# Patient Record
Sex: FEMALE | Race: WHITE | Employment: OTHER | URBAN - METROPOLITAN AREA
[De-identification: names, ages, dates, MRNs, and addresses within clinical notes are randomized per-mention and may not be internally consistent; named-entity substitution may affect disease eponyms.]

---

## 2017-01-16 RX ORDER — FUROSEMIDE 20 MG/1
TABLET ORAL
Qty: 90 TABLET | Refills: 0 | Status: SHIPPED | OUTPATIENT
Start: 2017-01-16 | End: 2017-04-24 | Stop reason: SDUPTHER

## 2017-02-16 ENCOUNTER — OFFICE VISIT (OUTPATIENT)
Dept: DERMATOLOGY | Age: 67
End: 2017-02-16

## 2017-02-16 DIAGNOSIS — R21 RASH: Primary | ICD-10-CM

## 2017-02-16 PROCEDURE — 11100 PR BIOPSY OF SKIN LESION: CPT | Performed by: DERMATOLOGY

## 2017-02-16 PROCEDURE — G8399 PT W/DXA RESULTS DOCUMENT: HCPCS | Performed by: DERMATOLOGY

## 2017-02-16 PROCEDURE — 11101 PR BIOPSY, EACH ADDED LESION: CPT | Performed by: DERMATOLOGY

## 2017-02-16 PROCEDURE — 4040F PNEUMOC VAC/ADMIN/RCVD: CPT | Performed by: DERMATOLOGY

## 2017-02-16 PROCEDURE — 1123F ACP DISCUSS/DSCN MKR DOCD: CPT | Performed by: DERMATOLOGY

## 2017-02-16 PROCEDURE — 3014F SCREEN MAMMO DOC REV: CPT | Performed by: DERMATOLOGY

## 2017-02-16 PROCEDURE — 1090F PRES/ABSN URINE INCON ASSESS: CPT | Performed by: DERMATOLOGY

## 2017-02-16 PROCEDURE — G8427 DOCREV CUR MEDS BY ELIG CLIN: HCPCS | Performed by: DERMATOLOGY

## 2017-02-16 PROCEDURE — 99213 OFFICE O/P EST LOW 20 MIN: CPT | Performed by: DERMATOLOGY

## 2017-02-16 PROCEDURE — G8484 FLU IMMUNIZE NO ADMIN: HCPCS | Performed by: DERMATOLOGY

## 2017-02-16 PROCEDURE — 1036F TOBACCO NON-USER: CPT | Performed by: DERMATOLOGY

## 2017-02-16 PROCEDURE — G8417 CALC BMI ABV UP PARAM F/U: HCPCS | Performed by: DERMATOLOGY

## 2017-02-16 PROCEDURE — 3017F COLORECTAL CA SCREEN DOC REV: CPT | Performed by: DERMATOLOGY

## 2017-02-21 ENCOUNTER — TELEPHONE (OUTPATIENT)
Dept: DERMATOLOGY | Age: 67
End: 2017-02-21

## 2017-03-09 ENCOUNTER — OFFICE VISIT (OUTPATIENT)
Dept: DERMATOLOGY | Age: 67
End: 2017-03-09

## 2017-03-09 DIAGNOSIS — L43.9 LICHEN PLANUS: Primary | ICD-10-CM

## 2017-03-09 PROCEDURE — G8484 FLU IMMUNIZE NO ADMIN: HCPCS | Performed by: DERMATOLOGY

## 2017-03-09 PROCEDURE — 96372 THER/PROPH/DIAG INJ SC/IM: CPT | Performed by: DERMATOLOGY

## 2017-03-09 PROCEDURE — G8399 PT W/DXA RESULTS DOCUMENT: HCPCS | Performed by: DERMATOLOGY

## 2017-03-09 PROCEDURE — G8417 CALC BMI ABV UP PARAM F/U: HCPCS | Performed by: DERMATOLOGY

## 2017-03-09 PROCEDURE — 1036F TOBACCO NON-USER: CPT | Performed by: DERMATOLOGY

## 2017-03-09 PROCEDURE — 3014F SCREEN MAMMO DOC REV: CPT | Performed by: DERMATOLOGY

## 2017-03-09 PROCEDURE — 99213 OFFICE O/P EST LOW 20 MIN: CPT | Performed by: DERMATOLOGY

## 2017-03-09 PROCEDURE — G8427 DOCREV CUR MEDS BY ELIG CLIN: HCPCS | Performed by: DERMATOLOGY

## 2017-03-09 PROCEDURE — 4040F PNEUMOC VAC/ADMIN/RCVD: CPT | Performed by: DERMATOLOGY

## 2017-03-09 PROCEDURE — 3017F COLORECTAL CA SCREEN DOC REV: CPT | Performed by: DERMATOLOGY

## 2017-03-09 PROCEDURE — 1090F PRES/ABSN URINE INCON ASSESS: CPT | Performed by: DERMATOLOGY

## 2017-03-09 PROCEDURE — 1123F ACP DISCUSS/DSCN MKR DOCD: CPT | Performed by: DERMATOLOGY

## 2017-03-09 RX ORDER — TRIAMCINOLONE ACETONIDE 40 MG/ML
70 INJECTION, SUSPENSION INTRA-ARTICULAR; INTRAMUSCULAR ONCE
Status: COMPLETED | OUTPATIENT
Start: 2017-03-09 | End: 2017-03-09

## 2017-03-09 RX ADMIN — TRIAMCINOLONE ACETONIDE 70 MG: 40 INJECTION, SUSPENSION INTRA-ARTICULAR; INTRAMUSCULAR at 09:17

## 2017-04-06 ENCOUNTER — OFFICE VISIT (OUTPATIENT)
Dept: DERMATOLOGY | Age: 67
End: 2017-04-06

## 2017-04-06 DIAGNOSIS — L43.9 LICHEN PLANUS: Primary | ICD-10-CM

## 2017-04-06 PROCEDURE — 99213 OFFICE O/P EST LOW 20 MIN: CPT | Performed by: DERMATOLOGY

## 2017-04-06 PROCEDURE — 3014F SCREEN MAMMO DOC REV: CPT | Performed by: DERMATOLOGY

## 2017-04-06 PROCEDURE — G8417 CALC BMI ABV UP PARAM F/U: HCPCS | Performed by: DERMATOLOGY

## 2017-04-06 PROCEDURE — G8427 DOCREV CUR MEDS BY ELIG CLIN: HCPCS | Performed by: DERMATOLOGY

## 2017-04-06 PROCEDURE — G8399 PT W/DXA RESULTS DOCUMENT: HCPCS | Performed by: DERMATOLOGY

## 2017-04-06 PROCEDURE — 4040F PNEUMOC VAC/ADMIN/RCVD: CPT | Performed by: DERMATOLOGY

## 2017-04-06 PROCEDURE — 1123F ACP DISCUSS/DSCN MKR DOCD: CPT | Performed by: DERMATOLOGY

## 2017-04-06 PROCEDURE — 3017F COLORECTAL CA SCREEN DOC REV: CPT | Performed by: DERMATOLOGY

## 2017-04-06 PROCEDURE — 1036F TOBACCO NON-USER: CPT | Performed by: DERMATOLOGY

## 2017-04-06 PROCEDURE — 96372 THER/PROPH/DIAG INJ SC/IM: CPT | Performed by: DERMATOLOGY

## 2017-04-06 PROCEDURE — 1090F PRES/ABSN URINE INCON ASSESS: CPT | Performed by: DERMATOLOGY

## 2017-04-06 RX ORDER — TRIAMCINOLONE ACETONIDE 40 MG/ML
70 INJECTION, SUSPENSION INTRA-ARTICULAR; INTRAMUSCULAR ONCE
Status: COMPLETED | OUTPATIENT
Start: 2017-04-06 | End: 2017-04-06

## 2017-04-06 RX ADMIN — TRIAMCINOLONE ACETONIDE 70 MG: 40 INJECTION, SUSPENSION INTRA-ARTICULAR; INTRAMUSCULAR at 08:34

## 2017-04-24 RX ORDER — FUROSEMIDE 20 MG/1
TABLET ORAL
Qty: 90 TABLET | Refills: 0 | Status: SHIPPED | OUTPATIENT
Start: 2017-04-24 | End: 2017-07-20 | Stop reason: SDUPTHER

## 2017-05-04 ENCOUNTER — OFFICE VISIT (OUTPATIENT)
Dept: DERMATOLOGY | Age: 67
End: 2017-05-04

## 2017-05-04 DIAGNOSIS — L81.0 POST-INFLAMMATORY HYPERPIGMENTATION: ICD-10-CM

## 2017-05-04 DIAGNOSIS — L43.9 LICHEN PLANUS: Primary | ICD-10-CM

## 2017-05-04 PROCEDURE — 1090F PRES/ABSN URINE INCON ASSESS: CPT | Performed by: DERMATOLOGY

## 2017-05-04 PROCEDURE — 3017F COLORECTAL CA SCREEN DOC REV: CPT | Performed by: DERMATOLOGY

## 2017-05-04 PROCEDURE — 99213 OFFICE O/P EST LOW 20 MIN: CPT | Performed by: DERMATOLOGY

## 2017-05-04 PROCEDURE — 4040F PNEUMOC VAC/ADMIN/RCVD: CPT | Performed by: DERMATOLOGY

## 2017-05-04 PROCEDURE — 3014F SCREEN MAMMO DOC REV: CPT | Performed by: DERMATOLOGY

## 2017-05-04 PROCEDURE — 1036F TOBACCO NON-USER: CPT | Performed by: DERMATOLOGY

## 2017-05-04 PROCEDURE — G8399 PT W/DXA RESULTS DOCUMENT: HCPCS | Performed by: DERMATOLOGY

## 2017-05-04 PROCEDURE — 1123F ACP DISCUSS/DSCN MKR DOCD: CPT | Performed by: DERMATOLOGY

## 2017-05-04 PROCEDURE — G8427 DOCREV CUR MEDS BY ELIG CLIN: HCPCS | Performed by: DERMATOLOGY

## 2017-05-04 PROCEDURE — G8417 CALC BMI ABV UP PARAM F/U: HCPCS | Performed by: DERMATOLOGY

## 2017-07-12 ENCOUNTER — OFFICE VISIT (OUTPATIENT)
Dept: DERMATOLOGY | Age: 67
End: 2017-07-12

## 2017-07-12 DIAGNOSIS — L43.9 LICHEN PLANUS: Primary | ICD-10-CM

## 2017-07-12 PROCEDURE — 4040F PNEUMOC VAC/ADMIN/RCVD: CPT | Performed by: DERMATOLOGY

## 2017-07-12 PROCEDURE — 1123F ACP DISCUSS/DSCN MKR DOCD: CPT | Performed by: DERMATOLOGY

## 2017-07-12 PROCEDURE — G8421 BMI NOT CALCULATED: HCPCS | Performed by: DERMATOLOGY

## 2017-07-12 PROCEDURE — 3017F COLORECTAL CA SCREEN DOC REV: CPT | Performed by: DERMATOLOGY

## 2017-07-12 PROCEDURE — G8399 PT W/DXA RESULTS DOCUMENT: HCPCS | Performed by: DERMATOLOGY

## 2017-07-12 PROCEDURE — 1090F PRES/ABSN URINE INCON ASSESS: CPT | Performed by: DERMATOLOGY

## 2017-07-12 PROCEDURE — 1036F TOBACCO NON-USER: CPT | Performed by: DERMATOLOGY

## 2017-07-12 PROCEDURE — G8427 DOCREV CUR MEDS BY ELIG CLIN: HCPCS | Performed by: DERMATOLOGY

## 2017-07-12 PROCEDURE — 3014F SCREEN MAMMO DOC REV: CPT | Performed by: DERMATOLOGY

## 2017-07-12 PROCEDURE — 99213 OFFICE O/P EST LOW 20 MIN: CPT | Performed by: DERMATOLOGY

## 2017-07-12 RX ORDER — BETAMETHASONE DIPROPIONATE 0.05 %
OINTMENT (GRAM) TOPICAL
Qty: 45 G | Refills: 2 | Status: SHIPPED | OUTPATIENT
Start: 2017-07-12 | End: 2018-06-26

## 2017-07-20 RX ORDER — FUROSEMIDE 20 MG/1
TABLET ORAL
Qty: 90 TABLET | Refills: 0 | Status: SHIPPED | OUTPATIENT
Start: 2017-07-20 | End: 2017-10-31 | Stop reason: SDUPTHER

## 2017-07-25 ENCOUNTER — TELEPHONE (OUTPATIENT)
Dept: CASE MANAGEMENT | Age: 67
End: 2017-07-25

## 2017-07-26 ENCOUNTER — OFFICE VISIT (OUTPATIENT)
Dept: FAMILY MEDICINE CLINIC | Age: 67
End: 2017-07-26

## 2017-07-26 VITALS
DIASTOLIC BLOOD PRESSURE: 80 MMHG | SYSTOLIC BLOOD PRESSURE: 140 MMHG | BODY MASS INDEX: 28.63 KG/M2 | RESPIRATION RATE: 20 BRPM | WEIGHT: 142 LBS | HEIGHT: 59 IN | TEMPERATURE: 98 F | HEART RATE: 84 BPM

## 2017-07-26 DIAGNOSIS — Z13.220 LIPID SCREENING: ICD-10-CM

## 2017-07-26 DIAGNOSIS — I10 ESSENTIAL HYPERTENSION: Primary | ICD-10-CM

## 2017-07-26 DIAGNOSIS — I10 ESSENTIAL HYPERTENSION: ICD-10-CM

## 2017-07-26 DIAGNOSIS — R73.03 PREDIABETES: ICD-10-CM

## 2017-07-26 DIAGNOSIS — L43.9 LICHEN PLANUS: ICD-10-CM

## 2017-07-26 LAB
A/G RATIO: 1.3 (ref 1.1–2.2)
ALBUMIN SERPL-MCNC: 4 G/DL (ref 3.4–5)
ALP BLD-CCNC: 98 U/L (ref 40–129)
ALT SERPL-CCNC: 22 U/L (ref 10–40)
ANION GAP SERPL CALCULATED.3IONS-SCNC: 14 MMOL/L (ref 3–16)
AST SERPL-CCNC: 18 U/L (ref 15–37)
BILIRUB SERPL-MCNC: <0.2 MG/DL (ref 0–1)
BUN BLDV-MCNC: 15 MG/DL (ref 7–20)
CALCIUM SERPL-MCNC: 9.1 MG/DL (ref 8.3–10.6)
CHLORIDE BLD-SCNC: 101 MMOL/L (ref 99–110)
CHOLESTEROL, TOTAL: 166 MG/DL (ref 0–199)
CO2: 27 MMOL/L (ref 21–32)
CREAT SERPL-MCNC: 0.8 MG/DL (ref 0.6–1.2)
ESTIMATED AVERAGE GLUCOSE: 105.4 MG/DL
GFR AFRICAN AMERICAN: >60
GFR NON-AFRICAN AMERICAN: >60
GLOBULIN: 3 G/DL
GLUCOSE BLD-MCNC: 91 MG/DL (ref 70–99)
HBA1C MFR BLD: 5.3 %
HDLC SERPL-MCNC: 85 MG/DL (ref 40–60)
LDL CHOLESTEROL CALCULATED: 66 MG/DL
POTASSIUM SERPL-SCNC: 4 MMOL/L (ref 3.5–5.1)
SODIUM BLD-SCNC: 142 MMOL/L (ref 136–145)
TOTAL PROTEIN: 7 G/DL (ref 6.4–8.2)
TRIGL SERPL-MCNC: 73 MG/DL (ref 0–150)
VLDLC SERPL CALC-MCNC: 15 MG/DL

## 2017-07-26 PROCEDURE — 4040F PNEUMOC VAC/ADMIN/RCVD: CPT | Performed by: INTERNAL MEDICINE

## 2017-07-26 PROCEDURE — 1036F TOBACCO NON-USER: CPT | Performed by: INTERNAL MEDICINE

## 2017-07-26 PROCEDURE — 3014F SCREEN MAMMO DOC REV: CPT | Performed by: INTERNAL MEDICINE

## 2017-07-26 PROCEDURE — G8399 PT W/DXA RESULTS DOCUMENT: HCPCS | Performed by: INTERNAL MEDICINE

## 2017-07-26 PROCEDURE — 1123F ACP DISCUSS/DSCN MKR DOCD: CPT | Performed by: INTERNAL MEDICINE

## 2017-07-26 PROCEDURE — G8427 DOCREV CUR MEDS BY ELIG CLIN: HCPCS | Performed by: INTERNAL MEDICINE

## 2017-07-26 PROCEDURE — 3017F COLORECTAL CA SCREEN DOC REV: CPT | Performed by: INTERNAL MEDICINE

## 2017-07-26 PROCEDURE — G8417 CALC BMI ABV UP PARAM F/U: HCPCS | Performed by: INTERNAL MEDICINE

## 2017-07-26 PROCEDURE — 1090F PRES/ABSN URINE INCON ASSESS: CPT | Performed by: INTERNAL MEDICINE

## 2017-07-26 PROCEDURE — 99214 OFFICE O/P EST MOD 30 MIN: CPT | Performed by: INTERNAL MEDICINE

## 2017-07-26 RX ORDER — AMLODIPINE BESYLATE 5 MG/1
5 TABLET ORAL DAILY
Qty: 30 TABLET | Refills: 3 | Status: SHIPPED | OUTPATIENT
Start: 2017-07-26 | End: 2018-05-29 | Stop reason: SDUPTHER

## 2017-07-26 ASSESSMENT — ENCOUNTER SYMPTOMS
CONSTIPATION: 0
WHEEZING: 0
SHORTNESS OF BREATH: 0
DIARRHEA: 0

## 2017-08-31 ENCOUNTER — TELEPHONE (OUTPATIENT)
Dept: FAMILY MEDICINE CLINIC | Age: 67
End: 2017-08-31

## 2017-09-12 ENCOUNTER — OFFICE VISIT (OUTPATIENT)
Dept: DERMATOLOGY | Age: 67
End: 2017-09-12

## 2017-09-12 DIAGNOSIS — R21 RASH: Primary | ICD-10-CM

## 2017-09-12 PROCEDURE — 1090F PRES/ABSN URINE INCON ASSESS: CPT | Performed by: DERMATOLOGY

## 2017-09-12 PROCEDURE — 3017F COLORECTAL CA SCREEN DOC REV: CPT | Performed by: DERMATOLOGY

## 2017-09-12 PROCEDURE — 3014F SCREEN MAMMO DOC REV: CPT | Performed by: DERMATOLOGY

## 2017-09-12 PROCEDURE — G8399 PT W/DXA RESULTS DOCUMENT: HCPCS | Performed by: DERMATOLOGY

## 2017-09-12 PROCEDURE — 4040F PNEUMOC VAC/ADMIN/RCVD: CPT | Performed by: DERMATOLOGY

## 2017-09-12 PROCEDURE — 11100 PR BIOPSY OF SKIN LESION: CPT | Performed by: DERMATOLOGY

## 2017-09-12 PROCEDURE — G8417 CALC BMI ABV UP PARAM F/U: HCPCS | Performed by: DERMATOLOGY

## 2017-09-12 PROCEDURE — 99212 OFFICE O/P EST SF 10 MIN: CPT | Performed by: DERMATOLOGY

## 2017-09-12 PROCEDURE — 1123F ACP DISCUSS/DSCN MKR DOCD: CPT | Performed by: DERMATOLOGY

## 2017-09-12 PROCEDURE — 1036F TOBACCO NON-USER: CPT | Performed by: DERMATOLOGY

## 2017-09-12 PROCEDURE — G8427 DOCREV CUR MEDS BY ELIG CLIN: HCPCS | Performed by: DERMATOLOGY

## 2017-09-15 ENCOUNTER — TELEPHONE (OUTPATIENT)
Dept: DERMATOLOGY | Age: 67
End: 2017-09-15

## 2017-09-26 ENCOUNTER — OFFICE VISIT (OUTPATIENT)
Dept: FAMILY MEDICINE CLINIC | Age: 67
End: 2017-09-26

## 2017-09-26 VITALS
HEART RATE: 66 BPM | DIASTOLIC BLOOD PRESSURE: 98 MMHG | BODY MASS INDEX: 29.43 KG/M2 | WEIGHT: 146 LBS | TEMPERATURE: 98 F | SYSTOLIC BLOOD PRESSURE: 190 MMHG | OXYGEN SATURATION: 98 % | HEIGHT: 59 IN | RESPIRATION RATE: 18 BRPM

## 2017-09-26 DIAGNOSIS — I10 ESSENTIAL HYPERTENSION: ICD-10-CM

## 2017-09-26 DIAGNOSIS — Z12.31 ENCOUNTER FOR SCREENING MAMMOGRAM FOR BREAST CANCER: ICD-10-CM

## 2017-09-26 DIAGNOSIS — J43.2 CENTRILOBULAR EMPHYSEMA (HCC): ICD-10-CM

## 2017-09-26 DIAGNOSIS — Z11.59 NEED FOR HEPATITIS C SCREENING TEST: ICD-10-CM

## 2017-09-26 DIAGNOSIS — R10.12 LEFT UPPER QUADRANT PAIN: ICD-10-CM

## 2017-09-26 DIAGNOSIS — R10.12 LEFT UPPER QUADRANT PAIN: Primary | ICD-10-CM

## 2017-09-26 DIAGNOSIS — R82.998 LEUKOCYTES IN URINE: ICD-10-CM

## 2017-09-26 LAB
A/G RATIO: 1.4 (ref 1.1–2.2)
ALBUMIN SERPL-MCNC: 4 G/DL (ref 3.4–5)
ALP BLD-CCNC: 100 U/L (ref 40–129)
ALT SERPL-CCNC: 12 U/L (ref 10–40)
ANION GAP SERPL CALCULATED.3IONS-SCNC: 14 MMOL/L (ref 3–16)
AST SERPL-CCNC: 12 U/L (ref 15–37)
BASOPHILS ABSOLUTE: 0 K/UL (ref 0–0.2)
BASOPHILS RELATIVE PERCENT: 0.5 %
BILIRUB SERPL-MCNC: 0.4 MG/DL (ref 0–1)
BILIRUBIN, POC: ABNORMAL
BLOOD URINE, POC: ABNORMAL
BUN BLDV-MCNC: 17 MG/DL (ref 7–20)
CALCIUM SERPL-MCNC: 9.1 MG/DL (ref 8.3–10.6)
CHLORIDE BLD-SCNC: 103 MMOL/L (ref 99–110)
CLARITY, POC: CLEAR
CO2: 25 MMOL/L (ref 21–32)
COLOR, POC: YELLOW
CREAT SERPL-MCNC: 0.8 MG/DL (ref 0.6–1.2)
EOSINOPHILS ABSOLUTE: 0.1 K/UL (ref 0–0.6)
EOSINOPHILS RELATIVE PERCENT: 1.1 %
GFR AFRICAN AMERICAN: >60
GFR NON-AFRICAN AMERICAN: >60
GLOBULIN: 2.8 G/DL
GLUCOSE BLD-MCNC: 81 MG/DL (ref 70–99)
GLUCOSE URINE, POC: ABNORMAL
HCT VFR BLD CALC: 40.4 % (ref 36–48)
HEMOGLOBIN: 13.2 G/DL (ref 12–16)
HEPATITIS C ANTIBODY INTERPRETATION: NORMAL
KETONES, POC: ABNORMAL
LEUKOCYTE EST, POC: ABNORMAL
LYMPHOCYTES ABSOLUTE: 1.6 K/UL (ref 1–5.1)
LYMPHOCYTES RELATIVE PERCENT: 19.8 %
MCH RBC QN AUTO: 28.5 PG (ref 26–34)
MCHC RBC AUTO-ENTMCNC: 32.6 G/DL (ref 31–36)
MCV RBC AUTO: 87.4 FL (ref 80–100)
MONOCYTES ABSOLUTE: 0.5 K/UL (ref 0–1.3)
MONOCYTES RELATIVE PERCENT: 6 %
NEUTROPHILS ABSOLUTE: 6 K/UL (ref 1.7–7.7)
NEUTROPHILS RELATIVE PERCENT: 72.6 %
NITRITE, POC: ABNORMAL
PDW BLD-RTO: 13.9 % (ref 12.4–15.4)
PH, POC: 7
PLATELET # BLD: 186 K/UL (ref 135–450)
PMV BLD AUTO: 10.3 FL (ref 5–10.5)
POTASSIUM SERPL-SCNC: 4.4 MMOL/L (ref 3.5–5.1)
PROTEIN, POC: ABNORMAL
RBC # BLD: 4.63 M/UL (ref 4–5.2)
SEDIMENTATION RATE, ERYTHROCYTE: 30 MM/HR (ref 0–30)
SODIUM BLD-SCNC: 142 MMOL/L (ref 136–145)
SPECIFIC GRAVITY, POC: 1.02
TOTAL PROTEIN: 6.8 G/DL (ref 6.4–8.2)
UROBILINOGEN, POC: 0.2
WBC # BLD: 8.3 K/UL (ref 4–11)

## 2017-09-26 PROCEDURE — 3017F COLORECTAL CA SCREEN DOC REV: CPT | Performed by: INTERNAL MEDICINE

## 2017-09-26 PROCEDURE — 90662 IIV NO PRSV INCREASED AG IM: CPT | Performed by: INTERNAL MEDICINE

## 2017-09-26 PROCEDURE — 1036F TOBACCO NON-USER: CPT | Performed by: INTERNAL MEDICINE

## 2017-09-26 PROCEDURE — 99214 OFFICE O/P EST MOD 30 MIN: CPT | Performed by: INTERNAL MEDICINE

## 2017-09-26 PROCEDURE — G8427 DOCREV CUR MEDS BY ELIG CLIN: HCPCS | Performed by: INTERNAL MEDICINE

## 2017-09-26 PROCEDURE — 1123F ACP DISCUSS/DSCN MKR DOCD: CPT | Performed by: INTERNAL MEDICINE

## 2017-09-26 PROCEDURE — G0008 ADMIN INFLUENZA VIRUS VAC: HCPCS | Performed by: INTERNAL MEDICINE

## 2017-09-26 PROCEDURE — 4040F PNEUMOC VAC/ADMIN/RCVD: CPT | Performed by: INTERNAL MEDICINE

## 2017-09-26 PROCEDURE — 1090F PRES/ABSN URINE INCON ASSESS: CPT | Performed by: INTERNAL MEDICINE

## 2017-09-26 PROCEDURE — G8399 PT W/DXA RESULTS DOCUMENT: HCPCS | Performed by: INTERNAL MEDICINE

## 2017-09-26 PROCEDURE — 3023F SPIROM DOC REV: CPT | Performed by: INTERNAL MEDICINE

## 2017-09-26 PROCEDURE — G8926 SPIRO NO PERF OR DOC: HCPCS | Performed by: INTERNAL MEDICINE

## 2017-09-26 PROCEDURE — 81002 URINALYSIS NONAUTO W/O SCOPE: CPT | Performed by: INTERNAL MEDICINE

## 2017-09-26 PROCEDURE — G8417 CALC BMI ABV UP PARAM F/U: HCPCS | Performed by: INTERNAL MEDICINE

## 2017-09-26 PROCEDURE — 3014F SCREEN MAMMO DOC REV: CPT | Performed by: INTERNAL MEDICINE

## 2017-09-26 ASSESSMENT — ENCOUNTER SYMPTOMS
BLOOD IN STOOL: 0
SHORTNESS OF BREATH: 0
DIARRHEA: 0
WHEEZING: 0
CONSTIPATION: 0
NAUSEA: 0

## 2017-09-27 LAB
ORGANISM: ABNORMAL
URINE CULTURE, ROUTINE: ABNORMAL
URINE CULTURE, ROUTINE: ABNORMAL

## 2017-09-27 RX ORDER — AMOXICILLIN 500 MG/1
500 CAPSULE ORAL 3 TIMES DAILY
Qty: 15 CAPSULE | Refills: 0 | Status: SHIPPED | OUTPATIENT
Start: 2017-09-27 | End: 2017-10-02

## 2017-10-10 ENCOUNTER — OFFICE VISIT (OUTPATIENT)
Dept: FAMILY MEDICINE CLINIC | Age: 67
End: 2017-10-10

## 2017-10-10 VITALS
RESPIRATION RATE: 18 BRPM | HEART RATE: 74 BPM | DIASTOLIC BLOOD PRESSURE: 90 MMHG | WEIGHT: 146 LBS | TEMPERATURE: 97.9 F | HEIGHT: 59 IN | OXYGEN SATURATION: 98 % | BODY MASS INDEX: 29.43 KG/M2 | SYSTOLIC BLOOD PRESSURE: 158 MMHG

## 2017-10-10 DIAGNOSIS — R91.1 PULMONARY NODULE, LEFT: ICD-10-CM

## 2017-10-10 DIAGNOSIS — I10 ESSENTIAL HYPERTENSION: ICD-10-CM

## 2017-10-10 DIAGNOSIS — J43.2 CENTRILOBULAR EMPHYSEMA (HCC): Primary | ICD-10-CM

## 2017-10-10 PROCEDURE — 1090F PRES/ABSN URINE INCON ASSESS: CPT | Performed by: INTERNAL MEDICINE

## 2017-10-10 PROCEDURE — 1123F ACP DISCUSS/DSCN MKR DOCD: CPT | Performed by: INTERNAL MEDICINE

## 2017-10-10 PROCEDURE — 3017F COLORECTAL CA SCREEN DOC REV: CPT | Performed by: INTERNAL MEDICINE

## 2017-10-10 PROCEDURE — G8484 FLU IMMUNIZE NO ADMIN: HCPCS | Performed by: INTERNAL MEDICINE

## 2017-10-10 PROCEDURE — G8926 SPIRO NO PERF OR DOC: HCPCS | Performed by: INTERNAL MEDICINE

## 2017-10-10 PROCEDURE — G8399 PT W/DXA RESULTS DOCUMENT: HCPCS | Performed by: INTERNAL MEDICINE

## 2017-10-10 PROCEDURE — G8417 CALC BMI ABV UP PARAM F/U: HCPCS | Performed by: INTERNAL MEDICINE

## 2017-10-10 PROCEDURE — G8427 DOCREV CUR MEDS BY ELIG CLIN: HCPCS | Performed by: INTERNAL MEDICINE

## 2017-10-10 PROCEDURE — 4040F PNEUMOC VAC/ADMIN/RCVD: CPT | Performed by: INTERNAL MEDICINE

## 2017-10-10 PROCEDURE — 3014F SCREEN MAMMO DOC REV: CPT | Performed by: INTERNAL MEDICINE

## 2017-10-10 PROCEDURE — 1036F TOBACCO NON-USER: CPT | Performed by: INTERNAL MEDICINE

## 2017-10-10 PROCEDURE — 3023F SPIROM DOC REV: CPT | Performed by: INTERNAL MEDICINE

## 2017-10-10 PROCEDURE — 99214 OFFICE O/P EST MOD 30 MIN: CPT | Performed by: INTERNAL MEDICINE

## 2017-10-10 ASSESSMENT — ENCOUNTER SYMPTOMS
NAUSEA: 0
ABDOMINAL PAIN: 1
SHORTNESS OF BREATH: 0
VOMITING: 0
WHEEZING: 0

## 2017-10-10 NOTE — PROGRESS NOTES
Size: Medium Adult)   Pulse 74   Temp 97.9 °F (36.6 °C) (Oral)   Resp 18   Ht 4' 11\" (1.499 m)   Wt 146 lb (66.2 kg)   SpO2 98%   BMI 29.49 kg/m²     Physical Exam   Constitutional: She appears well-developed and well-nourished. HENT:   Head: Normocephalic and atraumatic. Eyes: Conjunctivae are normal.   Cardiovascular: Normal rate and regular rhythm. No murmur heard. Pulmonary/Chest: Effort normal and breath sounds normal. She has no wheezes. Abdominal: Soft. There is no tenderness. Skin: Skin is warm and dry. Psychiatric: She has a normal mood and affect. Her behavior is normal. Judgment and thought content normal.       Wt Readings from Last 3 Encounters:   10/10/17 146 lb (66.2 kg)   09/26/17 146 lb (66.2 kg)   07/26/17 142 lb (64.4 kg)       BP Readings from Last 3 Encounters:   10/10/17 (!) 158/90   09/26/17 (!) 190/98   07/26/17 (!) 140/80         Marlo Braden was seen today for wheezing. Diagnoses and all orders for this visit:    Centrilobular emphysema ([address]    Essential hypertension    Pulmonary nodule, left- on lung cancer screening program-  pet scan 6/16=unchanged billy nodule-little activity lung--pos activoity ge jct (still need to cont puln nod surv x 2 yrs)      No wheezing now  bp is running little high  Says it is better at home  Will ck cxr and then let pt know results. May need to just follow up with pulm with her complicated case  Await ct of the abd for her abd pain. Needs to keep cking bp at home and bring in maching.

## 2017-10-11 ENCOUNTER — HOSPITAL ENCOUNTER (OUTPATIENT)
Dept: CT IMAGING | Age: 67
Discharge: HOME | End: 2017-10-11
Attending: INTERNAL MEDICINE | Admitting: INTERNAL MEDICINE

## 2017-10-11 DIAGNOSIS — Z12.31 ENCOUNTER FOR SCREENING MAMMOGRAM FOR BREAST CANCER: ICD-10-CM

## 2017-10-11 DIAGNOSIS — R91.1 PULMONARY NODULE, LEFT: ICD-10-CM

## 2017-10-11 DIAGNOSIS — R10.12 LEFT UPPER QUADRANT PAIN: ICD-10-CM

## 2017-10-11 DIAGNOSIS — J43.2 CENTRILOBULAR EMPHYSEMA (HCC): ICD-10-CM

## 2017-10-31 RX ORDER — FUROSEMIDE 20 MG/1
TABLET ORAL
Qty: 90 TABLET | Refills: 0 | Status: SHIPPED | OUTPATIENT
Start: 2017-10-31 | End: 2018-01-29 | Stop reason: SDUPTHER

## 2017-11-08 ENCOUNTER — OFFICE VISIT (OUTPATIENT)
Dept: DERMATOLOGY | Age: 67
End: 2017-11-08

## 2017-11-08 DIAGNOSIS — R21 RASH: Primary | ICD-10-CM

## 2017-11-08 PROCEDURE — 11100 PR BIOPSY OF SKIN LESION: CPT | Performed by: DERMATOLOGY

## 2017-11-08 NOTE — PATIENT INSTRUCTIONS

## 2017-11-13 ENCOUNTER — TELEPHONE (OUTPATIENT)
Dept: DERMATOLOGY | Age: 67
End: 2017-11-13

## 2017-12-11 ENCOUNTER — OFFICE VISIT (OUTPATIENT)
Dept: FAMILY MEDICINE CLINIC | Age: 67
End: 2017-12-11

## 2017-12-11 VITALS
SYSTOLIC BLOOD PRESSURE: 160 MMHG | HEIGHT: 59 IN | RESPIRATION RATE: 16 BRPM | WEIGHT: 148 LBS | HEART RATE: 84 BPM | OXYGEN SATURATION: 98 % | DIASTOLIC BLOOD PRESSURE: 90 MMHG | BODY MASS INDEX: 29.84 KG/M2

## 2017-12-11 DIAGNOSIS — I10 ESSENTIAL HYPERTENSION: ICD-10-CM

## 2017-12-11 DIAGNOSIS — K22.9 ESOPHAGEAL ABNORMALITY: ICD-10-CM

## 2017-12-11 DIAGNOSIS — R91.1 PULMONARY NODULE, LEFT: Primary | ICD-10-CM

## 2017-12-11 DIAGNOSIS — L40.1 PUSTULAR PSORIASIS: ICD-10-CM

## 2017-12-11 DIAGNOSIS — J43.2 CENTRILOBULAR EMPHYSEMA (HCC): ICD-10-CM

## 2017-12-11 PROCEDURE — 4040F PNEUMOC VAC/ADMIN/RCVD: CPT | Performed by: INTERNAL MEDICINE

## 2017-12-11 PROCEDURE — 1090F PRES/ABSN URINE INCON ASSESS: CPT | Performed by: INTERNAL MEDICINE

## 2017-12-11 PROCEDURE — G8484 FLU IMMUNIZE NO ADMIN: HCPCS | Performed by: INTERNAL MEDICINE

## 2017-12-11 PROCEDURE — 3017F COLORECTAL CA SCREEN DOC REV: CPT | Performed by: INTERNAL MEDICINE

## 2017-12-11 PROCEDURE — G8399 PT W/DXA RESULTS DOCUMENT: HCPCS | Performed by: INTERNAL MEDICINE

## 2017-12-11 PROCEDURE — G8926 SPIRO NO PERF OR DOC: HCPCS | Performed by: INTERNAL MEDICINE

## 2017-12-11 PROCEDURE — 3014F SCREEN MAMMO DOC REV: CPT | Performed by: INTERNAL MEDICINE

## 2017-12-11 PROCEDURE — 1123F ACP DISCUSS/DSCN MKR DOCD: CPT | Performed by: INTERNAL MEDICINE

## 2017-12-11 PROCEDURE — 1036F TOBACCO NON-USER: CPT | Performed by: INTERNAL MEDICINE

## 2017-12-11 PROCEDURE — G8417 CALC BMI ABV UP PARAM F/U: HCPCS | Performed by: INTERNAL MEDICINE

## 2017-12-11 PROCEDURE — G8427 DOCREV CUR MEDS BY ELIG CLIN: HCPCS | Performed by: INTERNAL MEDICINE

## 2017-12-11 PROCEDURE — 99214 OFFICE O/P EST MOD 30 MIN: CPT | Performed by: INTERNAL MEDICINE

## 2017-12-11 PROCEDURE — 3023F SPIROM DOC REV: CPT | Performed by: INTERNAL MEDICINE

## 2017-12-11 ASSESSMENT — PATIENT HEALTH QUESTIONNAIRE - PHQ9
SUM OF ALL RESPONSES TO PHQ QUESTIONS 1-9: 0
2. FEELING DOWN, DEPRESSED OR HOPELESS: 0
SUM OF ALL RESPONSES TO PHQ9 QUESTIONS 1 & 2: 0
1. LITTLE INTEREST OR PLEASURE IN DOING THINGS: 0

## 2017-12-11 ASSESSMENT — ENCOUNTER SYMPTOMS
CONSTIPATION: 0
SHORTNESS OF BREATH: 0
WHEEZING: 0
DIARRHEA: 0

## 2017-12-11 NOTE — PROGRESS NOTES
12/11/2017    Chief Complaint   Patient presents with    COPD     check up     COPD  ,  Quit smoking   2015. Had pulm nodule worked up with PET scan 6/16  Has some wheezing at night. Wakes her  Up,  Not every night  About 3 times a month. HTN  At home bp   Can't remember, but thinks it is a lot better than hre. Has a bp cuff      Not having the abd pain. Watching what she eats. Ct scan reviewed. Was completely  Negative. Saw GI   7/16  Was supposed to have colonoscopy and maybe an egd  Saw dr Mykel Lanidn. HPI    Review of Systems   Constitutional: Positive for appetite change (not that hungry). Negative for unexpected weight change. Respiratory: Negative for shortness of breath and wheezing. Gastrointestinal: Negative for constipation and diarrhea. Psychiatric/Behavioral: Negative for dysphoric mood. The patient is not nervous/anxious.         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     Social History Main Topics    Smoking status: Former Smoker     Packs/day: 0.50     Years: 11.00     Types: Cigarettes     Quit date: 8/3/2015    Smokeless tobacco: Never Used      Comment: counseled on tobacco exposure avoidance    Alcohol use No      Comment: Socially    Drug use: No    Sexual activity: Not Asked     Other Topics Concern    None     Social History Narrative    None     Family History   Problem Relation Age of Onset    Diabetes Mother    Kenroy Cruz Date Value Ref Range Status   07/26/2017 66 <100 mg/dL Final     HDL   Date Value Ref Range Status   07/26/2017 85 (H) 40 - 60 mg/dL Final     Triglycerides   Date Value Ref Range Status   07/26/2017 73 0 - 150 mg/dL Final     Lab Results   Component Value Date    ALT 12 09/26/2017    AST 12 (L) 09/26/2017    ALKPHOS 100 09/26/2017    BILITOT 0.4 09/26/2017      Lab Results   Component Value Date    WBC 8.3 09/26/2017    HGB 13.2 09/26/2017    HCT 40.4 09/26/2017    MCV 87.4 09/26/2017     09/26/2017     No results found for: TSH  Lab Results   Component Value Date    LABA1C 5.3 07/26/2017     No results found for: PSA, PSADIA     PHYSICAL EXAM:  BP (!) 160/90 (Site: Left Arm, Position: Sitting, Cuff Size: Medium Adult)   Pulse 84   Resp 16   Ht 4' 11\" (1.499 m)   Wt 148 lb (67.1 kg)   SpO2 98%   BMI 29.89 kg/m²    Physical Exam   Constitutional: She appears well-developed and well-nourished. HENT:   Head: Normocephalic and atraumatic. Cardiovascular: Normal rate and regular rhythm. No murmur heard. Pulmonary/Chest: Effort normal and breath sounds normal. She has no wheezes. Abdominal: Soft. She exhibits no mass. There is no tenderness. Neurological: She is alert. Skin: Skin is warm and dry. bilat tibial areas with chronic appearing discoloration. Skin is shinny    Psychiatric: She has a normal mood and affect. Her behavior is normal. Judgment and thought content normal.     Wt Readings from Last 3 Encounters:   12/11/17 148 lb (67.1 kg)   10/10/17 146 lb (66.2 kg)   09/26/17 146 lb (66.2 kg)     BP Readings from Last 3 Encounters:   12/11/17 (!) 160/90   10/10/17 (!) 158/90   09/26/17 (!) 190/98       ASSESSMENT/PLAN:  Ursula Rivera was seen today for copd.     Diagnoses and all orders for this visit:    Pulmonary nodule, left- on lung cancer screening program-  pet scan 6/16=unchanged billy nodule-little activity lung--pos activoity ge jct (still need to cont puln nod surv x 2 yrs)  -

## 2017-12-18 ENCOUNTER — TELEPHONE (OUTPATIENT)
Dept: PULMONOLOGY | Age: 67
End: 2017-12-18

## 2017-12-19 NOTE — TELEPHONE ENCOUNTER
Patient called back, prefers morning appt around 10 or so but may be able to come in the afternoon.   Please call pt back with appt info

## 2017-12-19 NOTE — TELEPHONE ENCOUNTER
Unfortunately, I am not able to offer any morning appointments in the next few weeks. I can see her on 1/4 at 2 PM. If she prefers a morning appointment please give her the next available appointment.

## 2018-01-04 ENCOUNTER — OFFICE VISIT (OUTPATIENT)
Dept: PULMONOLOGY | Age: 68
End: 2018-01-04

## 2018-01-04 VITALS
HEIGHT: 59 IN | OXYGEN SATURATION: 93 % | SYSTOLIC BLOOD PRESSURE: 120 MMHG | WEIGHT: 145.6 LBS | BODY MASS INDEX: 29.35 KG/M2 | TEMPERATURE: 99.1 F | HEART RATE: 110 BPM | DIASTOLIC BLOOD PRESSURE: 72 MMHG

## 2018-01-04 DIAGNOSIS — R91.1 PULMONARY NODULE SEEN ON IMAGING STUDY: Primary | ICD-10-CM

## 2018-01-04 DIAGNOSIS — J44.1 COPD EXACERBATION (HCC): ICD-10-CM

## 2018-01-04 DIAGNOSIS — J44.9 COPD, SEVERE (HCC): ICD-10-CM

## 2018-01-04 PROCEDURE — G8399 PT W/DXA RESULTS DOCUMENT: HCPCS | Performed by: INTERNAL MEDICINE

## 2018-01-04 PROCEDURE — 94640 AIRWAY INHALATION TREATMENT: CPT | Performed by: INTERNAL MEDICINE

## 2018-01-04 PROCEDURE — G8484 FLU IMMUNIZE NO ADMIN: HCPCS | Performed by: INTERNAL MEDICINE

## 2018-01-04 PROCEDURE — G8417 CALC BMI ABV UP PARAM F/U: HCPCS | Performed by: INTERNAL MEDICINE

## 2018-01-04 PROCEDURE — 4040F PNEUMOC VAC/ADMIN/RCVD: CPT | Performed by: INTERNAL MEDICINE

## 2018-01-04 PROCEDURE — G8427 DOCREV CUR MEDS BY ELIG CLIN: HCPCS | Performed by: INTERNAL MEDICINE

## 2018-01-04 PROCEDURE — 3014F SCREEN MAMMO DOC REV: CPT | Performed by: INTERNAL MEDICINE

## 2018-01-04 PROCEDURE — 1090F PRES/ABSN URINE INCON ASSESS: CPT | Performed by: INTERNAL MEDICINE

## 2018-01-04 PROCEDURE — 3017F COLORECTAL CA SCREEN DOC REV: CPT | Performed by: INTERNAL MEDICINE

## 2018-01-04 PROCEDURE — 1036F TOBACCO NON-USER: CPT | Performed by: INTERNAL MEDICINE

## 2018-01-04 PROCEDURE — 1123F ACP DISCUSS/DSCN MKR DOCD: CPT | Performed by: INTERNAL MEDICINE

## 2018-01-04 PROCEDURE — 99215 OFFICE O/P EST HI 40 MIN: CPT | Performed by: INTERNAL MEDICINE

## 2018-01-04 PROCEDURE — 3023F SPIROM DOC REV: CPT | Performed by: INTERNAL MEDICINE

## 2018-01-04 PROCEDURE — G8926 SPIRO NO PERF OR DOC: HCPCS | Performed by: INTERNAL MEDICINE

## 2018-01-04 RX ORDER — PREDNISONE 20 MG/1
40 TABLET ORAL DAILY
Qty: 10 TABLET | Refills: 0 | Status: ON HOLD | OUTPATIENT
Start: 2018-01-04 | End: 2018-01-09

## 2018-01-04 RX ORDER — ALBUTEROL SULFATE 2.5 MG/3ML
2.5 SOLUTION RESPIRATORY (INHALATION) ONCE
Status: COMPLETED | OUTPATIENT
Start: 2018-01-04 | End: 2018-01-04

## 2018-01-04 RX ORDER — NEBULIZER ACCESSORIES
1 KIT MISCELLANEOUS DAILY
Qty: 1 KIT | Refills: 0 | Status: SHIPPED | OUTPATIENT
Start: 2018-01-04 | End: 2020-09-21

## 2018-01-04 RX ORDER — IPRATROPIUM BROMIDE AND ALBUTEROL SULFATE 2.5; .5 MG/3ML; MG/3ML
1 SOLUTION RESPIRATORY (INHALATION) EVERY 4 HOURS
Qty: 360 ML | Refills: 5 | Status: SHIPPED | OUTPATIENT
Start: 2018-01-04 | End: 2019-10-10 | Stop reason: SDUPTHER

## 2018-01-04 RX ORDER — ALBUTEROL SULFATE 2.5 MG/3ML
2.5 SOLUTION RESPIRATORY (INHALATION) ONCE
Status: DISCONTINUED | OUTPATIENT
Start: 2018-01-04 | End: 2018-12-31 | Stop reason: ALTCHOICE

## 2018-01-04 RX ADMIN — ALBUTEROL SULFATE 2.5 MG: 2.5 SOLUTION RESPIRATORY (INHALATION) at 16:03

## 2018-01-04 NOTE — PROGRESS NOTES
REASON FOR CONSULTATION/CC:   Chief Complaint   Patient presents with    Pulmonary Nodule        Consult at request of  Ok Gonzales MD     PCP: Ok Gonzales MD    HISTORY OF PRESENT ILLNESS: Daniel Mayo is a 69y.o. year old female with a history of tobacco abuse who presents :       shortness of breath   She is having cough and sinus congestion. Increased shortness of breath or dyspnea on exertion. Has not been on o2. Currently at 86%. shortness of breath started new years. Lot of fevers. She believes she had the flu. Did not receive treatment. No wheezing. Having symptoms of chest congestions    Copd. Using Advair. Bid. Has albuterol prn for wheezing rare usage. Pulmonary nodule. Quit tobacco in 2015. No coughing up blood or weight loss. PAST MEDICAL HISTORY:  Past Medical History:   Diagnosis Date    COPD (chronic obstructive pulmonary disease) [address]     Glaucoma     Hypertension        PAST SURGICAL HISTORY:  No past surgical history on file. FAMILY HISTORY:  family history includes Coronary Art Dis in her mother and sister; Diabetes in her mother and sister; High Blood Pressure in her sister; Other in her father. SOCIAL HISTORY:   reports that she quit smoking about 2 years ago. Her smoking use included Cigarettes. She started smoking about 52 years ago. She has a 5.50 pack-year smoking history. She has never used smokeless tobacco.      ALLERGIES:  Patient is allergic to sulfa antibiotics.     REVIEW OF SYSTEMS:  Constitutional: Negative for fever    HENT: Negative for sore throat  Eyes: Negative for redness   Respiratory: ++ for dyspnea, ++ cough  Cardiovascular: Negative for chest pain  Musculoskeletal: Negative for arthralgias   Skin: Negative for rash  Neurological: Negative for syncope  Hematological: Negative for adenopathy  Psychiatric/Behavorial: Negative for anxiety    Objective:   PHYSICAL EXAM:  Blood pressure 120/72, pulse 110, temperature 99.1 °F visit. Assessment:     ·  Pulmonary Nodule  · COPD    Plan:      1. Pulmonary nodule seen on imaging study  Pulmonary nodule seen in 2015 with PET scan in 2016 with low level activity. I will get CT chest now to follow up. These were not seen on recent chest X-ray so this may have resolved. - CT Chest WO Contrast    2. COPD, severe ([facility]  Continue Advair and repeat PFT now to further assess. I will get her albuterol nebulizer for home. We will discuss pulmonary rehab at the next visit. - FULL PFT STUDY WITH PRE AND POST  - albuterol (PROVENTIL) nebulizer solution 2.5 mg; Take 3 mLs by nebulization once    3. COPD exacerbation ([facility] [facility]  She was found to have hypoxemia at check in. She was given albuterol nebulization with improvement to saturations to 93%. Send in prednisone   - albuterol (PROVENTIL) nebulizer solution 2.5 mg; Take 3 mLs by nebulization once  - predniSONE (DELTASONE) 20 MG tablet; Take 2 tablets by mouth daily for 5 days  Dispense: 10 tablet;  Refill: 0

## 2018-01-04 NOTE — LETTER
[facility] [address] [facility]  Phone: [phone]  Fax: [phone]    January 5, 2018       Patient: Espinoza Patel   MR Number: [id number]   YOB: 1950   Date of Visit: 1/4/2018       Dear Dr. Rachelle Bailey MD :    Thank you for the request for consultation for Espinoza Patel to me for the evaluation of  Nodule . Below are the relevant portions of my assessment and plan of care. Assessment:     ·  Pulmonary Nodule  · COPD    Plan:      1. Pulmonary nodule seen on imaging study  Pulmonary nodule seen in 2015 with PET scan in 2016 with low level activity. I will get CT chest now to follow up. These were not seen on recent chest X-ray so this may have resolved. - CT Chest WO Contrast    2. COPD, severe ([address]  Continue Advair and repeat PFT now to further assess. I will get her albuterol nebulizer for home. We will discuss pulmonary rehab at the next visit. - FULL PFT STUDY WITH PRE AND POST  - albuterol (PROVENTIL) nebulizer solution 2.5 mg; Take 3 mLs by nebulization once    3. COPD exacerbation ([address]  She was found to have hypoxemia at check in. She was given albuterol nebulization with improvement to saturations to 93%. Send in prednisone   - albuterol (PROVENTIL) nebulizer solution 2.5 mg; Take 3 mLs by nebulization once  - predniSONE (DELTASONE) 20 MG tablet; Take 2 tablets by mouth daily for 5 days  Dispense: 10 tablet; Refill: 0        If you have questions, please do not hesitate to call me. I look forward to following Naty Crabtree along with you.     Sincerely,        Екатерина Gay MD    CC providers:  No Recipients

## 2018-01-06 PROBLEM — J96.90 RESPIRATORY FAILURE (HCC): Status: ACTIVE | Noted: 2018-01-06

## 2018-01-14 ENCOUNTER — TELEPHONE (OUTPATIENT)
Dept: FAMILY MEDICINE CLINIC | Age: 68
End: 2018-01-14

## 2018-01-15 ENCOUNTER — TELEPHONE (OUTPATIENT)
Dept: PULMONOLOGY | Age: 68
End: 2018-01-15

## 2018-01-17 ENCOUNTER — TELEPHONE (OUTPATIENT)
Dept: DERMATOLOGY | Age: 68
End: 2018-01-17

## 2018-01-25 ENCOUNTER — OFFICE VISIT (OUTPATIENT)
Dept: PULMONOLOGY | Age: 68
End: 2018-01-25

## 2018-01-25 VITALS
OXYGEN SATURATION: 96 % | TEMPERATURE: 97.8 F | WEIGHT: 144 LBS | HEART RATE: 96 BPM | SYSTOLIC BLOOD PRESSURE: 144 MMHG | DIASTOLIC BLOOD PRESSURE: 70 MMHG | BODY MASS INDEX: 29.03 KG/M2 | HEIGHT: 59 IN | RESPIRATION RATE: 16 BRPM

## 2018-01-25 DIAGNOSIS — R59.0 MEDIASTINAL ADENOPATHY: ICD-10-CM

## 2018-01-25 DIAGNOSIS — R49.0 HOARSENESS: ICD-10-CM

## 2018-01-25 DIAGNOSIS — J44.9 COPD, SEVERE (HCC): Primary | ICD-10-CM

## 2018-01-25 PROCEDURE — 1090F PRES/ABSN URINE INCON ASSESS: CPT | Performed by: INTERNAL MEDICINE

## 2018-01-25 PROCEDURE — 3014F SCREEN MAMMO DOC REV: CPT | Performed by: INTERNAL MEDICINE

## 2018-01-25 PROCEDURE — 99214 OFFICE O/P EST MOD 30 MIN: CPT | Performed by: INTERNAL MEDICINE

## 2018-01-25 PROCEDURE — 3023F SPIROM DOC REV: CPT | Performed by: INTERNAL MEDICINE

## 2018-01-25 PROCEDURE — G8926 SPIRO NO PERF OR DOC: HCPCS | Performed by: INTERNAL MEDICINE

## 2018-01-25 PROCEDURE — 1111F DSCHRG MED/CURRENT MED MERGE: CPT | Performed by: INTERNAL MEDICINE

## 2018-01-25 PROCEDURE — G8484 FLU IMMUNIZE NO ADMIN: HCPCS | Performed by: INTERNAL MEDICINE

## 2018-01-25 PROCEDURE — G8427 DOCREV CUR MEDS BY ELIG CLIN: HCPCS | Performed by: INTERNAL MEDICINE

## 2018-01-25 PROCEDURE — G8399 PT W/DXA RESULTS DOCUMENT: HCPCS | Performed by: INTERNAL MEDICINE

## 2018-01-25 PROCEDURE — 4040F PNEUMOC VAC/ADMIN/RCVD: CPT | Performed by: INTERNAL MEDICINE

## 2018-01-25 PROCEDURE — G8417 CALC BMI ABV UP PARAM F/U: HCPCS | Performed by: INTERNAL MEDICINE

## 2018-01-25 PROCEDURE — 1123F ACP DISCUSS/DSCN MKR DOCD: CPT | Performed by: INTERNAL MEDICINE

## 2018-01-25 PROCEDURE — 3017F COLORECTAL CA SCREEN DOC REV: CPT | Performed by: INTERNAL MEDICINE

## 2018-01-25 PROCEDURE — 1036F TOBACCO NON-USER: CPT | Performed by: INTERNAL MEDICINE

## 2018-01-25 NOTE — PROGRESS NOTES
Last Renal  Lab Results   Component Value Date     01/12/2018    K 3.6 01/12/2018    CL 98 01/12/2018    CO2 29 01/12/2018    CO2 30 01/11/2018    CO2 29 01/10/2018    BUN 21 01/12/2018    CREATININE 0.8 01/12/2018    GLUCOSE 157 01/12/2018    CALCIUM 8.8 01/12/2018       Last ABG  POC Blood Gas: No results found for: POCPH, POCPCO2, POCPO2, POCHCO3, NBEA, JQJB9DEN  No results for input(s): PH, PCO2, PO2, HCO3, BE, O2SAT in the last 72 hours. Radiology Review:  Pertinent images / reports were reviewed as a part of this visit. CT Chest w/ contrast: No results found for this or any previous visit. CT Chest w/o contrast: No results found for this or any previous visit. CTPA: No results found for this or any previous visit. CXR PA/LAT:   Results for orders placed during the hospital encounter of 10/11/17   XR CHEST STANDARD (2 VW)    Narrative EXAMINATION:  TWO VIEWS OF THE CHEST    10/11/2017 11:12 am    COMPARISON:  10/08/2015    HISTORY:  ORDERING PHYSICIAN PROVIDED HISTORY: Centrilobular emphysema ([address]  TECHNOLOGIST PROVIDED HISTORY:  Technologist Provided Reason for Exam: rt crackles  Acuity: Acute  Type of Encounter: Initial  Relevant Medical/Surgical History: pulmonary issues    FINDINGS:  The lungs are without acute focal process. There is no effusion or  pneumothorax. The cardiomediastinal silhouette is without acute process. The  osseous structures are without acute process. Impression No acute process. CXR portable: No results found for this or any previous visit. Assessment:     ·  Pulmonary Nodule  · COPD    Plan:        1. COPD, severe ([address]  Returned to baseline. Advair bid. 2. Mediastinal adenopathy  PPD was negative in the hospital.  Will get CT chest in march to ensure this as resolved. - CT Chest WO Contrast; Future    3. Hoarseness    If does not resolve, then will referred to ENT.   This is likely secondary to irritation from cough and pneumonia

## 2018-01-29 RX ORDER — FUROSEMIDE 20 MG/1
TABLET ORAL
Qty: 90 TABLET | Refills: 0 | Status: SHIPPED | OUTPATIENT
Start: 2018-01-29 | End: 2018-05-15

## 2018-03-12 ENCOUNTER — OFFICE VISIT (OUTPATIENT)
Dept: FAMILY MEDICINE CLINIC | Age: 68
End: 2018-03-12

## 2018-03-12 VITALS
SYSTOLIC BLOOD PRESSURE: 126 MMHG | TEMPERATURE: 97.9 F | RESPIRATION RATE: 18 BRPM | OXYGEN SATURATION: 98 % | DIASTOLIC BLOOD PRESSURE: 80 MMHG | BODY MASS INDEX: 29.27 KG/M2 | HEIGHT: 59 IN | HEART RATE: 66 BPM | WEIGHT: 145.2 LBS

## 2018-03-12 DIAGNOSIS — R73.9 HYPERGLYCEMIA: ICD-10-CM

## 2018-03-12 DIAGNOSIS — J10.1 INFLUENZA A: Primary | ICD-10-CM

## 2018-03-12 DIAGNOSIS — I10 ESSENTIAL HYPERTENSION: ICD-10-CM

## 2018-03-12 DIAGNOSIS — J18.9 MULTIFOCAL PNEUMONIA: ICD-10-CM

## 2018-03-12 DIAGNOSIS — J43.2 CENTRILOBULAR EMPHYSEMA (HCC): ICD-10-CM

## 2018-03-12 LAB
A/G RATIO: 2 (ref 1.1–2.2)
ALBUMIN SERPL-MCNC: 4.3 G/DL (ref 3.4–5)
ALP BLD-CCNC: 90 U/L (ref 40–129)
ALT SERPL-CCNC: 17 U/L (ref 10–40)
ANION GAP SERPL CALCULATED.3IONS-SCNC: 14 MMOL/L (ref 3–16)
AST SERPL-CCNC: 19 U/L (ref 15–37)
BASOPHILS ABSOLUTE: 0 K/UL (ref 0–0.2)
BASOPHILS RELATIVE PERCENT: 0.4 %
BILIRUB SERPL-MCNC: 0.4 MG/DL (ref 0–1)
BUN BLDV-MCNC: 16 MG/DL (ref 7–20)
CALCIUM SERPL-MCNC: 8.7 MG/DL (ref 8.3–10.6)
CHLORIDE BLD-SCNC: 106 MMOL/L (ref 99–110)
CO2: 26 MMOL/L (ref 21–32)
CREAT SERPL-MCNC: 0.8 MG/DL (ref 0.6–1.2)
EOSINOPHILS ABSOLUTE: 0.1 K/UL (ref 0–0.6)
EOSINOPHILS RELATIVE PERCENT: 1.9 %
GFR AFRICAN AMERICAN: >60
GFR NON-AFRICAN AMERICAN: >60
GLOBULIN: 2.1 G/DL
GLUCOSE BLD-MCNC: 90 MG/DL (ref 70–99)
HCT VFR BLD CALC: 38.5 % (ref 36–48)
HEMOGLOBIN: 12.8 G/DL (ref 12–16)
LYMPHOCYTES ABSOLUTE: 1.5 K/UL (ref 1–5.1)
LYMPHOCYTES RELATIVE PERCENT: 20.7 %
MCH RBC QN AUTO: 29.1 PG (ref 26–34)
MCHC RBC AUTO-ENTMCNC: 33.3 G/DL (ref 31–36)
MCV RBC AUTO: 87.5 FL (ref 80–100)
MONOCYTES ABSOLUTE: 0.5 K/UL (ref 0–1.3)
MONOCYTES RELATIVE PERCENT: 6.8 %
NEUTROPHILS ABSOLUTE: 5.2 K/UL (ref 1.7–7.7)
NEUTROPHILS RELATIVE PERCENT: 70.2 %
PDW BLD-RTO: 14.6 % (ref 12.4–15.4)
PLATELET # BLD: 186 K/UL (ref 135–450)
PMV BLD AUTO: 10.2 FL (ref 5–10.5)
POTASSIUM SERPL-SCNC: 4.7 MMOL/L (ref 3.5–5.1)
RBC # BLD: 4.39 M/UL (ref 4–5.2)
SODIUM BLD-SCNC: 146 MMOL/L (ref 136–145)
TOTAL PROTEIN: 6.4 G/DL (ref 6.4–8.2)
WBC # BLD: 7.3 K/UL (ref 4–11)

## 2018-03-12 PROCEDURE — 3014F SCREEN MAMMO DOC REV: CPT | Performed by: INTERNAL MEDICINE

## 2018-03-12 PROCEDURE — G8482 FLU IMMUNIZE ORDER/ADMIN: HCPCS | Performed by: INTERNAL MEDICINE

## 2018-03-12 PROCEDURE — G8427 DOCREV CUR MEDS BY ELIG CLIN: HCPCS | Performed by: INTERNAL MEDICINE

## 2018-03-12 PROCEDURE — 1036F TOBACCO NON-USER: CPT | Performed by: INTERNAL MEDICINE

## 2018-03-12 PROCEDURE — G8926 SPIRO NO PERF OR DOC: HCPCS | Performed by: INTERNAL MEDICINE

## 2018-03-12 PROCEDURE — 1123F ACP DISCUSS/DSCN MKR DOCD: CPT | Performed by: INTERNAL MEDICINE

## 2018-03-12 PROCEDURE — 3017F COLORECTAL CA SCREEN DOC REV: CPT | Performed by: INTERNAL MEDICINE

## 2018-03-12 PROCEDURE — 1090F PRES/ABSN URINE INCON ASSESS: CPT | Performed by: INTERNAL MEDICINE

## 2018-03-12 PROCEDURE — G8417 CALC BMI ABV UP PARAM F/U: HCPCS | Performed by: INTERNAL MEDICINE

## 2018-03-12 PROCEDURE — 99214 OFFICE O/P EST MOD 30 MIN: CPT | Performed by: INTERNAL MEDICINE

## 2018-03-12 PROCEDURE — 3023F SPIROM DOC REV: CPT | Performed by: INTERNAL MEDICINE

## 2018-03-12 PROCEDURE — G8399 PT W/DXA RESULTS DOCUMENT: HCPCS | Performed by: INTERNAL MEDICINE

## 2018-03-12 PROCEDURE — 4040F PNEUMOC VAC/ADMIN/RCVD: CPT | Performed by: INTERNAL MEDICINE

## 2018-03-12 ASSESSMENT — ENCOUNTER SYMPTOMS
COUGH: 1
CONSTIPATION: 0
DIARRHEA: 0
SHORTNESS OF BREATH: 0

## 2018-03-12 NOTE — PROGRESS NOTES
3/12/2018    This is a 69 y.o. female   Chief Complaint   Patient presents with    3 Month Follow-Up     Pt is here for a 3 month f/u for her HTN . Pt also states she was in the hospital on 01/06/2018 with the flu. .    Was in the hospital in [address]  With acute resp failure and hypoxia and influenza A. Had mild copd exac brief bipap. , sepsis          Saw dr Adelaide Llanes  She does not want to see him again in follow up. Supposed to have  Ct  Chest this month per pulmonary. No smoking  3 yr. Sometimes coughs up brown sputum    Per hospital dc   CHF with mild pulm edema         HTN  cking at home and it is fine. Sister and mom with dm  Pt had high glucose  Pt is not a known dm. HPI    Review of Systems   Constitutional: Negative for chills and fever. Respiratory: Positive for cough (still coughing , in the morning dark,  after a while not dark. ). Negative for shortness of breath. Gastrointestinal: Negative for constipation and diarrhea. Neurological: Negative for dizziness and headaches. Past Medical History:   Diagnosis Date    COPD (chronic obstructive pulmonary disease) (HCC)     Glaucoma     Hypertension        Prior to Visit Medications    Medication Sig Taking? Authorizing Provider   furosemide (LASIX) 20 MG tablet TAKE 1 TABLET EVERY DAY Yes Naveen Gonzalez MD   amLODIPine (NORVASC) 5 MG tablet Take 1 tablet by mouth daily Yes Naveen Gonzalez MD   betamethasone dipropionate (DIPROLENE) 0.05 % ointment Apply to affected areas of the legs twice daily.  Yes Juliocesar Doran MD   fluticasone-salmeterol (ADVAIR DISKUS) 250-50 MCG/DOSE AEPB INHALE ONE PUFF INTO THE LUNGS EVERY 12 HOURS Yes Randi Cristina MD   Respiratory Therapy Supplies (NEBULIZER/TUBING/MOUTHPIECE) KIT 1 kit by Does not apply route daily  Leonor Palacios MD   ipratropium-albuterol (DUONEB) 0.5-2.5 (3) MG/3ML SOLN nebulizer solution Inhale 3 mLs into the lungs every 4 hours  Leonor Palacios MD   albuterol

## 2018-03-13 LAB
ESTIMATED AVERAGE GLUCOSE: 96.8 MG/DL
HBA1C MFR BLD: 5 %

## 2018-04-02 ENCOUNTER — TELEPHONE (OUTPATIENT)
Dept: PULMONOLOGY | Age: 68
End: 2018-04-02

## 2018-04-10 ENCOUNTER — HOSPITAL ENCOUNTER (OUTPATIENT)
Dept: CT IMAGING | Age: 68
Discharge: HOME | End: 2018-04-10
Attending: INTERNAL MEDICINE | Admitting: INTERNAL MEDICINE

## 2018-04-10 DIAGNOSIS — R59.0 LOCALIZED ENLARGED LYMPH NODES: ICD-10-CM

## 2018-04-10 DIAGNOSIS — R59.0 MEDIASTINAL ADENOPATHY: ICD-10-CM

## 2018-04-11 ENCOUNTER — OFFICE VISIT (OUTPATIENT)
Dept: PULMONOLOGY | Age: 68
End: 2018-04-11

## 2018-04-11 VITALS
WEIGHT: 141 LBS | SYSTOLIC BLOOD PRESSURE: 138 MMHG | DIASTOLIC BLOOD PRESSURE: 82 MMHG | BODY MASS INDEX: 28.43 KG/M2 | OXYGEN SATURATION: 99 % | RESPIRATION RATE: 16 BRPM | HEART RATE: 74 BPM | TEMPERATURE: 98.5 F | HEIGHT: 59 IN

## 2018-04-11 DIAGNOSIS — R91.1 PULMONARY NODULE SEEN ON IMAGING STUDY: ICD-10-CM

## 2018-04-11 DIAGNOSIS — J44.9 COPD, SEVERE (HCC): Primary | ICD-10-CM

## 2018-04-11 PROCEDURE — 1036F TOBACCO NON-USER: CPT | Performed by: INTERNAL MEDICINE

## 2018-04-11 PROCEDURE — G8427 DOCREV CUR MEDS BY ELIG CLIN: HCPCS | Performed by: INTERNAL MEDICINE

## 2018-04-11 PROCEDURE — G8417 CALC BMI ABV UP PARAM F/U: HCPCS | Performed by: INTERNAL MEDICINE

## 2018-04-11 PROCEDURE — 3023F SPIROM DOC REV: CPT | Performed by: INTERNAL MEDICINE

## 2018-04-11 PROCEDURE — 99213 OFFICE O/P EST LOW 20 MIN: CPT | Performed by: INTERNAL MEDICINE

## 2018-04-11 PROCEDURE — 1090F PRES/ABSN URINE INCON ASSESS: CPT | Performed by: INTERNAL MEDICINE

## 2018-04-11 PROCEDURE — 3014F SCREEN MAMMO DOC REV: CPT | Performed by: INTERNAL MEDICINE

## 2018-04-11 PROCEDURE — 1123F ACP DISCUSS/DSCN MKR DOCD: CPT | Performed by: INTERNAL MEDICINE

## 2018-04-11 PROCEDURE — 3017F COLORECTAL CA SCREEN DOC REV: CPT | Performed by: INTERNAL MEDICINE

## 2018-04-11 PROCEDURE — G8399 PT W/DXA RESULTS DOCUMENT: HCPCS | Performed by: INTERNAL MEDICINE

## 2018-04-11 PROCEDURE — 4040F PNEUMOC VAC/ADMIN/RCVD: CPT | Performed by: INTERNAL MEDICINE

## 2018-04-11 PROCEDURE — G8926 SPIRO NO PERF OR DOC: HCPCS | Performed by: INTERNAL MEDICINE

## 2018-05-15 ENCOUNTER — OFFICE VISIT (OUTPATIENT)
Dept: FAMILY MEDICINE CLINIC | Age: 68
End: 2018-05-15

## 2018-05-15 VITALS
DIASTOLIC BLOOD PRESSURE: 86 MMHG | SYSTOLIC BLOOD PRESSURE: 138 MMHG | RESPIRATION RATE: 18 BRPM | OXYGEN SATURATION: 96 % | BODY MASS INDEX: 29.43 KG/M2 | HEIGHT: 59 IN | HEART RATE: 64 BPM | WEIGHT: 146 LBS

## 2018-05-15 DIAGNOSIS — R60.0 LEG EDEMA, RIGHT: Primary | ICD-10-CM

## 2018-05-15 DIAGNOSIS — I10 ESSENTIAL HYPERTENSION: ICD-10-CM

## 2018-05-15 DIAGNOSIS — J43.2 CENTRILOBULAR EMPHYSEMA (HCC): ICD-10-CM

## 2018-05-15 PROCEDURE — 1036F TOBACCO NON-USER: CPT | Performed by: INTERNAL MEDICINE

## 2018-05-15 PROCEDURE — G8399 PT W/DXA RESULTS DOCUMENT: HCPCS | Performed by: INTERNAL MEDICINE

## 2018-05-15 PROCEDURE — G8926 SPIRO NO PERF OR DOC: HCPCS | Performed by: INTERNAL MEDICINE

## 2018-05-15 PROCEDURE — 99214 OFFICE O/P EST MOD 30 MIN: CPT | Performed by: INTERNAL MEDICINE

## 2018-05-15 PROCEDURE — 1123F ACP DISCUSS/DSCN MKR DOCD: CPT | Performed by: INTERNAL MEDICINE

## 2018-05-15 PROCEDURE — 3017F COLORECTAL CA SCREEN DOC REV: CPT | Performed by: INTERNAL MEDICINE

## 2018-05-15 PROCEDURE — 1090F PRES/ABSN URINE INCON ASSESS: CPT | Performed by: INTERNAL MEDICINE

## 2018-05-15 PROCEDURE — 3023F SPIROM DOC REV: CPT | Performed by: INTERNAL MEDICINE

## 2018-05-15 PROCEDURE — G8427 DOCREV CUR MEDS BY ELIG CLIN: HCPCS | Performed by: INTERNAL MEDICINE

## 2018-05-15 PROCEDURE — 4040F PNEUMOC VAC/ADMIN/RCVD: CPT | Performed by: INTERNAL MEDICINE

## 2018-05-15 PROCEDURE — G8417 CALC BMI ABV UP PARAM F/U: HCPCS | Performed by: INTERNAL MEDICINE

## 2018-05-15 RX ORDER — HYDROCHLOROTHIAZIDE 12.5 MG/1
12.5 TABLET ORAL DAILY
Qty: 30 TABLET | Refills: 0 | Status: SHIPPED | OUTPATIENT
Start: 2018-05-15 | End: 2018-05-15

## 2018-05-15 RX ORDER — LISINOPRIL 10 MG/1
10 TABLET ORAL DAILY
Qty: 30 TABLET | Refills: 3 | Status: SHIPPED | OUTPATIENT
Start: 2018-05-15 | End: 2018-05-29

## 2018-05-15 RX ORDER — AMLODIPINE BESYLATE 5 MG/1
5 TABLET ORAL DAILY
Qty: 30 TABLET | Refills: 3 | Status: SHIPPED | OUTPATIENT
Start: 2018-05-15 | End: 2018-05-29 | Stop reason: SDUPTHER

## 2018-05-15 RX ORDER — FUROSEMIDE 20 MG/1
20 TABLET ORAL DAILY
Qty: 90 TABLET | COMMUNITY
Start: 2018-05-15 | End: 2018-06-20 | Stop reason: SDUPTHER

## 2018-05-15 ASSESSMENT — ENCOUNTER SYMPTOMS
CONSTIPATION: 0
SHORTNESS OF BREATH: 0
DIARRHEA: 0
WHEEZING: 0

## 2018-05-23 ENCOUNTER — HOSPITAL ENCOUNTER (OUTPATIENT)
Dept: VASCULAR LAB | Age: 68
Discharge: HOME | End: 2018-05-23
Attending: INTERNAL MEDICINE | Admitting: INTERNAL MEDICINE

## 2018-05-23 DIAGNOSIS — I10 ESSENTIAL HYPERTENSION: ICD-10-CM

## 2018-05-23 DIAGNOSIS — R60.0 LOCALIZED EDEMA: ICD-10-CM

## 2018-05-23 LAB
ALBUMIN SERPL-MCNC: 4.3 G/DL (ref 3.4–5)
ANION GAP SERPL CALCULATED.3IONS-SCNC: 14 MMOL/L (ref 3–16)
BUN BLDV-MCNC: 17 MG/DL (ref 7–20)
CALCIUM SERPL-MCNC: 9.5 MG/DL (ref 8.3–10.6)
CHLORIDE BLD-SCNC: 102 MMOL/L (ref 99–110)
CO2: 29 MMOL/L (ref 21–32)
CREAT SERPL-MCNC: 0.7 MG/DL (ref 0.6–1.2)
GFR AFRICAN AMERICAN: >60
GFR NON-AFRICAN AMERICAN: >60
GLUCOSE BLD-MCNC: 96 MG/DL (ref 70–99)
PHOSPHORUS: 3.9 MG/DL (ref 2.5–4.9)
POTASSIUM SERPL-SCNC: 5.1 MMOL/L (ref 3.5–5.1)
SODIUM BLD-SCNC: 145 MMOL/L (ref 136–145)

## 2018-05-25 RX ORDER — FUROSEMIDE 20 MG/1
TABLET ORAL
Qty: 90 TABLET | Refills: 0 | Status: SHIPPED | OUTPATIENT
Start: 2018-05-25 | End: 2018-05-29 | Stop reason: SDUPTHER

## 2018-05-29 ENCOUNTER — OFFICE VISIT (OUTPATIENT)
Dept: FAMILY MEDICINE CLINIC | Age: 68
End: 2018-05-29

## 2018-05-29 VITALS
BODY MASS INDEX: 29.84 KG/M2 | RESPIRATION RATE: 16 BRPM | SYSTOLIC BLOOD PRESSURE: 144 MMHG | OXYGEN SATURATION: 97 % | HEART RATE: 72 BPM | DIASTOLIC BLOOD PRESSURE: 88 MMHG | WEIGHT: 148 LBS | HEIGHT: 59 IN

## 2018-05-29 DIAGNOSIS — R59.9 ENLARGED LYMPH NODE: ICD-10-CM

## 2018-05-29 DIAGNOSIS — D72.829 LEUKOCYTOSIS, UNSPECIFIED TYPE: ICD-10-CM

## 2018-05-29 DIAGNOSIS — R59.9 ENLARGED LYMPH NODE: Primary | ICD-10-CM

## 2018-05-29 LAB
BASOPHILS ABSOLUTE: 0 K/UL (ref 0–0.2)
BASOPHILS RELATIVE PERCENT: 0.5 %
EOSINOPHILS ABSOLUTE: 0.2 K/UL (ref 0–0.6)
EOSINOPHILS RELATIVE PERCENT: 1.6 %
HCT VFR BLD CALC: 39.6 % (ref 36–48)
HEMOGLOBIN: 13.2 G/DL (ref 12–16)
LYMPHOCYTES ABSOLUTE: 2.2 K/UL (ref 1–5.1)
LYMPHOCYTES RELATIVE PERCENT: 23.7 %
MCH RBC QN AUTO: 27.5 PG (ref 26–34)
MCHC RBC AUTO-ENTMCNC: 33.3 G/DL (ref 31–36)
MCV RBC AUTO: 82.6 FL (ref 80–100)
MONOCYTES ABSOLUTE: 0.6 K/UL (ref 0–1.3)
MONOCYTES RELATIVE PERCENT: 6 %
NEUTROPHILS ABSOLUTE: 6.4 K/UL (ref 1.7–7.7)
NEUTROPHILS RELATIVE PERCENT: 68.2 %
PDW BLD-RTO: 13.5 % (ref 12.4–15.4)
PLATELET # BLD: 192 K/UL (ref 135–450)
PMV BLD AUTO: 10.2 FL (ref 5–10.5)
RBC # BLD: 4.8 M/UL (ref 4–5.2)
WBC # BLD: 9.4 K/UL (ref 4–11)

## 2018-05-29 PROCEDURE — 1036F TOBACCO NON-USER: CPT | Performed by: INTERNAL MEDICINE

## 2018-05-29 PROCEDURE — G8427 DOCREV CUR MEDS BY ELIG CLIN: HCPCS | Performed by: INTERNAL MEDICINE

## 2018-05-29 PROCEDURE — 99214 OFFICE O/P EST MOD 30 MIN: CPT | Performed by: INTERNAL MEDICINE

## 2018-05-29 PROCEDURE — G8417 CALC BMI ABV UP PARAM F/U: HCPCS | Performed by: INTERNAL MEDICINE

## 2018-05-29 PROCEDURE — G8399 PT W/DXA RESULTS DOCUMENT: HCPCS | Performed by: INTERNAL MEDICINE

## 2018-05-29 PROCEDURE — 1123F ACP DISCUSS/DSCN MKR DOCD: CPT | Performed by: INTERNAL MEDICINE

## 2018-05-29 PROCEDURE — 1090F PRES/ABSN URINE INCON ASSESS: CPT | Performed by: INTERNAL MEDICINE

## 2018-05-29 PROCEDURE — 3017F COLORECTAL CA SCREEN DOC REV: CPT | Performed by: INTERNAL MEDICINE

## 2018-05-29 PROCEDURE — 4040F PNEUMOC VAC/ADMIN/RCVD: CPT | Performed by: INTERNAL MEDICINE

## 2018-05-29 RX ORDER — AMLODIPINE BESYLATE 5 MG/1
5 TABLET ORAL DAILY
Qty: 30 TABLET | Refills: 1 | Status: SHIPPED | OUTPATIENT
Start: 2018-05-29 | End: 2018-06-20

## 2018-05-29 ASSESSMENT — ENCOUNTER SYMPTOMS
VOMITING: 0
SHORTNESS OF BREATH: 0
WHEEZING: 0
NAUSEA: 0

## 2018-06-11 ENCOUNTER — TELEPHONE (OUTPATIENT)
Dept: FAMILY MEDICINE CLINIC | Age: 68
End: 2018-06-11

## 2018-06-11 DIAGNOSIS — R59.9 ENLARGED LYMPH NODE: ICD-10-CM

## 2018-06-20 ENCOUNTER — OFFICE VISIT (OUTPATIENT)
Dept: FAMILY MEDICINE CLINIC | Age: 68
End: 2018-06-20

## 2018-06-20 VITALS
OXYGEN SATURATION: 96 % | WEIGHT: 149 LBS | SYSTOLIC BLOOD PRESSURE: 146 MMHG | HEART RATE: 72 BPM | HEIGHT: 59 IN | DIASTOLIC BLOOD PRESSURE: 82 MMHG | RESPIRATION RATE: 16 BRPM | BODY MASS INDEX: 30.04 KG/M2

## 2018-06-20 DIAGNOSIS — D72.829 LEUKOCYTOSIS, UNSPECIFIED TYPE: ICD-10-CM

## 2018-06-20 DIAGNOSIS — Z12.11 SCREENING FOR COLON CANCER: Primary | ICD-10-CM

## 2018-06-20 DIAGNOSIS — R59.9 ENLARGED LYMPH NODE: ICD-10-CM

## 2018-06-20 DIAGNOSIS — I10 ESSENTIAL HYPERTENSION: ICD-10-CM

## 2018-06-20 PROCEDURE — 1036F TOBACCO NON-USER: CPT | Performed by: INTERNAL MEDICINE

## 2018-06-20 PROCEDURE — G8417 CALC BMI ABV UP PARAM F/U: HCPCS | Performed by: INTERNAL MEDICINE

## 2018-06-20 PROCEDURE — 4040F PNEUMOC VAC/ADMIN/RCVD: CPT | Performed by: INTERNAL MEDICINE

## 2018-06-20 PROCEDURE — 3017F COLORECTAL CA SCREEN DOC REV: CPT | Performed by: INTERNAL MEDICINE

## 2018-06-20 PROCEDURE — G8399 PT W/DXA RESULTS DOCUMENT: HCPCS | Performed by: INTERNAL MEDICINE

## 2018-06-20 PROCEDURE — 1090F PRES/ABSN URINE INCON ASSESS: CPT | Performed by: INTERNAL MEDICINE

## 2018-06-20 PROCEDURE — 1123F ACP DISCUSS/DSCN MKR DOCD: CPT | Performed by: INTERNAL MEDICINE

## 2018-06-20 PROCEDURE — 99214 OFFICE O/P EST MOD 30 MIN: CPT | Performed by: INTERNAL MEDICINE

## 2018-06-20 PROCEDURE — G8427 DOCREV CUR MEDS BY ELIG CLIN: HCPCS | Performed by: INTERNAL MEDICINE

## 2018-06-20 RX ORDER — AMLODIPINE BESYLATE 10 MG/1
10 TABLET ORAL DAILY
Qty: 30 TABLET | Refills: 3 | Status: SHIPPED | OUTPATIENT
Start: 2018-06-20 | End: 2018-12-01 | Stop reason: SDUPTHER

## 2018-06-20 RX ORDER — CLINDAMYCIN HYDROCHLORIDE 150 MG/1
CAPSULE ORAL
Qty: 80 CAPSULE | Refills: 0 | Status: SHIPPED | OUTPATIENT
Start: 2018-06-20 | End: 2018-06-26

## 2018-06-20 RX ORDER — FUROSEMIDE 20 MG/1
20 TABLET ORAL DAILY
Qty: 90 TABLET | Refills: 1 | Status: SHIPPED | OUTPATIENT
Start: 2018-06-20 | End: 2018-12-18 | Stop reason: DRUGHIGH

## 2018-06-20 RX ORDER — NYSTATIN 100000 [USP'U]/G
POWDER TOPICAL
Qty: 1 BOTTLE | Refills: 0 | Status: SHIPPED | OUTPATIENT
Start: 2018-06-20 | End: 2018-09-27 | Stop reason: ALTCHOICE

## 2018-06-20 ASSESSMENT — ENCOUNTER SYMPTOMS
WHEEZING: 0
VOMITING: 0
SHORTNESS OF BREATH: 0
NAUSEA: 0

## 2018-06-20 ASSESSMENT — PATIENT HEALTH QUESTIONNAIRE - PHQ9
1. LITTLE INTEREST OR PLEASURE IN DOING THINGS: 0
SUM OF ALL RESPONSES TO PHQ9 QUESTIONS 1 & 2: 0
SUM OF ALL RESPONSES TO PHQ QUESTIONS 1-9: 0
2. FEELING DOWN, DEPRESSED OR HOPELESS: 0

## 2018-06-22 ENCOUNTER — TELEPHONE (OUTPATIENT)
Dept: DERMATOLOGY | Age: 68
End: 2018-06-22

## 2018-06-26 ENCOUNTER — OFFICE VISIT (OUTPATIENT)
Dept: DERMATOLOGY | Age: 68
End: 2018-06-26

## 2018-06-26 ENCOUNTER — TELEPHONE (OUTPATIENT)
Dept: DERMATOLOGY | Age: 68
End: 2018-06-26

## 2018-06-26 DIAGNOSIS — L90.9 SKIN ATROPHY FROM TOPICAL CORTISONE: ICD-10-CM

## 2018-06-26 DIAGNOSIS — L40.1 PUSTULAR PSORIASIS: Primary | ICD-10-CM

## 2018-06-26 DIAGNOSIS — T49.0X5A SKIN ATROPHY FROM TOPICAL CORTISONE: ICD-10-CM

## 2018-06-26 PROCEDURE — G8399 PT W/DXA RESULTS DOCUMENT: HCPCS | Performed by: DERMATOLOGY

## 2018-06-26 PROCEDURE — 1090F PRES/ABSN URINE INCON ASSESS: CPT | Performed by: DERMATOLOGY

## 2018-06-26 PROCEDURE — 1123F ACP DISCUSS/DSCN MKR DOCD: CPT | Performed by: DERMATOLOGY

## 2018-06-26 PROCEDURE — G8427 DOCREV CUR MEDS BY ELIG CLIN: HCPCS | Performed by: DERMATOLOGY

## 2018-06-26 PROCEDURE — 99213 OFFICE O/P EST LOW 20 MIN: CPT | Performed by: DERMATOLOGY

## 2018-06-26 PROCEDURE — G8417 CALC BMI ABV UP PARAM F/U: HCPCS | Performed by: DERMATOLOGY

## 2018-06-26 PROCEDURE — 4040F PNEUMOC VAC/ADMIN/RCVD: CPT | Performed by: DERMATOLOGY

## 2018-06-26 PROCEDURE — 1036F TOBACCO NON-USER: CPT | Performed by: DERMATOLOGY

## 2018-06-26 PROCEDURE — 3017F COLORECTAL CA SCREEN DOC REV: CPT | Performed by: DERMATOLOGY

## 2018-06-26 RX ORDER — TACROLIMUS 1 MG/G
OINTMENT TOPICAL
Qty: 60 G | Refills: 3 | Status: SHIPPED | OUTPATIENT
Start: 2018-06-26 | End: 2018-07-18

## 2018-07-18 ENCOUNTER — OFFICE VISIT (OUTPATIENT)
Dept: FAMILY MEDICINE CLINIC | Age: 68
End: 2018-07-18

## 2018-07-18 ENCOUNTER — TELEPHONE (OUTPATIENT)
Dept: DERMATOLOGY | Age: 68
End: 2018-07-18

## 2018-07-18 VITALS
RESPIRATION RATE: 16 BRPM | HEART RATE: 84 BPM | OXYGEN SATURATION: 97 % | HEIGHT: 59 IN | WEIGHT: 151 LBS | SYSTOLIC BLOOD PRESSURE: 160 MMHG | DIASTOLIC BLOOD PRESSURE: 90 MMHG | BODY MASS INDEX: 30.44 KG/M2

## 2018-07-18 DIAGNOSIS — I10 ESSENTIAL HYPERTENSION: ICD-10-CM

## 2018-07-18 DIAGNOSIS — J44.1 COPD EXACERBATION (HCC): ICD-10-CM

## 2018-07-18 DIAGNOSIS — R91.1 PULMONARY NODULE, LEFT: ICD-10-CM

## 2018-07-18 DIAGNOSIS — R59.0 MEDIASTINAL ADENOPATHY: ICD-10-CM

## 2018-07-18 DIAGNOSIS — R59.9 ENLARGED LYMPH NODE: Primary | ICD-10-CM

## 2018-07-18 DIAGNOSIS — R94.8 ABNORMAL PET SCAN OF COLON: ICD-10-CM

## 2018-07-18 DIAGNOSIS — R59.0 INGUINAL ADENOPATHY: ICD-10-CM

## 2018-07-18 PROCEDURE — G8427 DOCREV CUR MEDS BY ELIG CLIN: HCPCS | Performed by: INTERNAL MEDICINE

## 2018-07-18 PROCEDURE — 3017F COLORECTAL CA SCREEN DOC REV: CPT | Performed by: INTERNAL MEDICINE

## 2018-07-18 PROCEDURE — G8399 PT W/DXA RESULTS DOCUMENT: HCPCS | Performed by: INTERNAL MEDICINE

## 2018-07-18 PROCEDURE — G8417 CALC BMI ABV UP PARAM F/U: HCPCS | Performed by: INTERNAL MEDICINE

## 2018-07-18 PROCEDURE — 1123F ACP DISCUSS/DSCN MKR DOCD: CPT | Performed by: INTERNAL MEDICINE

## 2018-07-18 PROCEDURE — 99214 OFFICE O/P EST MOD 30 MIN: CPT | Performed by: INTERNAL MEDICINE

## 2018-07-18 PROCEDURE — 3023F SPIROM DOC REV: CPT | Performed by: INTERNAL MEDICINE

## 2018-07-18 PROCEDURE — G8926 SPIRO NO PERF OR DOC: HCPCS | Performed by: INTERNAL MEDICINE

## 2018-07-18 PROCEDURE — 1090F PRES/ABSN URINE INCON ASSESS: CPT | Performed by: INTERNAL MEDICINE

## 2018-07-18 PROCEDURE — 1101F PT FALLS ASSESS-DOCD LE1/YR: CPT | Performed by: INTERNAL MEDICINE

## 2018-07-18 PROCEDURE — 4040F PNEUMOC VAC/ADMIN/RCVD: CPT | Performed by: INTERNAL MEDICINE

## 2018-07-18 PROCEDURE — 1036F TOBACCO NON-USER: CPT | Performed by: INTERNAL MEDICINE

## 2018-07-18 RX ORDER — ALBUTEROL SULFATE 90 UG/1
2 AEROSOL, METERED RESPIRATORY (INHALATION) EVERY 6 HOURS PRN
Qty: 1 INHALER | Refills: 3 | Status: SHIPPED | OUTPATIENT
Start: 2018-07-18 | End: 2019-05-08 | Stop reason: SDUPTHER

## 2018-07-18 RX ORDER — CALCIPOTRIENE 50 UG/G
CREAM TOPICAL
Qty: 1 TUBE | Refills: 4 | OUTPATIENT
Start: 2018-07-18 | End: 2018-07-18

## 2018-07-18 ASSESSMENT — ENCOUNTER SYMPTOMS
VOMITING: 0
COLOR CHANGE: 1
SHORTNESS OF BREATH: 0
NAUSEA: 0
WHEEZING: 1

## 2018-07-18 ASSESSMENT — PATIENT HEALTH QUESTIONNAIRE - PHQ9
SUM OF ALL RESPONSES TO PHQ QUESTIONS 1-9: 0
SUM OF ALL RESPONSES TO PHQ9 QUESTIONS 1 & 2: 0
1. LITTLE INTEREST OR PLEASURE IN DOING THINGS: 0
2. FEELING DOWN, DEPRESSED OR HOPELESS: 0

## 2018-07-18 NOTE — TELEPHONE ENCOUNTER
Pharmacy called and the pt's rx for Protopic is $268 with ins and the pt does not want to pay that. Per  we can check and see if Elidel or Dovonex cream is covered and cheaper. I called [facility] and the Dovonex cream is the cheapest at $66.25, they will fill the rx and notify pt when it is ready for .

## 2018-07-18 NOTE — PROGRESS NOTES
activity: Not Asked     Other Topics Concern    None     Social History Narrative    None     Family History   Problem Relation Age of Onset    Other Father         Tuberculosis in his 34\"s in [address] Diabetes Mother     Coronary Art Dis Mother     Diabetes Sister     High Blood Pressure Sister     Coronary Art Dis Sister      Prior to Visit Medications    Medication Sig Taking? Authorizing Provider   furosemide (LASIX) 20 MG tablet Take 1 tablet by mouth daily Yes Waqar Segundo MD   amLODIPine (NORVASC) 10 MG tablet Take 1 tablet by mouth daily Yes Waqar Segundo MD   Respiratory Therapy Supplies (NEBULIZER/TUBING/MOUTHPIECE) KIT 1 kit by Does not apply route daily Yes Ryan Bower MD   ipratropium-albuterol (DUONEB) 0.5-2.5 (3) MG/3ML SOLN nebulizer solution Inhale 3 mLs into the lungs every 4 hours Yes Ryan Bower MD   fluticasone-salmeterol (ADVAIR DISKUS) 250-50 MCG/DOSE AEPB INHALE ONE PUFF INTO THE LUNGS EVERY 12 HOURS Yes Scar Painting MD   albuterol (PROAIR HFA) 108 (90 BASE) MCG/ACT inhaler Inhale 2 puffs into the lungs every 6 hours as needed for Wheezing Yes Dora Murry MD   nystatin (MYCOSTATIN) 893586 UNIT/GM powder Apply 3 times daily.   Waqar Segundo MD     Patient Active Problem List   Diagnosis    COPD (chronic obstructive pulmonary disease)--pfts show severe obst defect w severe decrease dlco  +bronchodilator response 6/14--repeat unchanged    Tobacco abuse-pt quit 7/15--cold turkey   [address] maintenance--last mammo 9/15--wnl    Pulmonary nodule, left- on lung cancer screening program-  pet scan 6/16=unchanged billy nodule-little activity lung--pos activoity ge jct (still need to cont puln nod surv x 2 yrs)    Dependent edema--echo 10/15--nl ef--mild diast dysf--advised low salt diet/ stockings/ wt loss    Essential hypertension    Diastolic dysfunction--mild on echo 10/15--nl ef    Pustular psoriasis, Dr. Carmelina Mariano Respiratory failure [facility])    Acute respiratory failure with hypoxemia (HCC)    COPD exacerbation (HCC)    Centrilobular emphysema (HCC)    Mediastinal adenopathy    Lung nodule    SIRS (systemic inflammatory response syndrome) (HCC)    Leukocytosis    Ex-smoker    Tobacco abuse counseling    Elevated brain natriuretic peptide (BNP) level    Enlarged lymph node enlarged ing ly node 2018        LABS:   Lab Results   Component Value Date    GLUCOSE 96 05/23/2018     Lab Results   Component Value Date     05/23/2018    K 5.1 05/23/2018    CREATININE 0.7 05/23/2018     Cholesterol, Total   Date Value Ref Range Status   07/26/2017 166 0 - 199 mg/dL Final     LDL Calculated   Date Value Ref Range Status   07/26/2017 66 <100 mg/dL Final     HDL   Date Value Ref Range Status   07/26/2017 85 (H) 40 - 60 mg/dL Final     Triglycerides   Date Value Ref Range Status   07/26/2017 73 0 - 150 mg/dL Final     Lab Results   Component Value Date    ALT 17 03/12/2018    AST 19 03/12/2018    ALKPHOS 90 03/12/2018    BILITOT 0.4 03/12/2018      Lab Results   Component Value Date    WBC 9.4 05/29/2018    HGB 13.2 05/29/2018    HCT 39.6 05/29/2018    MCV 82.6 05/29/2018     05/29/2018     No results found for: TSH  Lab Results   Component Value Date    LABA1C 5.0 03/12/2018     No results found for: PSA, PSADIA     PHYSICAL EXAM:  BP (!) 160/90 (Site: Left Arm, Position: Sitting, Cuff Size: Medium Adult)   Pulse 84   Resp 16   Ht 4' 11\" (1.499 m)   Wt 151 lb (68.5 kg)   SpO2 97%   BMI 30.50 kg/m²    Physical Exam   Constitutional: She appears well-developed and well-nourished. HENT:   Head: Normocephalic and atraumatic. Cardiovascular: Normal rate and regular rhythm. No murmur heard. Pulmonary/Chest: Effort normal and breath sounds normal. She has no wheezes. Abdominal: Soft. There is no tenderness. Neurological: She is alert. Skin: Skin is warm and dry. Psychiatric: She has a normal mood and affect.  Her behavior is normal. Judgment and thought content normal.     BP Readings from Last 5 Encounters:   07/18/18 (!) 160/90   06/20/18 (!) 146/82   05/29/18 (!) 144/88   05/15/18 138/86   04/11/18 138/82       Wt Readings from Last 5 Encounters:   07/18/18 151 lb (68.5 kg)   06/20/18 149 lb (67.6 kg)   05/29/18 148 lb (67.1 kg)   05/15/18 146 lb (66.2 kg)   04/11/18 141 lb (64 kg)        ASSESSMENT/PLAN:    Diagnoses and all orders for this visit:    Enlarged lymph node enlarged ing ly node 2018    Mediastinal adenopathy    Essential hypertension    Pulmonary nodule, left- on lung cancer screening program-  pet scan 6/16=unchanged billy nodule-little activity lung--pos activoity ge jct (still need to cont puln nod surv x 2 yrs)    COPD exacerbation (HCC)    Inguinal adenopathy    Abnormal PET scan of colon    Other orders  -     albuterol sulfate HFA (PROAIR HFA) 108 (90 Base) MCG/ACT inhaler; Inhale 2 puffs into the lungs every 6 hours as needed for Wheezing    needs egd/colonoscopy. Saw dr Opal Mccollum 7/16   Has had enlarged ly nodes. Needs full work up. Had abn pet scan and was ref to gi ,but did not follow through with egd/colonoscopy    Did not make appt with heme as directed    Confused did not take advair as rx,   Educated her on taking advair daily. Was wheezing today  Fu in  2 weeks - after compliance with advair. Fu with derm for less expensive cream for her leg  Barrier seems to be that she does not always understand instructions or has a fear of doing test.    I am not sure why there is so much non compliance. Seems to understand English well enough.      Placed a new updated ref to dr Jaret Grajeda today

## 2018-08-01 ENCOUNTER — OFFICE VISIT (OUTPATIENT)
Dept: FAMILY MEDICINE CLINIC | Age: 68
End: 2018-08-01

## 2018-08-01 ENCOUNTER — HOSPITAL ENCOUNTER (OUTPATIENT)
Dept: ULTRASOUND IMAGING | Age: 68
Discharge: HOME | End: 2018-08-01
Attending: INTERNAL MEDICINE | Admitting: INTERNAL MEDICINE

## 2018-08-01 VITALS
HEIGHT: 59 IN | WEIGHT: 150 LBS | DIASTOLIC BLOOD PRESSURE: 86 MMHG | HEART RATE: 88 BPM | SYSTOLIC BLOOD PRESSURE: 138 MMHG | RESPIRATION RATE: 16 BRPM | BODY MASS INDEX: 30.24 KG/M2 | OXYGEN SATURATION: 98 %

## 2018-08-01 DIAGNOSIS — R59.0 LOCALIZED ENLARGED LYMPH NODES: ICD-10-CM

## 2018-08-01 DIAGNOSIS — J43.2 CENTRILOBULAR EMPHYSEMA (HCC): ICD-10-CM

## 2018-08-01 DIAGNOSIS — R59.0 BILATERAL HILAR ADENOPATHY SYNDROME: ICD-10-CM

## 2018-08-01 DIAGNOSIS — I10 ESSENTIAL HYPERTENSION: Primary | ICD-10-CM

## 2018-08-01 PROCEDURE — G8399 PT W/DXA RESULTS DOCUMENT: HCPCS | Performed by: INTERNAL MEDICINE

## 2018-08-01 PROCEDURE — G8926 SPIRO NO PERF OR DOC: HCPCS | Performed by: INTERNAL MEDICINE

## 2018-08-01 PROCEDURE — G8417 CALC BMI ABV UP PARAM F/U: HCPCS | Performed by: INTERNAL MEDICINE

## 2018-08-01 PROCEDURE — 3023F SPIROM DOC REV: CPT | Performed by: INTERNAL MEDICINE

## 2018-08-01 PROCEDURE — 4040F PNEUMOC VAC/ADMIN/RCVD: CPT | Performed by: INTERNAL MEDICINE

## 2018-08-01 PROCEDURE — G8427 DOCREV CUR MEDS BY ELIG CLIN: HCPCS | Performed by: INTERNAL MEDICINE

## 2018-08-01 PROCEDURE — G8510 SCR DEP NEG, NO PLAN REQD: HCPCS | Performed by: INTERNAL MEDICINE

## 2018-08-01 PROCEDURE — 1036F TOBACCO NON-USER: CPT | Performed by: INTERNAL MEDICINE

## 2018-08-01 PROCEDURE — 1101F PT FALLS ASSESS-DOCD LE1/YR: CPT | Performed by: INTERNAL MEDICINE

## 2018-08-01 PROCEDURE — 99213 OFFICE O/P EST LOW 20 MIN: CPT | Performed by: INTERNAL MEDICINE

## 2018-08-01 PROCEDURE — 3017F COLORECTAL CA SCREEN DOC REV: CPT | Performed by: INTERNAL MEDICINE

## 2018-08-01 PROCEDURE — 1123F ACP DISCUSS/DSCN MKR DOCD: CPT | Performed by: INTERNAL MEDICINE

## 2018-08-01 PROCEDURE — 1090F PRES/ABSN URINE INCON ASSESS: CPT | Performed by: INTERNAL MEDICINE

## 2018-08-01 ASSESSMENT — ENCOUNTER SYMPTOMS
SHORTNESS OF BREATH: 0
WHEEZING: 0
DIARRHEA: 0
CONSTIPATION: 0

## 2018-08-01 ASSESSMENT — PATIENT HEALTH QUESTIONNAIRE - PHQ9
SUM OF ALL RESPONSES TO PHQ9 QUESTIONS 1 & 2: 0
1. LITTLE INTEREST OR PLEASURE IN DOING THINGS: 0
2. FEELING DOWN, DEPRESSED OR HOPELESS: 0
SUM OF ALL RESPONSES TO PHQ QUESTIONS 1-9: 0

## 2018-08-01 NOTE — PROGRESS NOTES
Last 5 Encounters:   08/01/18 150 lb (68 kg)   07/18/18 151 lb (68.5 kg)   06/20/18 149 lb (67.6 kg)   05/29/18 148 lb (67.1 kg)   05/15/18 146 lb (66.2 kg)        ASSESSMENT/PLAN:    Orlin Casarez was seen today for hypertension and wheezing. Diagnoses and all orders for this visit:    Essential hypertension    Centrilobular emphysema ([address]    bp is much better. Will add another med if needed.   For now continue the norvasc  Today she get bx of ly node  Ordered by dr Margart Epley  Lungs  Just very slight wheeze   ,   Not coughing  Fu in  6 weeks

## 2018-08-30 ENCOUNTER — OFFICE VISIT (OUTPATIENT)
Dept: DERMATOLOGY | Age: 68
End: 2018-08-30

## 2018-08-30 DIAGNOSIS — L40.1 PUSTULAR PSORIASIS: Primary | ICD-10-CM

## 2018-08-30 DIAGNOSIS — R60.0 LEG EDEMA, RIGHT: ICD-10-CM

## 2018-08-30 PROCEDURE — 1036F TOBACCO NON-USER: CPT | Performed by: DERMATOLOGY

## 2018-08-30 PROCEDURE — 1090F PRES/ABSN URINE INCON ASSESS: CPT | Performed by: DERMATOLOGY

## 2018-08-30 PROCEDURE — 1123F ACP DISCUSS/DSCN MKR DOCD: CPT | Performed by: DERMATOLOGY

## 2018-08-30 PROCEDURE — G8399 PT W/DXA RESULTS DOCUMENT: HCPCS | Performed by: DERMATOLOGY

## 2018-08-30 PROCEDURE — 99213 OFFICE O/P EST LOW 20 MIN: CPT | Performed by: DERMATOLOGY

## 2018-08-30 PROCEDURE — 4040F PNEUMOC VAC/ADMIN/RCVD: CPT | Performed by: DERMATOLOGY

## 2018-08-30 PROCEDURE — 3017F COLORECTAL CA SCREEN DOC REV: CPT | Performed by: DERMATOLOGY

## 2018-08-30 PROCEDURE — G8417 CALC BMI ABV UP PARAM F/U: HCPCS | Performed by: DERMATOLOGY

## 2018-08-30 PROCEDURE — 1101F PT FALLS ASSESS-DOCD LE1/YR: CPT | Performed by: DERMATOLOGY

## 2018-08-30 PROCEDURE — G8427 DOCREV CUR MEDS BY ELIG CLIN: HCPCS | Performed by: DERMATOLOGY

## 2018-08-30 RX ORDER — CLOBETASOL PROPIONATE 0.5 MG/G
OINTMENT TOPICAL
Qty: 45 G | Refills: 2 | Status: SHIPPED | OUTPATIENT
Start: 2018-08-30 | End: 2018-09-05

## 2018-09-04 ENCOUNTER — TELEPHONE (OUTPATIENT)
Dept: FAMILY MEDICINE CLINIC | Age: 68
End: 2018-09-04

## 2018-09-05 ENCOUNTER — ANTI-COAG VISIT (OUTPATIENT)
Dept: PHARMACY | Facility: CLINIC | Age: 68
End: 2018-09-05

## 2018-09-05 ENCOUNTER — HOSPITAL ENCOUNTER (OUTPATIENT)
Dept: VASCULAR LAB | Age: 68
Discharge: HOME | End: 2018-09-05
Attending: NURSE PRACTITIONER | Admitting: NURSE PRACTITIONER

## 2018-09-05 ENCOUNTER — OFFICE VISIT (OUTPATIENT)
Dept: FAMILY MEDICINE CLINIC | Age: 68
End: 2018-09-05

## 2018-09-05 ENCOUNTER — TELEPHONE (OUTPATIENT)
Dept: FAMILY MEDICINE CLINIC | Age: 68
End: 2018-09-05

## 2018-09-05 ENCOUNTER — HOSPITAL ENCOUNTER (OUTPATIENT)
Dept: CT IMAGING | Age: 68
Discharge: HOME | End: 2018-09-05
Attending: INTERNAL MEDICINE | Admitting: INTERNAL MEDICINE

## 2018-09-05 ENCOUNTER — CARE COORDINATION (OUTPATIENT)
Dept: CARE COORDINATION | Age: 68
End: 2018-09-05

## 2018-09-05 VITALS
SYSTOLIC BLOOD PRESSURE: 156 MMHG | RESPIRATION RATE: 16 BRPM | BODY MASS INDEX: 30.84 KG/M2 | HEIGHT: 59 IN | WEIGHT: 153 LBS | OXYGEN SATURATION: 98 % | HEART RATE: 100 BPM | DIASTOLIC BLOOD PRESSURE: 88 MMHG

## 2018-09-05 DIAGNOSIS — I82.401 ACUTE EMBOLISM AND THROMBOSIS OF DEEP VEIN OF RIGHT LOWER EXTREMITY (HCC): ICD-10-CM

## 2018-09-05 DIAGNOSIS — I82.491 ACUTE DEEP VEIN THROMBOSIS (DVT) OF OTHER SPECIFIED VEIN OF RIGHT LOWER EXTREMITY (HCC): ICD-10-CM

## 2018-09-05 DIAGNOSIS — I82.491 ACUTE DEEP VEIN THROMBOSIS (DVT) OF OTHER SPECIFIED VEIN OF RIGHT LOWER EXTREMITY (HCC): Primary | ICD-10-CM

## 2018-09-05 DIAGNOSIS — I82.401 ACUTE DEEP VEIN THROMBOSIS (DVT) OF RIGHT LOWER EXTREMITY, UNSPECIFIED VEIN (HCC): ICD-10-CM

## 2018-09-05 PROCEDURE — 3017F COLORECTAL CA SCREEN DOC REV: CPT | Performed by: INTERNAL MEDICINE

## 2018-09-05 PROCEDURE — G8399 PT W/DXA RESULTS DOCUMENT: HCPCS | Performed by: INTERNAL MEDICINE

## 2018-09-05 PROCEDURE — G8417 CALC BMI ABV UP PARAM F/U: HCPCS | Performed by: INTERNAL MEDICINE

## 2018-09-05 PROCEDURE — 99214 OFFICE O/P EST MOD 30 MIN: CPT | Performed by: INTERNAL MEDICINE

## 2018-09-05 PROCEDURE — G8427 DOCREV CUR MEDS BY ELIG CLIN: HCPCS | Performed by: INTERNAL MEDICINE

## 2018-09-05 PROCEDURE — 1090F PRES/ABSN URINE INCON ASSESS: CPT | Performed by: INTERNAL MEDICINE

## 2018-09-05 PROCEDURE — 1101F PT FALLS ASSESS-DOCD LE1/YR: CPT | Performed by: INTERNAL MEDICINE

## 2018-09-05 PROCEDURE — 1123F ACP DISCUSS/DSCN MKR DOCD: CPT | Performed by: INTERNAL MEDICINE

## 2018-09-05 PROCEDURE — 1036F TOBACCO NON-USER: CPT | Performed by: INTERNAL MEDICINE

## 2018-09-05 PROCEDURE — 4040F PNEUMOC VAC/ADMIN/RCVD: CPT | Performed by: INTERNAL MEDICINE

## 2018-09-05 ASSESSMENT — ENCOUNTER SYMPTOMS
SHORTNESS OF BREATH: 1
CONSTIPATION: 0
DIARRHEA: 0
WHEEZING: 1

## 2018-09-05 NOTE — PROGRESS NOTES
9/5/2018    This is a 77 y.o. female   Chief Complaint   Patient presents with    Other     f/u dvt, has been off Eliquis since Monday   . the patient had a bedside us  That was concerning for dvt  And was supposed to fu with a doppler the next day. However it has been 6 days since the ER visit and she is scheduled for the ultrasound today. Today will have a fu ultrasound on the right leg  Had dvt diag last Thursday . Seen in the ER from dermatology office. Had apixaban - was taking  10 mg q 12 hrs. Ran out Monday night  Today is wed    Right leg swollen   3 weeks. Has sob yesterday when going to grocery - not unusual for her  No chest pain. HPI    Review of Systems   Constitutional: Negative for appetite change, chills, fever and unexpected weight change. Respiratory: Positive for shortness of breath (normal for her) and wheezing. Gastrointestinal: Negative for constipation and diarrhea. Neurological: Positive for headaches (left face). Negative for dizziness and light-headedness. Past Medical History:   Diagnosis Date    COPD (chronic obstructive pulmonary disease) (HCC)     Glaucoma     Hypertension        Prior to Visit Medications    Medication Sig Taking?  Authorizing Provider   albuterol sulfate HFA (PROAIR HFA) 108 (90 Base) MCG/ACT inhaler Inhale 2 puffs into the lungs every 6 hours as needed for Wheezing  Patient taking differently: Inhale 2 puffs into the lungs every 6 hours as needed for Wheezing  Yes Bryan Gauthier MD   furosemide (LASIX) 20 MG tablet Take 1 tablet by mouth daily Yes Bryan Gauthier MD   amLODIPine (NORVASC) 10 MG tablet Take 1 tablet by mouth daily Yes Bryan Gauthier MD   Respiratory Therapy Supplies (NEBULIZER/TUBING/MOUTHPIECE) KIT 1 kit by Does not apply route daily Yes Franck Abreu MD   ipratropium-albuterol (DUONEB) 0.5-2.5 (3) MG/3ML SOLN nebulizer solution Inhale 3 mLs into the lungs every 4 hours Yes Franck Abreu MD fluticasone-salmeterol (ADVAIR DISKUS) 250-50 MCG/DOSE AEPB INHALE ONE PUFF INTO THE LUNGS EVERY 12 HOURS Yes Cris Reynolds MD   nystatin (MYCOSTATIN) 070703 UNIT/GM powder Apply 3 times daily. Scar Cox MD       No past surgical history on file.     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     Social History Main Topics    Smoking status: Former Smoker     Packs/day: 0.50     Years: 11.00     Types: Cigarettes     Start date: 1/1/1966     Quit date: 8/3/2015    Smokeless tobacco: Never Used      Comment: counseled on tobacco exposure avoidance    Alcohol use No      Comment: Socially    Drug use: No    Sexual activity: Not on file     Other Topics Concern    Not on file     Social History Narrative    No narrative on file       Family History   Problem Relation Age of Onset    Other Father         Tuberculosis in his 34\"s in [address] Diabetes Mother     Coronary Art Dis Mother     Diabetes Sister     High Blood Pressure Sister     Coronary Art Dis Sister        Current Outpatient Prescriptions   Medication Sig Dispense Refill    albuterol sulfate HFA (PROAIR HFA) 108 (90 Base) MCG/ACT inhaler Inhale 2 puffs into the lungs every 6 hours as needed for Wheezing (Patient taking differently: Inhale 2 puffs into the lungs every 6 hours as needed for Wheezing ) 1 Inhaler 3    furosemide (LASIX) 20 MG tablet Take 1 tablet by mouth daily 90 tablet 1    amLODIPine (NORVASC) 10 MG tablet Take 1 tablet by mouth daily 30 tablet 3    Respiratory Therapy Supplies (NEBULIZER/TUBING/MOUTHPIECE) KIT 1 kit by Does not apply route daily 1 kit 0    ipratropium-albuterol (DUONEB) 0.5-2.5 (3) MG/3ML SOLN nebulizer solution Inhale 3 mLs into the lungs every 4 hours 360 mL 5    fluticasone-salmeterol (ADVAIR DISKUS) 250-50 MCG/DOSE AEPB INHALE ONE PUFF INTO THE LUNGS EVERY 12 HOURS 3 Inhaler 1    nystatin (MYCOSTATIN) 022677 UNIT/GM powder Apply 3 times daily. 1 Bottle 0     Current Facility-Administered Medications   Medication Dose Route Frequency Provider Last Rate Last Dose    albuterol (PROVENTIL) nebulizer solution 2.5 mg  2.5 mg Nebulization Once Lino Zapien MD           Allergies   Allergen Reactions    Sulfa Antibiotics Rash       BP (!) 156/88 (Site: Left Arm, Position: Sitting, Cuff Size: Medium Adult)   Pulse 100   Resp 16   Ht 4' 11\" (1.499 m)   Wt 153 lb (69.4 kg)   SpO2 98%   BMI 30.90 kg/m²     Physical Exam   Constitutional: She appears well-developed and well-nourished. HENT:   Head: Normocephalic and atraumatic. Cardiovascular: Normal rate and regular rhythm. No murmur heard. Hr  100   Pulmonary/Chest: Breath sounds normal. She has no wheezes. Abdominal: Soft. There is no tenderness. Musculoskeletal:   Tight Edema in the right lower ext  In the calf and tenderness in the calf  Rash over both tibial areas    No definate ly nodes palp in the right groin   Neurological: She is alert. Skin: Skin is warm and dry. Psychiatric: She has a normal mood and affect. Her behavior is normal. Judgment and thought content normal.       Wt Readings from Last 3 Encounters:   09/05/18 153 lb (69.4 kg)   08/30/18 153 lb 7 oz (69.6 kg)   08/01/18 150 lb (68 kg)       BP Readings from Last 3 Encounters:   09/05/18 (!) 156/88   08/30/18 (!) 154/65   08/01/18 138/86         Plan  [place] Para was seen today for other. Diagnoses and all orders for this visit:    Acute deep vein thrombosis (DVT) of other specified vein of right lower extremity ([address]- per ER report , needs confirmatory doppler  -     Cancel: CTA PULMONARY W CONTRAST; Future  -     CT CHEST PULMONARY EMBOLISM W CONTRAST; Future    Other orders  -     apixaban (ELIQUIS) 5 MG TABS tablet;  Take 1 tablet by mouth 2 times daily  Gave pt samples of eliquis and also gave her the 10 mg dose today in the office. She should continue 10 mg bid through Friday then on Saturday start  5 mg bid  Ck ct to make sure no pe  She is already getting the repeat doppler today of right leg and I would like the ly node to be evaluated in the right groin. She apparently had a left groin ly node bx , not the right ly node. Addendum  Doppler showed no dvt  Had ly node 4.6  X  .7 cm no the right  Discussed with dr Wendy Paul. Will have her get gyn exam asap  May need excisional bx of node  Stop the eliquis since the doppler is neg. Tried to call pt , left message to call office.

## 2018-09-05 NOTE — CARE COORDINATION
(clean home, laundry): Independent  Is patient able to live independently?:  Yes                    Thinking about your patient's physical health needs, are there any symptoms or problems (risk indicators) you are unsure about that require further investigation?:  No identified areas of uncertainly or problems already being investigated   Are the patients physical health problems impacting on their mental well-being?:  Mild impact on mental well-being e.g. \"\"feeling fed-up\"\", \"\"reduced enjoyment\"\"   Are there any problems with your patients lifestyle behaviors (alcohol, drugs, diet, exercise) that are impacting on physical or mental well-being?:  No identified areas of concern   Do you have any other concerns about your patients mental well-being? How would you rate their severity and impact on the patient?:  No identified areas of concern   How do daily activities impact on the patient's well-being? (include current or anticipated unemployment, work, caregiving, access to transportation or other):  Some general dissatisfaction but no concern   How wells does the patient now understand their health and well-being (symptoms, signs or risk factors) and what they need to do to manage their health?:  Reasonable to good understanding and already engages in managing health or is willing to undertake better management   How well do you think your patient can engage in healthcare discussions? (Barriers include language, deafness, aphasia, alcohol or drug problems, learning difficulties, concentration): Adequate communication, with or without minor barriers   Suggested Interventions and [employer] Set or Pill Pack:  Declined   Zone Management Tools:  Not Started                  Prior to Admission medications    Medication Sig Start Date End Date Taking?  Authorizing Provider   apixaban (ELIQUIS) 5 MG TABS tablet Take 1 tablet by mouth 2 times daily 9/5/18   Janna Queen MD   albuterol sulfate HFA

## 2018-09-06 ENCOUNTER — TELEPHONE (OUTPATIENT)
Dept: GYNECOLOGY | Age: 68
End: 2018-09-06

## 2018-09-06 ENCOUNTER — CARE COORDINATION (OUTPATIENT)
Dept: CARE COORDINATION | Age: 68
End: 2018-09-06

## 2018-09-06 DIAGNOSIS — R59.0 INGUINAL ADENOPATHY: Primary | ICD-10-CM

## 2018-09-06 DIAGNOSIS — R60.0 EDEMA OF RIGHT LOWER EXTREMITY: ICD-10-CM

## 2018-09-06 NOTE — TELEPHONE ENCOUNTER
Dr Matute [facility] office wanted pt sched so I put her in for tomorrow. Calling to make sure that she is able to make appt.

## 2018-09-06 NOTE — CARE COORDINATION
Ambulatory Care Coordination Note  9/6/2018  CM Risk Score: 5  Anshul Mortality Risk Score: 4.37    ACC: Jossie Christianson RN      Summary Note:   Noted Dr. Qing Forman note in chart and contacted patient-See PCP\"s note below:    Tell her NO  Blood clot was seen on the ultrasound. Stop the Eliquis now. She has a large lymph node on the right that might be causing the swelling. Tell her I discussed with dr Margart Epley. We want her to have a gyn exam Pap smear. I sent message to dr Hellen Koyanagi  To see if he can see her very soon. otw I can do pap in the office on Friday. FYI:  This RNCC read Dr. Qing Forman note word for word. Patient verbalized understanding with teach back  Patient has not taken today's dose of Eliquis and she agrees to discontinue and discard the medication. Instructed patient to contact Dr. Talia Haque office today to schedule gyn exam and pap smear today or tomorrow. If she is unable to get an appointment with Dr. Hellen Koyanagi, she is to contact Dr. Qing Forman office to schedule PAP with her on Friday. Patient verbalized understanding of all instructions   Provided Dr. Talia Haque contact information [phone]       General Assessment    Do you have any symptoms that are causing concern?:  No       Care Coordination Interventions    Program Enrollment:  Rising Risk  Referral from Primary Care Provider:  No  Suggested Interventions and Community Resources  Medi Set or Pill Pack:  Declined  Zone Management Tools:  Not Started         Goals Addressed     None          Prior to Admission medications    Medication Sig Start Date End Date Taking?  Authorizing Provider   apixaban (ELIQUIS) 5 MG TABS tablet Take 1 tablet by mouth 2 times daily 9/5/18   Bryan Gauthier MD   albuterol sulfate HFA (PROAIR HFA) 108 (90 Base) MCG/ACT inhaler Inhale 2 puffs into the lungs every 6 hours as needed for Wheezing  Patient taking differently: Inhale 2 puffs into the lungs every 6 hours as needed for Wheezing  7/18/18 Marilou Davidson MD   furosemide (LASIX) 20 MG tablet Take 1 tablet by mouth daily 6/20/18   Marilou Davidson MD   nystatin (MYCOSTATIN) 403436 UNIT/GM powder Apply 3 times daily.  6/20/18   Marilou Davidson MD   amLODIPine (NORVASC) 10 MG tablet Take 1 tablet by mouth daily 6/20/18   Marilou Davidson MD   Respiratory Therapy Supplies (NEBULIZER/TUBING/MOUTHPIECE) KIT 1 kit by Does not apply route daily 1/4/18   Geeta Thomas MD   ipratropium-albuterol (DUONEB) 0.5-2.5 (3) MG/3ML SOLN nebulizer solution Inhale 3 mLs into the lungs every 4 hours 1/4/18   Geeta Thomas MD   fluticasone-salmeterol (ADVAIR DISKUS) 250-50 MCG/DOSE AEPB INHALE ONE PUFF INTO THE LUNGS EVERY 12 HOURS 11/28/16   Rubio Rose MD       Future Appointments  Date Time Provider Gloria Anabela   9/20/2018 9:00 AM Nereyda Rodrigues MD [facility] None   10/10/2018 11:00 AM Geeta Thomas MD [facility]

## 2018-09-07 ENCOUNTER — HOSPITAL ENCOUNTER (OUTPATIENT)
Dept: ULTRASOUND IMAGING | Age: 68
Discharge: HOME | End: 2018-09-07
Attending: INTERNAL MEDICINE | Admitting: INTERNAL MEDICINE

## 2018-09-07 ENCOUNTER — OFFICE VISIT (OUTPATIENT)
Dept: GYNECOLOGY | Age: 68
End: 2018-09-07

## 2018-09-07 VITALS
SYSTOLIC BLOOD PRESSURE: 134 MMHG | WEIGHT: 154 LBS | HEIGHT: 59 IN | HEART RATE: 103 BPM | BODY MASS INDEX: 31.04 KG/M2 | TEMPERATURE: 97.3 F | DIASTOLIC BLOOD PRESSURE: 79 MMHG

## 2018-09-07 DIAGNOSIS — R59.0 INGUINAL ADENOPATHY: ICD-10-CM

## 2018-09-07 DIAGNOSIS — R60.0 EDEMA OF RIGHT LOWER EXTREMITY: ICD-10-CM

## 2018-09-07 DIAGNOSIS — N84.1 CERVICAL POLYP: ICD-10-CM

## 2018-09-07 DIAGNOSIS — R60.0 LOCALIZED EDEMA: ICD-10-CM

## 2018-09-07 DIAGNOSIS — Z01.419 WELL WOMAN EXAM WITH ROUTINE GYNECOLOGICAL EXAM: Primary | ICD-10-CM

## 2018-09-07 PROCEDURE — 57500 BIOPSY OF CERVIX: CPT | Performed by: OBSTETRICS & GYNECOLOGY

## 2018-09-07 PROCEDURE — G8427 DOCREV CUR MEDS BY ELIG CLIN: HCPCS | Performed by: OBSTETRICS & GYNECOLOGY

## 2018-09-07 PROCEDURE — G8417 CALC BMI ABV UP PARAM F/U: HCPCS | Performed by: OBSTETRICS & GYNECOLOGY

## 2018-09-07 PROCEDURE — G0101 CA SCREEN;PELVIC/BREAST EXAM: HCPCS | Performed by: OBSTETRICS & GYNECOLOGY

## 2018-09-07 NOTE — PROGRESS NOTES
Subjective:      Patient ID: Lito Moon is a 77 y.o. female. HPI  pts referred by her pcp. Has right leg swelling and recent bx of enlarged groin lymph node (benign). No recent colonoscopy or pap. Last period over 20 years ago. No bleeding. Mammogram one year ago. Review of Systems Pertinent review of systems items discussed above. All others systems items not discussed above were negative. Objective:   Physical Exam   Constitutional: She is oriented to person, place, and time. She appears well-developed and well-nourished. HENT:   Head: Normocephalic and atraumatic. Neck: No tracheal deviation present. No thyromegaly present. Cardiovascular: Normal rate, regular rhythm and normal heart sounds. No murmur heard. Pulmonary/Chest: Effort normal and breath sounds normal. No respiratory distress. She has no wheezes. She has no rales. Right breast exhibits no mass, no nipple discharge and no skin change. Left breast exhibits no mass, no nipple discharge and no skin change. Abdominal: Soft. She exhibits no distension and no mass. There is no tenderness. There is no rebound. Genitourinary: Rectum normal, vagina normal and uterus normal. Rectal exam shows no external hemorrhoid, no mass and guaiac negative stool. No breast tenderness (no masses), discharge or bleeding. There is no lesion on the right labia. There is no lesion on the left labia. Uterus is not deviated, not enlarged, not fixed and not tender. Cervix exhibits no motion tenderness, no discharge and no friability. Right adnexum displays no mass and no tenderness. Left adnexum displays no mass and no tenderness. No foreign body in the vagina. No vaginal discharge found. Genitourinary Comments: Pap performed. Musculoskeletal: Normal range of motion. Lymphadenopathy:     She has no cervical adenopathy. Neurological: She is alert and oriented to person, place, and time.        Procedure Note  Pre op:  Cervical polyp  Post op:

## 2018-09-07 NOTE — PROGRESS NOTES
referred by dr aMlini Pulido. concern with gyn cancer. enlarged lymph node. 10/2017 last mammo. Sept 20 colonoscopy. Last pap over 15 years ago.

## 2018-09-10 ENCOUNTER — TELEPHONE (OUTPATIENT)
Dept: GYNECOLOGY | Age: 68
End: 2018-09-10

## 2018-09-11 ENCOUNTER — OFFICE VISIT (OUTPATIENT)
Dept: FAMILY MEDICINE CLINIC | Age: 68
End: 2018-09-11

## 2018-09-11 VITALS
WEIGHT: 156 LBS | HEIGHT: 59 IN | HEART RATE: 86 BPM | RESPIRATION RATE: 14 BRPM | BODY MASS INDEX: 31.45 KG/M2 | OXYGEN SATURATION: 97 % | SYSTOLIC BLOOD PRESSURE: 124 MMHG | DIASTOLIC BLOOD PRESSURE: 78 MMHG

## 2018-09-11 DIAGNOSIS — R59.0 INGUINAL LYMPHADENOPATHY: ICD-10-CM

## 2018-09-11 DIAGNOSIS — R60.0 LEG EDEMA, RIGHT: Primary | ICD-10-CM

## 2018-09-11 DIAGNOSIS — R60.0 LEG EDEMA, RIGHT: ICD-10-CM

## 2018-09-11 LAB
A/G RATIO: 1.6 (ref 1.1–2.2)
ALBUMIN SERPL-MCNC: 4.5 G/DL (ref 3.4–5)
ALP BLD-CCNC: 110 U/L (ref 40–129)
ALT SERPL-CCNC: 26 U/L (ref 10–40)
ANION GAP SERPL CALCULATED.3IONS-SCNC: 17 MMOL/L (ref 3–16)
AST SERPL-CCNC: 28 U/L (ref 15–37)
BASOPHILS ABSOLUTE: 0 K/UL (ref 0–0.2)
BASOPHILS RELATIVE PERCENT: 0.4 %
BILIRUB SERPL-MCNC: 0.3 MG/DL (ref 0–1)
BUN BLDV-MCNC: 11 MG/DL (ref 7–20)
CALCIUM SERPL-MCNC: 9.6 MG/DL (ref 8.3–10.6)
CHLORIDE BLD-SCNC: 101 MMOL/L (ref 99–110)
CO2: 26 MMOL/L (ref 21–32)
CREAT SERPL-MCNC: 0.8 MG/DL (ref 0.6–1.2)
EOSINOPHILS ABSOLUTE: 0.1 K/UL (ref 0–0.6)
EOSINOPHILS RELATIVE PERCENT: 1.2 %
GFR AFRICAN AMERICAN: >60
GFR NON-AFRICAN AMERICAN: >60
GLOBULIN: 2.9 G/DL
GLUCOSE BLD-MCNC: 91 MG/DL (ref 70–99)
HCT VFR BLD CALC: 39.6 % (ref 36–48)
HEMOGLOBIN: 12.9 G/DL (ref 12–16)
LYMPHOCYTES ABSOLUTE: 2 K/UL (ref 1–5.1)
LYMPHOCYTES RELATIVE PERCENT: 21.6 %
MCH RBC QN AUTO: 27.1 PG (ref 26–34)
MCHC RBC AUTO-ENTMCNC: 32.4 G/DL (ref 31–36)
MCV RBC AUTO: 83.6 FL (ref 80–100)
MONOCYTES ABSOLUTE: 0.7 K/UL (ref 0–1.3)
MONOCYTES RELATIVE PERCENT: 7 %
NEUTROPHILS ABSOLUTE: 6.5 K/UL (ref 1.7–7.7)
NEUTROPHILS RELATIVE PERCENT: 69.8 %
PDW BLD-RTO: 14.4 % (ref 12.4–15.4)
PLATELET # BLD: 248 K/UL (ref 135–450)
PMV BLD AUTO: 10.1 FL (ref 5–10.5)
POTASSIUM SERPL-SCNC: 3.6 MMOL/L (ref 3.5–5.1)
RBC # BLD: 4.74 M/UL (ref 4–5.2)
SODIUM BLD-SCNC: 144 MMOL/L (ref 136–145)
TOTAL PROTEIN: 7.4 G/DL (ref 6.4–8.2)
WBC # BLD: 9.3 K/UL (ref 4–11)

## 2018-09-11 PROCEDURE — 1090F PRES/ABSN URINE INCON ASSESS: CPT | Performed by: INTERNAL MEDICINE

## 2018-09-11 PROCEDURE — 3017F COLORECTAL CA SCREEN DOC REV: CPT | Performed by: INTERNAL MEDICINE

## 2018-09-11 PROCEDURE — 1036F TOBACCO NON-USER: CPT | Performed by: INTERNAL MEDICINE

## 2018-09-11 PROCEDURE — G8399 PT W/DXA RESULTS DOCUMENT: HCPCS | Performed by: INTERNAL MEDICINE

## 2018-09-11 PROCEDURE — 99214 OFFICE O/P EST MOD 30 MIN: CPT | Performed by: INTERNAL MEDICINE

## 2018-09-11 PROCEDURE — G8427 DOCREV CUR MEDS BY ELIG CLIN: HCPCS | Performed by: INTERNAL MEDICINE

## 2018-09-11 PROCEDURE — 4040F PNEUMOC VAC/ADMIN/RCVD: CPT | Performed by: INTERNAL MEDICINE

## 2018-09-11 PROCEDURE — 1123F ACP DISCUSS/DSCN MKR DOCD: CPT | Performed by: INTERNAL MEDICINE

## 2018-09-11 PROCEDURE — G8417 CALC BMI ABV UP PARAM F/U: HCPCS | Performed by: INTERNAL MEDICINE

## 2018-09-11 PROCEDURE — 1101F PT FALLS ASSESS-DOCD LE1/YR: CPT | Performed by: INTERNAL MEDICINE

## 2018-09-11 RX ORDER — CLINDAMYCIN HYDROCHLORIDE 150 MG/1
CAPSULE ORAL
Qty: 80 CAPSULE | Refills: 0 | Status: SHIPPED | OUTPATIENT
Start: 2018-09-11 | End: 2018-09-18 | Stop reason: ALTCHOICE

## 2018-09-11 RX ORDER — LACTOBACILLUS RHAMNOSUS GG 10B CELL
1 CAPSULE ORAL DAILY
Qty: 30 CAPSULE | Refills: 0 | COMMUNITY
Start: 2018-09-11 | End: 2018-09-27 | Stop reason: ALTCHOICE

## 2018-09-11 ASSESSMENT — ENCOUNTER SYMPTOMS
SHORTNESS OF BREATH: 1
DIARRHEA: 0
CONSTIPATION: 0
WHEEZING: 0

## 2018-09-11 NOTE — PROGRESS NOTES
9/11/2018    This is a 77 y.o. female   Chief Complaint   Patient presents with    Other     f/u CT and US   . Here for follow up. Has very swollen right leg. Has pain in the right leg, not worse. About the same   She has questions  About the ly node on the left getting bx instead of the ly node on the right. I told her dr Nikolai Padilla and I discussed this. Had cervical polyp, negative for dysplasia. Has colonoscopy sept 20ths. HPI    Review of Systems   Constitutional: Negative for chills and fever. Respiratory: Positive for shortness of breath (walking into here). Negative for wheezing. Gastrointestinal: Negative for constipation and diarrhea. Neurological: Negative for dizziness and light-headedness. Past Medical History:   Diagnosis Date    COPD (chronic obstructive pulmonary disease) (HCC)     Glaucoma     Hypertension        Prior to Visit Medications    Medication Sig Taking? Authorizing Provider   apixaban (ELIQUIS) 5 MG TABS tablet Take 1 tablet by mouth 2 times daily Yes Antonio Hernandez MD   albuterol sulfate HFA (PROAIR HFA) 108 (90 Base) MCG/ACT inhaler Inhale 2 puffs into the lungs every 6 hours as needed for Wheezing  Patient taking differently: Inhale 2 puffs into the lungs every 6 hours as needed for Wheezing  Yes Antonio Hernandez MD   furosemide (LASIX) 20 MG tablet Take 1 tablet by mouth daily Yes Antonio Hernandez MD   nystatin (MYCOSTATIN) 930024 UNIT/GM powder Apply 3 times daily.  Yes Antonio Hernandez MD   amLODIPine (NORVASC) 10 MG tablet Take 1 tablet by mouth daily Yes Antonio Hernandez MD   Respiratory Therapy Supplies (NEBULIZER/TUBING/MOUTHPIECE) KIT 1 kit by Does not apply route daily Yes Ana M Champion MD   ipratropium-albuterol (DUONEB) 0.5-2.5 (3) MG/3ML SOLN nebulizer solution Inhale 3 mLs into the lungs every 4 hours Yes Ana M Champion MD   fluticasone-salmeterol (ADVAIR DISKUS) 250-50 MCG/DOSE AEPB INHALE ONE PUFF INTO THE LUNGS

## 2018-09-12 ENCOUNTER — HOSPITAL ENCOUNTER (OUTPATIENT)
Dept: VASCULAR LAB | Age: 68
Discharge: HOME | End: 2018-09-12
Attending: INTERNAL MEDICINE | Admitting: INTERNAL MEDICINE

## 2018-09-12 ENCOUNTER — TELEPHONE (OUTPATIENT)
Dept: FAMILY MEDICINE CLINIC | Age: 68
End: 2018-09-12

## 2018-09-12 DIAGNOSIS — R60.0 LOCALIZED EDEMA: ICD-10-CM

## 2018-09-13 ENCOUNTER — TELEPHONE (OUTPATIENT)
Dept: FAMILY MEDICINE CLINIC | Age: 68
End: 2018-09-13

## 2018-09-13 NOTE — TELEPHONE ENCOUNTER
----- Message from Jasper Snider MD sent at 9/12/2018  3:03 PM EDT -----  Please inform pt that her ultrasound did not show any blood clot which is good news. No change in meds needed for now, follow up with her specialist appointment that is coming up. Thanks.

## 2018-09-14 LAB
HPV COMMENT: ABNORMAL
HPV TYPE 16: NOT DETECTED
HPV TYPE 18: NOT DETECTED
HPVOH (OTHER TYPES): DETECTED

## 2018-09-18 ENCOUNTER — TELEPHONE (OUTPATIENT)
Dept: FAMILY MEDICINE CLINIC | Age: 68
End: 2018-09-18

## 2018-09-18 ENCOUNTER — OFFICE VISIT (OUTPATIENT)
Dept: FAMILY MEDICINE CLINIC | Age: 68
End: 2018-09-18

## 2018-09-18 ENCOUNTER — PAT TELEPHONE (OUTPATIENT)
Dept: PREADMISSION TESTING | Age: 68
End: 2018-09-18

## 2018-09-18 VITALS
RESPIRATION RATE: 14 BRPM | HEART RATE: 84 BPM | WEIGHT: 156 LBS | OXYGEN SATURATION: 98 % | BODY MASS INDEX: 31.45 KG/M2 | DIASTOLIC BLOOD PRESSURE: 88 MMHG | HEIGHT: 59 IN | SYSTOLIC BLOOD PRESSURE: 152 MMHG

## 2018-09-18 VITALS — WEIGHT: 156 LBS | HEIGHT: 59 IN | BODY MASS INDEX: 31.45 KG/M2

## 2018-09-18 DIAGNOSIS — I25.10 CORONARY ARTERY CALCIFICATION: ICD-10-CM

## 2018-09-18 DIAGNOSIS — R59.9 ENLARGED LYMPH NODE: ICD-10-CM

## 2018-09-18 DIAGNOSIS — J43.2 CENTRILOBULAR EMPHYSEMA (HCC): ICD-10-CM

## 2018-09-18 DIAGNOSIS — R59.0 INGUINAL ADENOPATHY: Primary | ICD-10-CM

## 2018-09-18 DIAGNOSIS — I25.84 CORONARY ARTERY CALCIFICATION: ICD-10-CM

## 2018-09-18 DIAGNOSIS — R60.0 LEG EDEMA, RIGHT: ICD-10-CM

## 2018-09-18 PROCEDURE — 99214 OFFICE O/P EST MOD 30 MIN: CPT | Performed by: INTERNAL MEDICINE

## 2018-09-18 PROCEDURE — G8417 CALC BMI ABV UP PARAM F/U: HCPCS | Performed by: INTERNAL MEDICINE

## 2018-09-18 PROCEDURE — 1090F PRES/ABSN URINE INCON ASSESS: CPT | Performed by: INTERNAL MEDICINE

## 2018-09-18 PROCEDURE — G8926 SPIRO NO PERF OR DOC: HCPCS | Performed by: INTERNAL MEDICINE

## 2018-09-18 PROCEDURE — G8427 DOCREV CUR MEDS BY ELIG CLIN: HCPCS | Performed by: INTERNAL MEDICINE

## 2018-09-18 PROCEDURE — 4040F PNEUMOC VAC/ADMIN/RCVD: CPT | Performed by: INTERNAL MEDICINE

## 2018-09-18 PROCEDURE — 1123F ACP DISCUSS/DSCN MKR DOCD: CPT | Performed by: INTERNAL MEDICINE

## 2018-09-18 PROCEDURE — G8399 PT W/DXA RESULTS DOCUMENT: HCPCS | Performed by: INTERNAL MEDICINE

## 2018-09-18 PROCEDURE — G8598 ASA/ANTIPLAT THER USED: HCPCS | Performed by: INTERNAL MEDICINE

## 2018-09-18 PROCEDURE — 3017F COLORECTAL CA SCREEN DOC REV: CPT | Performed by: INTERNAL MEDICINE

## 2018-09-18 PROCEDURE — 1036F TOBACCO NON-USER: CPT | Performed by: INTERNAL MEDICINE

## 2018-09-18 PROCEDURE — 93000 ELECTROCARDIOGRAM COMPLETE: CPT | Performed by: INTERNAL MEDICINE

## 2018-09-18 PROCEDURE — 1101F PT FALLS ASSESS-DOCD LE1/YR: CPT | Performed by: INTERNAL MEDICINE

## 2018-09-18 PROCEDURE — 3023F SPIROM DOC REV: CPT | Performed by: INTERNAL MEDICINE

## 2018-09-18 ASSESSMENT — ENCOUNTER SYMPTOMS
CONSTIPATION: 0
WHEEZING: 0
DIARRHEA: 0
SHORTNESS OF BREATH: 0

## 2018-09-18 NOTE — PROGRESS NOTES
them.     If you have a living will and a durable power of  for healthcare, please bring in a copy. As part of our patient safety program to minimize surgical site infections, we ask you to do the following:    · Please notify your surgeon if you develop any illness between         now and the  day of your surgery. · This includes a cough, cold, fever, sore throat, nausea,         or vomiting, and diarrhea, etc.  ·  Please notify your surgeon if you experience dizziness, shortness         of breath or blurred vision between now and the time of your surgery. You may shower the night before surgery or the morning of   your surgery with an antibacterial soap. You will need to bring a photo ID and insurance card    [facility] has an onsite pharmacy, would you like to utilize our pharmacy     If you will be staying overnight and use a C-pap machine, please bring   your C-pap to hospital     Our goal is to provide you with excellent care, therefore, visitors will be limited to two(2) in the room at a time so that we may focus on providing this care for you. Please contact pre-admission testing if you have any further questions. [facility] phone number:  [phone]  Please note these are generalized instructions for all surgical cases, you may be provided with more specific instructions according to your surgery.

## 2018-09-18 NOTE — TELEPHONE ENCOUNTER
[facility]  the pt is going  to be seeing Dr. Jennifer Dailey. Please advise,      Thanks.

## 2018-09-18 NOTE — PROGRESS NOTES
9/18/2018    This is a 77 y.o. female   Chief Complaint   Patient presents with    Leg Swelling     f/u   .saw oncology dr Katina Louis and she referred pt to surgery for excisional bx. Of ly node  Pt did not know who she was referred to  Has a card in the mail to see someone oct 4- but does not know who this is  Right leg is\"  awful\"  Not keep leg elevated a lot    Has an appt this week for a colonoscopy    No fever or chills   no nighstweats. HPI    Review of Systems   Constitutional: Negative for chills and fever. Respiratory: Negative for shortness of breath and wheezing. Cardiovascular: Negative for chest pain and palpitations. Gastrointestinal: Negative for constipation and diarrhea. Neurological: Negative for dizziness and light-headedness. Past Medical History:   Diagnosis Date    COPD (chronic obstructive pulmonary disease) (HCC)     Glaucoma     Hypertension        Prior to Visit Medications    Medication Sig Taking? Authorizing Provider   lactobacillus (CULTURELLE) capsule Take 1 capsule by mouth daily Yes Demi Jane MD   apixaban (ELIQUIS) 5 MG TABS tablet Take 1 tablet by mouth 2 times daily Yes Demi Jane MD   albuterol sulfate HFA (PROAIR HFA) 108 (90 Base) MCG/ACT inhaler Inhale 2 puffs into the lungs every 6 hours as needed for Wheezing  Patient taking differently: Inhale 2 puffs into the lungs every 6 hours as needed for Wheezing  Yes Demi Jane MD   furosemide (LASIX) 20 MG tablet Take 1 tablet by mouth daily Yes Demi Jane MD   nystatin (MYCOSTATIN) [id number] UNIT/GM powder Apply 3 times daily.  Yes Demi Jane MD   amLODIPine (NORVASC) 10 MG tablet Take 1 tablet by mouth daily Yes Demi Jane MD   Respiratory Therapy Supplies (NEBULIZER/TUBING/MOUTHPIECE) KIT 1 kit by Does not apply route daily Yes Linda Downing MD   ipratropium-albuterol (DUONEB) 0.5-2.5 (3) MG/3ML SOLN nebulizer solution Inhale 3 mLs into the lungs every 4 hours

## 2018-09-18 NOTE — PATIENT INSTRUCTIONS
Foam wedge to put under the couch cushion to elevate the pad. Keep legs elevated as much as possible. Wrap right leg with ace wrap daily. You need to schedule a ct scan abd/pelvis    You need a stress test because of the coronary calcification.     Today schedule appt with dr Fritz Faria    [address]

## 2018-09-20 ENCOUNTER — HOSPITAL ENCOUNTER (OUTPATIENT)
Dept: ENDOSCOPY | Age: 68
Discharge: HOME | End: 2018-09-20
Attending: INTERNAL MEDICINE | Admitting: INTERNAL MEDICINE

## 2018-09-20 VITALS
RESPIRATION RATE: 14 BRPM | DIASTOLIC BLOOD PRESSURE: 73 MMHG | WEIGHT: 153 LBS | BODY MASS INDEX: 30.84 KG/M2 | HEART RATE: 82 BPM | SYSTOLIC BLOOD PRESSURE: 101 MMHG | HEIGHT: 59 IN | OXYGEN SATURATION: 99 % | TEMPERATURE: 99.2 F

## 2018-09-20 DIAGNOSIS — I82.401 ACUTE EMBOLISM AND THROMBOSIS OF DEEP VEIN OF RIGHT LOWER EXTREMITY (HCC): ICD-10-CM

## 2018-09-20 PROBLEM — Z98.890 S/P COLONOSCOPY: Status: ACTIVE | Noted: 2018-09-20

## 2018-09-20 RX ORDER — SODIUM CHLORIDE 9 MG/ML
INJECTION, SOLUTION INTRAVENOUS CONTINUOUS
Status: DISCONTINUED | OUTPATIENT
Start: 2018-09-20 | End: 2018-09-21 | Stop reason: HOSPADM

## 2018-09-20 RX ORDER — SODIUM CHLORIDE 0.9 % (FLUSH) 0.9 %
10 SYRINGE (ML) INJECTION EVERY 12 HOURS SCHEDULED
Status: DISCONTINUED | OUTPATIENT
Start: 2018-09-20 | End: 2018-09-21 | Stop reason: HOSPADM

## 2018-09-20 RX ORDER — SODIUM CHLORIDE 0.9 % (FLUSH) 0.9 %
10 SYRINGE (ML) INJECTION PRN
Status: DISCONTINUED | OUTPATIENT
Start: 2018-09-20 | End: 2018-09-21 | Stop reason: HOSPADM

## 2018-09-20 RX ORDER — ONDANSETRON 2 MG/ML
4 INJECTION INTRAMUSCULAR; INTRAVENOUS
Status: ACTIVE | OUTPATIENT
Start: 2018-09-20 | End: 2018-09-20

## 2018-09-20 RX ADMIN — SODIUM CHLORIDE: 9 INJECTION, SOLUTION INTRAVENOUS at 08:01

## 2018-09-20 ASSESSMENT — PAIN SCALES - GENERAL: PAINLEVEL_OUTOF10: 0

## 2018-09-20 ASSESSMENT — PAIN - FUNCTIONAL ASSESSMENT: PAIN_FUNCTIONAL_ASSESSMENT: 0-10

## 2018-09-20 ASSESSMENT — LIFESTYLE VARIABLES: SMOKING_STATUS: 0

## 2018-09-20 ASSESSMENT — ENCOUNTER SYMPTOMS: SHORTNESS OF BREATH: 0

## 2018-09-20 NOTE — H&P
years ago. She has a 5.50 pack-year smoking history. She has never used smokeless tobacco.  ETOH:   reports that she does not drink alcohol. DRUGS:   reports that she does not use drugs. PHYSICAL EXAM:      Vital Signs: LMP  (LMP Unknown)    Airway: No stridor or wheezing noted. Good air movement  Pulmonary: without wheezes. Clear to auscultation  Cardiac:regular rate and rhythm without loud murmurs  Abdomen:soft, nontender,  Bowel sounds present    Pre-Procedure Assessment / Plan:  1) EGD/Colonscopy    ASA Grade:  ASA 3 - Patient with moderate systemic disease with functional limitations  Mallampati Classification:  Class II    Level of Sedation Plan:Deep sedation    Post Procedure plan: Return to same level of care    I assessed the patient and find that the patient is in satisfactory condition to proceed with the planned procedure and sedation plan. I have explained the risk, benefits, and alternatives to the procedure; the patient understands and agrees to proceed.        Annamarie Ivy MD  9/20/2018

## 2018-09-20 NOTE — OP NOTE
scope was completely withdrawn. A digital rectal examination was performed and revealed negative without mass, lesions or tenderness. The Olympus video colonoscope was placed in the patient's rectum under digital direction and advanced to the cecum. The cecum was identified by characteristic anatomy and ballottment. The prep was excellent. The ileocecal valve was identified. The terminal ileum was normal.     The scope was then withdrawn back through the cecum, ascending, transverse, descending, sigmoid colon, and rectum. Careful circumferential examination of the mucosa in these areas demonstrated:    1. A 8 mm polyp in the rectosigmoid colon removed completely with cold snare polypectomy. The scope was then withdrawn into the rectum and retroflexed. The retroflexed view of the anal verge and rectum demonstrates normal rectum. The scope was straightened, the colon was decompressed and the scope was withdrawn from the patient. The patient tolerated the procedure well and was taken to the PACU in good condition. Impression:    1) See post procedure diagnoses    Recommendations:   1. Await pathology results. 2. Repeat Colonoscopy in 5-10 years pending pathology results. If polyp is an adenoma recommend repeat Colonoscopy in 5 years for surveillance purposes. 3. The patient had biopsies taken today. The patient should call for results in 7 days if they have not heard from our office. Our number is [phone].     Annamarie Ivy MD  [address]

## 2018-09-20 NOTE — ANESTHESIA PRE-OP
Comment: counseled on tobacco exposure avoidance    Alcohol use No      Comment: Socially     Medications  Current Outpatient Prescriptions on File Prior to Encounter   Medication Sig Dispense Refill    lactobacillus (CULTURELLE) capsule Take 1 capsule by mouth daily 30 capsule 0    albuterol sulfate HFA (PROAIR HFA) 108 (90 Base) MCG/ACT inhaler Inhale 2 puffs into the lungs every 6 hours as needed for Wheezing (Patient taking differently: Inhale 2 puffs into the lungs every 6 hours as needed for Wheezing ) 1 Inhaler 3    furosemide (LASIX) 20 MG tablet Take 1 tablet by mouth daily 90 tablet 1    amLODIPine (NORVASC) 10 MG tablet Take 1 tablet by mouth daily 30 tablet 3    ipratropium-albuterol (DUONEB) 0.5-2.5 (3) MG/3ML SOLN nebulizer solution Inhale 3 mLs into the lungs every 4 hours 360 mL 5    fluticasone-salmeterol (ADVAIR DISKUS) 250-50 MCG/DOSE AEPB INHALE ONE PUFF INTO THE LUNGS EVERY 12 HOURS 3 Inhaler 1    nystatin (MYCOSTATIN) 574844 UNIT/GM powder Apply 3 times daily.  1 Bottle 0    Respiratory Therapy Supplies (NEBULIZER/TUBING/MOUTHPIECE) KIT 1 kit by Does not apply route daily 1 kit 0     Current Facility-Administered Medications on File Prior to Encounter   Medication Dose Route Frequency Provider Last Rate Last Dose    albuterol (PROVENTIL) nebulizer solution 2.5 mg  2.5 mg Nebulization Once Romina Aguila MD         Current Outpatient Prescriptions   Medication Sig Dispense Refill    lactobacillus (CULTURELLE) capsule Take 1 capsule by mouth daily 30 capsule 0    albuterol sulfate HFA (PROAIR HFA) 108 (90 Base) MCG/ACT inhaler Inhale 2 puffs into the lungs every 6 hours as needed for Wheezing (Patient taking differently: Inhale 2 puffs into the lungs every 6 hours as needed for Wheezing ) 1 Inhaler 3    furosemide (LASIX) 20 MG tablet Take 1 tablet by mouth daily 90 tablet 1    amLODIPine (NORVASC) 10 MG tablet Take 1 tablet by mouth daily 30 tablet 3    ipratropium-albuterol (DUONEB) 0.5-2.5 (3) MG/3ML SOLN nebulizer solution Inhale 3 mLs into the lungs every 4 hours 360 mL 5    fluticasone-salmeterol (ADVAIR DISKUS) 250-50 MCG/DOSE AEPB INHALE ONE PUFF INTO THE LUNGS EVERY 12 HOURS 3 Inhaler 1    nystatin (MYCOSTATIN) 360426 UNIT/GM powder Apply 3 times daily. 1 Bottle 0    Respiratory Therapy Supplies (NEBULIZER/TUBING/MOUTHPIECE) KIT 1 kit by Does not apply route daily 1 kit 0     Current Facility-Administered Medications   Medication Dose Route Frequency Provider Last Rate Last Dose    0.9 % sodium chloride infusion   Intravenous Continuous Rey Stokes MD        sodium chloride flush 0.9 % injection 10 mL  10 mL Intravenous 2 times per day Rey Stokes MD        sodium chloride flush 0.9 % injection 10 mL  10 mL Intravenous PRN Rey Stokes MD        albuterol (PROVENTIL) nebulizer solution 2.5 mg  2.5 mg Nebulization Once Berny Anderson MD         Vital Signs (Current)   Vitals:    18 0750   BP: 126/64   Pulse: 116   Resp: 16   Temp: 97 °F (36.1 °C)   SpO2: 95%     Vital Signs Statistics (for past 48 hrs)     Temp  Av °F (36.1 °C)  Min: 97 °F (36.1 °C)   Min taken time: 18 0750  Max: 97 °F (36.1 °C)   Max taken time: 18 0750  Pulse  Av  Min: 116   Min taken time: 18 0750  Max: 116   Max taken time: 18 0750  Resp  Av  Min: 12   Min taken time: 18 0750  Max: 12   Max taken time: 18 0750  BP  Min: 126/64   Min taken time: 18 0750  Max: 126/64   Max taken time: 18 0750  SpO2  Av %  Min: 95 %   Min taken time: 18 0750  Max: 95 %   Max taken time: 18 0750    BP Readings from Last 3 Encounters:   18 126/64   18 (!) 152/88   18 124/78     BMI  Body mass index is 30.9 kg/m². Estimated body mass index is 30.9 kg/m² as calculated from the following:    Height as of this encounter: 4' 11\" (1.499 m).     Weight as of this encounter: 153 lb (69.4

## 2018-09-26 ENCOUNTER — INITIAL CONSULT (OUTPATIENT)
Dept: SURGERY | Age: 68
End: 2018-09-26
Payer: MEDICARE

## 2018-09-26 VITALS
SYSTOLIC BLOOD PRESSURE: 138 MMHG | BODY MASS INDEX: 31.45 KG/M2 | HEIGHT: 59 IN | HEART RATE: 98 BPM | DIASTOLIC BLOOD PRESSURE: 77 MMHG | WEIGHT: 156 LBS

## 2018-09-26 DIAGNOSIS — R59.0 INGUINAL ADENOPATHY: Primary | ICD-10-CM

## 2018-09-26 DIAGNOSIS — R60.0 EDEMA OF RIGHT LOWER EXTREMITY: ICD-10-CM

## 2018-09-26 PROCEDURE — 3017F COLORECTAL CA SCREEN DOC REV: CPT | Performed by: SURGERY

## 2018-09-26 PROCEDURE — 1090F PRES/ABSN URINE INCON ASSESS: CPT | Performed by: SURGERY

## 2018-09-26 PROCEDURE — G8427 DOCREV CUR MEDS BY ELIG CLIN: HCPCS | Performed by: SURGERY

## 2018-09-26 PROCEDURE — 99203 OFFICE O/P NEW LOW 30 MIN: CPT | Performed by: SURGERY

## 2018-09-26 PROCEDURE — G8417 CALC BMI ABV UP PARAM F/U: HCPCS | Performed by: SURGERY

## 2018-09-26 PROCEDURE — 1101F PT FALLS ASSESS-DOCD LE1/YR: CPT | Performed by: SURGERY

## 2018-09-26 ASSESSMENT — ENCOUNTER SYMPTOMS
WHEEZING: 0
RESPIRATORY NEGATIVE: 1
SHORTNESS OF BREATH: 0
GASTROINTESTINAL NEGATIVE: 1

## 2018-09-26 NOTE — LETTER
SURGERY CONSENT  PT: Tay Tripp   : 1950  I the undersigned hereby authorize Dr. Dominique Rodriguez or his/her associate and whomever he/she may designate as his/her assistant, to administer such surgical treatment as is necessary, and to perform the following surgical procedure (s):    SURGERY: Excision and biopsy of right groin lymph node     And such additional surgical procedures as may be considered therapeutically necessary on the basis of findings during the course of the above surgical procedures. I consent to the administration of anesthesia or sedation as is necessary. I understand that the risks and hazards inherent to anesthesia or sedation will be explained to me by my anesthesiologist or his/her designate. I further consent to and authorize transfusion of blood or blood derivatives deemed necessary by the physician involved in the surgical procedure. I understand that there is a risk that such transfusion may cause viral hepatitis or other blood borne disease or adverse reactions. I acknowledge that these risks as well as the benefits and alternatives of the this treatment have been explained to me and that no express or implied warranty is given by Dr. Noelle Mcneil as to the blood or blood components. I acknowledge that surgical procedures in general have the risks and hazards of bleeding, infection, and injury to the surrounding structures, including internal organs, blood clots and other risks and hazards inherent to this particular surgical procedure. The benefits relative to the above risks, as well as unforeseen risks, have been discussed and I would like to proceed with the surgical procedure outlined above. I further acknowledge that no guarantee, promise or assurance has been made to me as to the results that may be obtained with this surgical procedure.       Physicians other than the operating practitioner, including but not

## 2018-09-26 NOTE — PROGRESS NOTES
Dose Route Frequency Provider Last Rate Last Dose    albuterol (PROVENTIL) nebulizer solution 2.5 mg  2.5 mg Nebulization Once Romina Aguila MD           Allergies   Allergen Reactions    Sulfa Antibiotics Rash       Social History     Social History    Marital status: Single     Spouse name: N/A    Number of children: 2    Years of education: N/A     Occupational History    retired       retired     Social History Main Topics    Smoking status: Former Smoker     Packs/day: 0.50     Years: 11.00     Types: Cigarettes     Start date: 1/1/1966     Quit date: 8/3/2015    Smokeless tobacco: Never Used      Comment: counseled on tobacco exposure avoidance    Alcohol use No      Comment: Socially    Drug use: No    Sexual activity: No     Other Topics Concern    Not on file     Social History Narrative    No narrative on file       Family History   Problem Relation Age of Onset    Other Father         Tuberculosis in his 34\"s in [address] [place] Diabetes Mother     Coronary Art Dis Mother     Diabetes Sister     High Blood Pressure Sister     Coronary Art Dis Sister            Review of Systems   Constitutional: Negative. Negative for chills, diaphoresis, fever and unexpected weight change. Respiratory: Negative. Negative for shortness of breath and wheezing. Cardiovascular: Positive for leg swelling. Negative for chest pain. Gastrointestinal: Negative. Musculoskeletal: Negative. Skin: Negative. Hematological: Negative. All other systems reviewed and are negative. Objective:   Physical Exam   Constitutional: She is oriented to person, place, and time. She appears well-developed and well-nourished. No distress. HENT:   Head: Normocephalic and atraumatic. Right Ear: External ear normal.   Left Ear: External ear normal.   Nose: Nose normal.   Mouth/Throat: Oropharynx is clear and moist.   Eyes: Conjunctivae are normal. No scleral icterus. Neck: Normal range of motion.

## 2018-09-27 RX ORDER — COVID-19 ANTIGEN TEST
1 KIT MISCELLANEOUS PRN
COMMUNITY
End: 2021-09-08

## 2018-09-27 NOTE — PROGRESS NOTES
make-up or nail polish on your fingers or toes      For your safety, please do not wear any jewelry or body piercing's on the day of surgery. All jewelry must be removed. If you have dentures, they will be removed before going to operating room. For your convenience, we will provide you with a container. If you wear contact lenses or glasses, they will be removed, please bring a case for them. If you have a living will and a durable power of  for healthcare, please bring in a copy. As part of our patient safety program to minimize surgical site infections, we ask you to do the following:    · Please notify your surgeon if you develop any illness between         now and the  day of your surgery. · This includes a cough, cold, fever, sore throat, nausea,         or vomiting, and diarrhea, etc.  ·  Please notify your surgeon if you experience dizziness, shortness         of breath or blurred vision between now and the time of your surgery. Do not shave your operative site 96 hours prior to surgery. For face and neck surgery, men may use an electric razor 48 hours   prior to surgery. You may shower the night before surgery or the morning of   your surgery with an antibacterial soap. You will need to bring a photo ID and insurance card    [facility] has an onsite pharmacy, would you like to utilize our pharmacy     If you will be staying overnight and use a C-pap machine, please bring   your C-pap to hospital     Our goal is to provide you with excellent care, therefore, visitors will be limited to two(2) in the room at a time so that we may focus on providing this care for you. Please contact pre-admission testing if you have any further questions.                  [facility] phone number:  [phone] PAT fax number:  [phone]  Please note these are generalized instructions for all surgical cases, you may be provided with more specific instructions according to your surgery.

## 2018-09-28 ENCOUNTER — OFFICE VISIT (OUTPATIENT)
Dept: GYNECOLOGY | Age: 68
End: 2018-09-28
Payer: MEDICARE

## 2018-09-28 ENCOUNTER — ANESTHESIA EVENT (OUTPATIENT)
Dept: OPERATING ROOM | Age: 68
End: 2018-09-28
Payer: MEDICARE

## 2018-09-28 VITALS
HEART RATE: 96 BPM | TEMPERATURE: 97.5 F | HEIGHT: 59 IN | SYSTOLIC BLOOD PRESSURE: 143 MMHG | BODY MASS INDEX: 31.57 KG/M2 | DIASTOLIC BLOOD PRESSURE: 74 MMHG | WEIGHT: 156.6 LBS

## 2018-09-28 DIAGNOSIS — R87.613 HGSIL (HIGH GRADE SQUAMOUS INTRAEPITHELIAL LESION) ON PAP SMEAR OF CERVIX: Primary | ICD-10-CM

## 2018-09-28 PROCEDURE — 57454 BX/CURETT OF CERVIX W/SCOPE: CPT | Performed by: OBSTETRICS & GYNECOLOGY

## 2018-10-01 ENCOUNTER — HOSPITAL ENCOUNTER (OUTPATIENT)
Age: 68
Setting detail: OUTPATIENT SURGERY
Discharge: HOME | End: 2018-10-01
Attending: SURGERY | Admitting: SURGERY
Payer: MEDICARE

## 2018-10-01 ENCOUNTER — ANESTHESIA (OUTPATIENT)
Dept: OPERATING ROOM | Age: 68
End: 2018-10-01
Payer: MEDICARE

## 2018-10-01 VITALS
SYSTOLIC BLOOD PRESSURE: 130 MMHG | HEIGHT: 59 IN | HEART RATE: 81 BPM | BODY MASS INDEX: 31.18 KG/M2 | TEMPERATURE: 97.9 F | DIASTOLIC BLOOD PRESSURE: 55 MMHG | WEIGHT: 154.65 LBS | OXYGEN SATURATION: 99 % | RESPIRATION RATE: 18 BRPM

## 2018-10-01 VITALS — SYSTOLIC BLOOD PRESSURE: 96 MMHG | OXYGEN SATURATION: 100 % | DIASTOLIC BLOOD PRESSURE: 53 MMHG

## 2018-10-01 DIAGNOSIS — R59.0 INGUINAL ADENOPATHY: ICD-10-CM

## 2018-10-01 PROCEDURE — 88305 TISSUE EXAM BY PATHOLOGIST: CPT

## 2018-10-01 PROCEDURE — 7100000000 HC PACU RECOVERY - FIRST 15 MIN: Performed by: SURGERY

## 2018-10-01 PROCEDURE — 3700000000 HC ANESTHESIA ATTENDED CARE: Performed by: SURGERY

## 2018-10-01 PROCEDURE — 7100000001 HC PACU RECOVERY - ADDTL 15 MIN: Performed by: SURGERY

## 2018-10-01 PROCEDURE — 2580000003 HC RX 258: Performed by: ANESTHESIOLOGY

## 2018-10-01 PROCEDURE — 88185 FLOWCYTOMETRY/TC ADD-ON: CPT

## 2018-10-01 PROCEDURE — 7100000010 HC PHASE II RECOVERY - FIRST 15 MIN: Performed by: SURGERY

## 2018-10-01 PROCEDURE — 2580000003 HC RX 258: Performed by: SURGERY

## 2018-10-01 PROCEDURE — 7100000011 HC PHASE II RECOVERY - ADDTL 15 MIN: Performed by: SURGERY

## 2018-10-01 PROCEDURE — 2709999900 HC NON-CHARGEABLE SUPPLY: Performed by: SURGERY

## 2018-10-01 PROCEDURE — 3600000015 HC SURGERY LEVEL 5 ADDTL 15MIN: Performed by: SURGERY

## 2018-10-01 PROCEDURE — 6360000002 HC RX W HCPCS: Performed by: NURSE ANESTHETIST, CERTIFIED REGISTERED

## 2018-10-01 PROCEDURE — 6360000002 HC RX W HCPCS: Performed by: SURGERY

## 2018-10-01 PROCEDURE — 2500000003 HC RX 250 WO HCPCS: Performed by: SURGERY

## 2018-10-01 PROCEDURE — 3700000001 HC ADD 15 MINUTES (ANESTHESIA): Performed by: SURGERY

## 2018-10-01 PROCEDURE — 3600000005 HC SURGERY LEVEL 5 BASE: Performed by: SURGERY

## 2018-10-01 PROCEDURE — 38765 REMOVE GROIN LYMPH NODES: CPT | Performed by: SURGERY

## 2018-10-01 PROCEDURE — 88184 FLOWCYTOMETRY/ TC 1 MARKER: CPT

## 2018-10-01 PROCEDURE — 2500000003 HC RX 250 WO HCPCS: Performed by: NURSE ANESTHETIST, CERTIFIED REGISTERED

## 2018-10-01 RX ORDER — SODIUM CHLORIDE 0.9 % (FLUSH) 0.9 %
10 SYRINGE (ML) INJECTION PRN
Status: DISCONTINUED | OUTPATIENT
Start: 2018-10-01 | End: 2018-10-01 | Stop reason: HOSPADM

## 2018-10-01 RX ORDER — OXYCODONE HYDROCHLORIDE AND ACETAMINOPHEN 5; 325 MG/1; MG/1
2 TABLET ORAL PRN
Status: DISCONTINUED | OUTPATIENT
Start: 2018-10-01 | End: 2018-10-01 | Stop reason: HOSPADM

## 2018-10-01 RX ORDER — SODIUM CHLORIDE 9 MG/ML
INJECTION, SOLUTION INTRAVENOUS CONTINUOUS
Status: DISCONTINUED | OUTPATIENT
Start: 2018-10-01 | End: 2018-10-01 | Stop reason: HOSPADM

## 2018-10-01 RX ORDER — ONDANSETRON 2 MG/ML
4 INJECTION INTRAMUSCULAR; INTRAVENOUS
Status: DISCONTINUED | OUTPATIENT
Start: 2018-10-01 | End: 2018-10-01 | Stop reason: HOSPADM

## 2018-10-01 RX ORDER — LIDOCAINE HYDROCHLORIDE AND EPINEPHRINE 10; 10 MG/ML; UG/ML
INJECTION, SOLUTION INFILTRATION; PERINEURAL
Status: COMPLETED | OUTPATIENT
Start: 2018-10-01 | End: 2018-10-01

## 2018-10-01 RX ORDER — HYDROCODONE BITARTRATE AND ACETAMINOPHEN 5; 325 MG/1; MG/1
1 TABLET ORAL EVERY 6 HOURS PRN
Qty: 20 TABLET | Refills: 0 | Status: SHIPPED | OUTPATIENT
Start: 2018-10-01 | End: 2018-10-06

## 2018-10-01 RX ORDER — FENTANYL CITRATE 50 UG/ML
25 INJECTION, SOLUTION INTRAMUSCULAR; INTRAVENOUS EVERY 5 MIN PRN
Status: DISCONTINUED | OUTPATIENT
Start: 2018-10-01 | End: 2018-10-01 | Stop reason: HOSPADM

## 2018-10-01 RX ORDER — MAGNESIUM HYDROXIDE 1200 MG/15ML
LIQUID ORAL CONTINUOUS PRN
Status: DISCONTINUED | OUTPATIENT
Start: 2018-10-01 | End: 2018-10-01 | Stop reason: HOSPADM

## 2018-10-01 RX ORDER — MIDAZOLAM HYDROCHLORIDE 1 MG/ML
INJECTION INTRAMUSCULAR; INTRAVENOUS PRN
Status: DISCONTINUED | OUTPATIENT
Start: 2018-10-01 | End: 2018-10-01 | Stop reason: SDUPTHER

## 2018-10-01 RX ORDER — PROPOFOL 10 MG/ML
INJECTION, EMULSION INTRAVENOUS CONTINUOUS PRN
Status: DISCONTINUED | OUTPATIENT
Start: 2018-10-01 | End: 2018-10-01 | Stop reason: SDUPTHER

## 2018-10-01 RX ORDER — OXYCODONE HYDROCHLORIDE AND ACETAMINOPHEN 5; 325 MG/1; MG/1
1 TABLET ORAL PRN
Status: DISCONTINUED | OUTPATIENT
Start: 2018-10-01 | End: 2018-10-01 | Stop reason: HOSPADM

## 2018-10-01 RX ORDER — SODIUM CHLORIDE 0.9 % (FLUSH) 0.9 %
10 SYRINGE (ML) INJECTION EVERY 12 HOURS SCHEDULED
Status: DISCONTINUED | OUTPATIENT
Start: 2018-10-01 | End: 2018-10-01 | Stop reason: HOSPADM

## 2018-10-01 RX ORDER — ONDANSETRON 2 MG/ML
INJECTION INTRAMUSCULAR; INTRAVENOUS PRN
Status: DISCONTINUED | OUTPATIENT
Start: 2018-10-01 | End: 2018-10-01 | Stop reason: SDUPTHER

## 2018-10-01 RX ORDER — DEXAMETHASONE SODIUM PHOSPHATE 4 MG/ML
INJECTION, SOLUTION INTRA-ARTICULAR; INTRALESIONAL; INTRAMUSCULAR; INTRAVENOUS; SOFT TISSUE PRN
Status: DISCONTINUED | OUTPATIENT
Start: 2018-10-01 | End: 2018-10-01 | Stop reason: SDUPTHER

## 2018-10-01 RX ORDER — LIDOCAINE HYDROCHLORIDE 20 MG/ML
INJECTION, SOLUTION INFILTRATION; PERINEURAL PRN
Status: DISCONTINUED | OUTPATIENT
Start: 2018-10-01 | End: 2018-10-01 | Stop reason: SDUPTHER

## 2018-10-01 RX ORDER — FENTANYL CITRATE 50 UG/ML
INJECTION, SOLUTION INTRAMUSCULAR; INTRAVENOUS PRN
Status: DISCONTINUED | OUTPATIENT
Start: 2018-10-01 | End: 2018-10-01 | Stop reason: SDUPTHER

## 2018-10-01 RX ADMIN — ONDANSETRON 4 MG: 2 INJECTION INTRAMUSCULAR; INTRAVENOUS at 07:40

## 2018-10-01 RX ADMIN — FENTANYL CITRATE 25 MCG: 50 INJECTION INTRAMUSCULAR; INTRAVENOUS at 08:00

## 2018-10-01 RX ADMIN — FENTANYL CITRATE 25 MCG: 50 INJECTION INTRAMUSCULAR; INTRAVENOUS at 07:48

## 2018-10-01 RX ADMIN — LIDOCAINE HYDROCHLORIDE 60 MG: 20 INJECTION, SOLUTION INFILTRATION; PERINEURAL at 07:38

## 2018-10-01 RX ADMIN — SODIUM CHLORIDE: 9 INJECTION, SOLUTION INTRAVENOUS at 06:46

## 2018-10-01 RX ADMIN — Medication 2 G: at 07:26

## 2018-10-01 RX ADMIN — DEXAMETHASONE SODIUM PHOSPHATE 4 MG: 4 INJECTION, SOLUTION INTRAMUSCULAR; INTRAVENOUS at 07:40

## 2018-10-01 RX ADMIN — PROPOFOL 100 MCG/KG/MIN: 10 INJECTION, EMULSION INTRAVENOUS at 07:38

## 2018-10-01 RX ADMIN — FENTANYL CITRATE 50 MCG: 50 INJECTION INTRAMUSCULAR; INTRAVENOUS at 07:38

## 2018-10-01 RX ADMIN — MIDAZOLAM 2 MG: 1 INJECTION INTRAMUSCULAR; INTRAVENOUS at 07:30

## 2018-10-01 ASSESSMENT — PULMONARY FUNCTION TESTS
PIF_VALUE: 0
PIF_VALUE: 1
PIF_VALUE: 0
PIF_VALUE: 1
PIF_VALUE: 0

## 2018-10-01 ASSESSMENT — PAIN SCALES - WONG BAKER
WONGBAKER_NUMERICALRESPONSE: 0

## 2018-10-01 ASSESSMENT — LIFESTYLE VARIABLES: SMOKING_STATUS: 0

## 2018-10-01 ASSESSMENT — PAIN - FUNCTIONAL ASSESSMENT: PAIN_FUNCTIONAL_ASSESSMENT: 0-10

## 2018-10-01 ASSESSMENT — PAIN DESCRIPTION - PAIN TYPE: TYPE: SURGICAL PAIN

## 2018-10-01 NOTE — ANESTHESIA PRE PROCEDURE
Department of Anesthesiology  Preprocedure Note       Name:  Indra Clement   Age:  77 y.o.  :  1950                                          MRN:  [id number]         Date:  10/1/2018      Surgeon: Jluis Villatoro):  Raine Ball MD    Procedure: Procedure(s):  EXCISIONAL BRIOPSY OF RIGHT GROIN LYMPH NODE    Medications prior to admission:   Prior to Admission medications    Medication Sig Start Date End Date Taking?  Authorizing Provider   Naproxen Sodium (ALEVE) 220 MG CAPS Take 1 tablet by mouth as needed for Pain   Yes Historical Provider, MD   albuterol sulfate HFA (PROAIR HFA) 108 (90 Base) MCG/ACT inhaler Inhale 2 puffs into the lungs every 6 hours as needed for Wheezing  Patient taking differently: Inhale 2 puffs into the lungs every 6 hours as needed for Wheezing  18  Yes Ja Miller MD   furosemide (LASIX) 20 MG tablet Take 1 tablet by mouth daily 18  Yes Ja Miller MD   amLODIPine (NORVASC) 10 MG tablet Take 1 tablet by mouth daily 18  Yes Ja Miller MD   ipratropium-albuterol (DUONEB) 0.5-2.5 (3) MG/3ML SOLN nebulizer solution Inhale 3 mLs into the lungs every 4 hours  Patient taking differently: Inhale 1 vial into the lungs every 4 hours as needed  18  Yes Declan Alfonso MD   fluticasone-salmeterol (ADVAIR DISKUS) 250-50 MCG/DOSE AEPB INHALE ONE PUFF INTO THE LUNGS EVERY 12 HOURS 16  Yes Terry Solano MD   Respiratory Therapy Supplies (NEBULIZER/TUBING/MOUTHPIECE) KIT 1 kit by Does not apply route daily 18   Declan Alfonso MD       Current medications:    Current Facility-Administered Medications   Medication Dose Route Frequency Provider Last Rate Last Dose    0.9 % sodium chloride infusion   Intravenous Continuous Andrea Gay  mL/hr at 10/01/18 0646      sodium chloride flush 0.9 % injection 10 mL  10 mL Intravenous 2 times per day Andrea Gay MD        sodium chloride flush 0.9 % injection 10 mL  10 mL Intravenous PRN Spencer Hicks Fiorella Galvez MD        ceFAZolin (ANCEF) 2 g in dextrose 5 % 100 mL IVPB  2 g Intravenous Once Tiffanie Briseno MD           Allergies:     Allergies   Allergen Reactions    Sulfa Antibiotics Rash       Problem List:    Patient Active Problem List   Diagnosis Code    COPD (chronic obstructive pulmonary disease)--pfts show severe obst defect w severe decrease dlco  +bronchodilator response 6/14--repeat unchanged J44.9    Tobacco abuse-pt quit 7/15--cold turkey Z72.0    Pulmonary nodule, left- on lung cancer screening program-  pet scan 6/16=unchanged billy nodule-little activity lung--pos activoity ge jct (still need to cont puln nod surv x 2 yrs) R91.1    Dependent edema--echo 10/15--nl ef--mild diast dysf--advised low salt diet/ stockings/ wt loss R60.9    Essential hypertension H56    Diastolic dysfunction--mild on echo 10/15--nl ef I51.9    Pustular psoriasis, 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    Respiratory failure ([address] J96.90    Acute respiratory failure with hypoxemia (HCC) J96.01    COPD exacerbation (HCC) J44.1    Centrilobular emphysema (HCC) J43.2    Mediastinal adenopathy R59.0    Lung nodule R91.1    SIRS (systemic inflammatory response syndrome) (HCC) R65.10    Leukocytosis D72.829    Ex-smoker Z87.891    Tobacco abuse counseling Z71.6    Elevated brain natriuretic peptide (BNP) level R79.89    Enlarged lymph node enlarged ing ly node 2018 R59.9    Inguinal adenopathy R59.0    Edema of right lower extremity aug/sept  2018 R60.0    Coronary artery calcification I25.10, I25.84    S/P colonoscopy 9/18 [place] - 8mm polyp Z98.890       Past Medical History:        Diagnosis Date    COPD (chronic obstructive pulmonary disease) (HCC)     Glaucoma     Hx of blood clots     small-rigjht leg    Hypertension        Past Surgical History:        Procedure Laterality Date    COLONOSCOPY  09/20/2018    Dr. Sammie Thornton       Social History:    Social History Substance Use Topics    Smoking status: Former Smoker     Packs/day: 0.50     Years: 11.00     Types: Cigarettes     Start date: 1/1/1966     Quit date: 8/3/2015    Smokeless tobacco: Never Used      Comment: counseled on tobacco exposure avoidance    Alcohol use No      Comment: Socially                                Counseling given: Not Answered      Vital Signs (Current):   Vitals:    09/27/18 0959 10/01/18 0631   BP:  (!) 148/70   Pulse:  107   Resp:  16   Temp:  98.6 °F (37 °C)   TempSrc:  Temporal   SpO2:  97%   Weight: 156 lb (70.8 kg) 154 lb 10.4 oz (70.1 kg)   Height: 4' 11\" (1.499 m) 4' 11\" (1.499 m)                                              BP Readings from Last 3 Encounters:   10/01/18 (!) 148/70   09/28/18 (!) 143/74   09/26/18 138/77       NPO Status: Time of last liquid consumption: 2200                        Time of last solid consumption: 1700                        Date of last liquid consumption: 09/30/18                        Date of last solid food consumption: 09/30/18    BMI:   Wt Readings from Last 3 Encounters:   10/01/18 154 lb 10.4 oz (70.1 kg)   09/28/18 156 lb 9.6 oz (71 kg)   09/26/18 156 lb (70.8 kg)     Body mass index is 31.24 kg/m².     CBC:   Lab Results   Component Value Date    WBC 9.3 09/11/2018    RBC 4.74 09/11/2018    HGB 12.9 09/11/2018    HCT 39.6 09/11/2018    MCV 83.6 09/11/2018    RDW 14.4 09/11/2018     09/11/2018       CMP:   Lab Results   Component Value Date     09/11/2018    K 3.6 09/11/2018     09/11/2018    CO2 26 09/11/2018    BUN 11 09/11/2018    CREATININE 0.8 09/11/2018    GFRAA >60 09/11/2018    AGRATIO 1.6 09/11/2018    LABGLOM >60 09/11/2018    GLUCOSE 91 09/11/2018    PROT 7.4 09/11/2018    CALCIUM 9.6 09/11/2018    BILITOT 0.3 09/11/2018    ALKPHOS 110 09/11/2018    AST 28 09/11/2018    ALT 26 09/11/2018       POC Tests: No results for input(s): POCGLU, POCNA, POCK, POCCL, POCBUN, POCHEMO, POCHCT in the last 72

## 2018-10-01 NOTE — BRIEF OP NOTE
Brief Postoperative Note    Scot Tripp  YOB: 1950  [id number]    Pre-operative Diagnosis: R Groin adenopathy    Post-operative Diagnosis: Same    Procedure: excision R groin LNs    Anesthesia: MAC    Surgeons/Assistants: Patricia    Estimated Blood Loss: less than 50     Complications: None    Specimens: Was Obtained: LN x2    Findings: small, matted LN    Electronically signed by Roge Olvera MD on 10/1/2018 at 8:17 AM

## 2018-10-02 ENCOUNTER — TELEPHONE (OUTPATIENT)
Dept: GYNECOLOGY | Age: 68
End: 2018-10-02

## 2018-10-05 ENCOUNTER — OFFICE VISIT (OUTPATIENT)
Dept: GYNECOLOGY | Age: 68
End: 2018-10-05
Payer: MEDICARE

## 2018-10-05 VITALS
BODY MASS INDEX: 31.45 KG/M2 | HEIGHT: 59 IN | DIASTOLIC BLOOD PRESSURE: 78 MMHG | WEIGHT: 156 LBS | SYSTOLIC BLOOD PRESSURE: 132 MMHG

## 2018-10-05 DIAGNOSIS — R87.613 HGSIL (HIGH GRADE SQUAMOUS INTRAEPITHELIAL LESION) ON PAP SMEAR OF CERVIX: Primary | ICD-10-CM

## 2018-10-05 PROCEDURE — 1101F PT FALLS ASSESS-DOCD LE1/YR: CPT | Performed by: OBSTETRICS & GYNECOLOGY

## 2018-10-05 PROCEDURE — 1123F ACP DISCUSS/DSCN MKR DOCD: CPT | Performed by: OBSTETRICS & GYNECOLOGY

## 2018-10-05 PROCEDURE — 1036F TOBACCO NON-USER: CPT | Performed by: OBSTETRICS & GYNECOLOGY

## 2018-10-05 PROCEDURE — G8417 CALC BMI ABV UP PARAM F/U: HCPCS | Performed by: OBSTETRICS & GYNECOLOGY

## 2018-10-05 PROCEDURE — 3017F COLORECTAL CA SCREEN DOC REV: CPT | Performed by: OBSTETRICS & GYNECOLOGY

## 2018-10-05 PROCEDURE — G8598 ASA/ANTIPLAT THER USED: HCPCS | Performed by: OBSTETRICS & GYNECOLOGY

## 2018-10-05 PROCEDURE — 99213 OFFICE O/P EST LOW 20 MIN: CPT | Performed by: OBSTETRICS & GYNECOLOGY

## 2018-10-05 PROCEDURE — 4040F PNEUMOC VAC/ADMIN/RCVD: CPT | Performed by: OBSTETRICS & GYNECOLOGY

## 2018-10-05 PROCEDURE — G8399 PT W/DXA RESULTS DOCUMENT: HCPCS | Performed by: OBSTETRICS & GYNECOLOGY

## 2018-10-05 PROCEDURE — 1090F PRES/ABSN URINE INCON ASSESS: CPT | Performed by: OBSTETRICS & GYNECOLOGY

## 2018-10-05 PROCEDURE — G8484 FLU IMMUNIZE NO ADMIN: HCPCS | Performed by: OBSTETRICS & GYNECOLOGY

## 2018-10-05 PROCEDURE — G8427 DOCREV CUR MEDS BY ELIG CLIN: HCPCS | Performed by: OBSTETRICS & GYNECOLOGY

## 2018-10-09 NOTE — PROGRESS NOTES
Oral, resp. rate 16, height 4' 11\" (1.499 m), weight 155 lb (70.3 kg), SpO2 97 %, not currently breastfeeding.'  Gen: No distress. Resp: No accessory muscle use. No crackles. No  wheezes. no rhonchi. CV: Regular rate. Regular rhythm. No murmur or rub. No edema. Skin: Warm, dry, normal texture and turgor. No nodule on exposed extremities. Lymph: No cervical LAD. No supraclavicular LAD. M/S: No cyanosis. No clubbing. No joint deformity. Neuro: Moves all four extremities. Psych: Oriented x 3. No anxiety. Awake. Alert. Intact judgement and insight.     Current Outpatient Prescriptions   Medication Sig Dispense Refill    Naproxen Sodium (ALEVE) 220 MG CAPS Take 1 tablet by mouth as needed for Pain      albuterol sulfate HFA (PROAIR HFA) 108 (90 Base) MCG/ACT inhaler Inhale 2 puffs into the lungs every 6 hours as needed for Wheezing (Patient taking differently: Inhale 2 puffs into the lungs every 6 hours as needed for Wheezing ) 1 Inhaler 3    furosemide (LASIX) 20 MG tablet Take 1 tablet by mouth daily 90 tablet 1    amLODIPine (NORVASC) 10 MG tablet Take 1 tablet by mouth daily 30 tablet 3    Respiratory Therapy Supplies (NEBULIZER/TUBING/MOUTHPIECE) KIT 1 kit by Does not apply route daily 1 kit 0    ipratropium-albuterol (DUONEB) 0.5-2.5 (3) MG/3ML SOLN nebulizer solution Inhale 3 mLs into the lungs every 4 hours (Patient taking differently: Inhale 1 vial into the lungs every 4 hours as needed ) 360 mL 5    fluticasone-salmeterol (ADVAIR DISKUS) 250-50 MCG/DOSE AEPB INHALE ONE PUFF INTO THE LUNGS EVERY 12 HOURS 3 Inhaler 1     Current Facility-Administered Medications   Medication Dose Route Frequency Provider Last Rate Last Dose    albuterol (PROVENTIL) nebulizer solution 2.5 mg  2.5 mg Nebulization Once Ailyn Tovar MD           Data Reviewed:   CBC and Renal reviewed  Last CBC  Lab Results   Component Value Date    WBC 9.3 09/11/2018    RBC 4.74 09/11/2018    HGB 12.9 09/11/2018    MCV 83.6 09/11/2018     09/11/2018     Last Renal  Lab Results   Component Value Date     09/11/2018    K 3.6 09/11/2018     09/11/2018    CO2 26 09/11/2018    CO2 26 08/30/2018    CO2 29 05/23/2018    BUN 11 09/11/2018    CREATININE 0.8 09/11/2018    GLUCOSE 91 09/11/2018    CALCIUM 9.6 09/11/2018       Last ABG  POC Blood Gas: No results found for: POCPH, POCPCO2, POCPO2, POCHCO3, NBEA, YHJD6PCS  No results for input(s): PH, PCO2, PO2, HCO3, BE, O2SAT in the last 72 hours. Radiology Review:  Pertinent images / reports were reviewed as a part of this visit. CT Chest w/ contrast: No results found for this or any previous visit. CT Chest w/o contrast:   Results for orders placed during the hospital encounter of 04/10/18   CT Chest WO Contrast    Narrative EXAMINATION:  CT OF THE CHEST WITHOUT CONTRAST 4/10/2018 11:24 am    TECHNIQUE:  CT of the chest was performed without the administration of intravenous  contrast. Multiplanar reformatted images are provided for review. Dose  modulation, iterative reconstruction, and/or weight based adjustment of the  mA/kV was utilized to reduce the radiation dose to as low as reasonably  achievable. COMPARISON:  Chest CT 01/06/2018. PET-CT 06/14/2016. Chest CT 09/03/2015. HISTORY:  ORDERING PHYSICIAN PROVIDED HISTORY: Mediastinal adenopathy  TECHNOLOGIST PROVIDED HISTORY:  Technologist Provided Reason for Exam: f/u adenopathy  Acuity: Acute  Type of Encounter: Subsequent/Follow-up    FINDINGS:  Mediastinum:    *There is no significant hilar or mediastinal adenopathy. 6 mm lymph node is  seen anterior to the right of the lower trachea, image 32 decreased from the  prior study 1.1 cm. Subcarinal lymph node 13 mm decreased from 2.4 cm. There is some limitation without intravenous contrast.  *No significant cardiac enlargement or pericardial effusion.   *Vasculature is unremarkable for age on noncontrast

## 2018-10-10 ENCOUNTER — OFFICE VISIT (OUTPATIENT)
Dept: PULMONOLOGY | Age: 68
End: 2018-10-10
Payer: MEDICARE

## 2018-10-10 VITALS
RESPIRATION RATE: 16 BRPM | HEART RATE: 82 BPM | SYSTOLIC BLOOD PRESSURE: 138 MMHG | BODY MASS INDEX: 31.25 KG/M2 | OXYGEN SATURATION: 97 % | DIASTOLIC BLOOD PRESSURE: 70 MMHG | TEMPERATURE: 98 F | HEIGHT: 59 IN | WEIGHT: 155 LBS

## 2018-10-10 DIAGNOSIS — J43.2 CENTRILOBULAR EMPHYSEMA (HCC): ICD-10-CM

## 2018-10-10 DIAGNOSIS — R91.1 LUNG NODULE: ICD-10-CM

## 2018-10-10 PROCEDURE — G8399 PT W/DXA RESULTS DOCUMENT: HCPCS | Performed by: INTERNAL MEDICINE

## 2018-10-10 PROCEDURE — 3017F COLORECTAL CA SCREEN DOC REV: CPT | Performed by: INTERNAL MEDICINE

## 2018-10-10 PROCEDURE — G8926 SPIRO NO PERF OR DOC: HCPCS | Performed by: INTERNAL MEDICINE

## 2018-10-10 PROCEDURE — 1090F PRES/ABSN URINE INCON ASSESS: CPT | Performed by: INTERNAL MEDICINE

## 2018-10-10 PROCEDURE — G8598 ASA/ANTIPLAT THER USED: HCPCS | Performed by: INTERNAL MEDICINE

## 2018-10-10 PROCEDURE — 1036F TOBACCO NON-USER: CPT | Performed by: INTERNAL MEDICINE

## 2018-10-10 PROCEDURE — G8484 FLU IMMUNIZE NO ADMIN: HCPCS | Performed by: INTERNAL MEDICINE

## 2018-10-10 PROCEDURE — 3023F SPIROM DOC REV: CPT | Performed by: INTERNAL MEDICINE

## 2018-10-10 PROCEDURE — 99213 OFFICE O/P EST LOW 20 MIN: CPT | Performed by: INTERNAL MEDICINE

## 2018-10-10 PROCEDURE — G8427 DOCREV CUR MEDS BY ELIG CLIN: HCPCS | Performed by: INTERNAL MEDICINE

## 2018-10-10 PROCEDURE — 4040F PNEUMOC VAC/ADMIN/RCVD: CPT | Performed by: INTERNAL MEDICINE

## 2018-10-10 PROCEDURE — 1101F PT FALLS ASSESS-DOCD LE1/YR: CPT | Performed by: INTERNAL MEDICINE

## 2018-10-10 PROCEDURE — 1123F ACP DISCUSS/DSCN MKR DOCD: CPT | Performed by: INTERNAL MEDICINE

## 2018-10-10 PROCEDURE — G8417 CALC BMI ABV UP PARAM F/U: HCPCS | Performed by: INTERNAL MEDICINE

## 2018-10-10 NOTE — ASSESSMENT & PLAN NOTE
Continue to do very well with Advair 250. We discussed increasing dose. She does not want to this. Continue Advair with albuterol as needed. Has nebulizer at home.

## 2018-10-17 ENCOUNTER — OFFICE VISIT (OUTPATIENT)
Dept: SURGERY | Age: 68
End: 2018-10-17

## 2018-10-17 VITALS
DIASTOLIC BLOOD PRESSURE: 83 MMHG | HEIGHT: 59 IN | WEIGHT: 155 LBS | HEART RATE: 103 BPM | SYSTOLIC BLOOD PRESSURE: 150 MMHG | BODY MASS INDEX: 31.25 KG/M2

## 2018-10-17 DIAGNOSIS — R59.0 INGUINAL ADENOPATHY: Primary | ICD-10-CM

## 2018-10-17 PROCEDURE — 99024 POSTOP FOLLOW-UP VISIT: CPT | Performed by: SURGERY

## 2018-10-19 ENCOUNTER — TELEPHONE (OUTPATIENT)
Dept: GYNECOLOGY | Age: 68
End: 2018-10-19

## 2018-10-23 ENCOUNTER — CARE COORDINATION (OUTPATIENT)
Dept: CARE COORDINATION | Age: 68
End: 2018-10-23

## 2018-10-23 NOTE — CARE COORDINATION
Ambulatory Care Coordination Note  10/23/2018  CM Risk Score: 5  Anshul Mortality Risk Score: 4.37    ACC: Kimmy Rangel RN    Summary Note:     PLAN:  Will send the following information for review:  COPD zones/when to notify physician  COPD Exacerbation Plan: Care Instructions [facility]  Learning About COPD and Upper Respiratory Infections [facility]))      FYI:  Reviewed upcoming procedure (LEEP); patient denies any questions/concerns  Reviewed definitions of COPD  Reviewed COPD zones    General Assessment    Do you have any symptoms that are causing concern?:  No         COPD Assessment    Does the patient understand envrionmental exposure?:  Yes  Does the patient have a nebulizer?:  Yes     No patient-reported symptoms         Symptoms:             Ambulatory Care Coordination Assessment    Care Coordination Protocol  Program Enrollment:  Rising Risk  Referral from Primary Care Provider:  No  Week 1 - Initial Assessment     Do you have all of your prescriptions and are they filled?:  Yes (Comment: rarely forgets water pill if doing errands; otherwise takes medications)  Barriers to medication adherence:  Forgets to take     Do you have Home O2 Therapy?:  No      Ability to seek help/take action for Emergent Urgent situations i.e. fire, crime, inclement weather or health crisis. :  Independent  Ability to ambulate to restroom:  Independent  Ability handle personal hygeine needs (bathing/dressing/grooming): Independent  Ability to manage Medications: Independent  Ability to prepare Food Preparation:  Independent  Ability to maintain home (clean home, laundry): Independent  Ability to drive and/or has transportation:  Independent  Ability to do shopping:  Independent  Ability to manage finances:   Independent  Is patient able to live independently?:  Yes     Current Housing:  Apartment        Per the Fall Risk Screening, did the patient have 2 or more falls or 1 fall with injury in the past year?:  No Adequate communication, with or without minor barriers   Do other services need to be involved to help this patient?:  Other care/services not required at this time   Suggested Interventions and [employer] or Interventions:  COPD   Medi Set or Pill Pack:  Declined   Zone Management Tools: In Process                  Prior to Admission medications    Medication Sig Start Date End Date Taking?  Authorizing Provider   Naproxen Sodium (ALEVE) 220 MG CAPS Take 1 tablet by mouth as needed for Pain   Yes Historical Provider, MD   albuterol sulfate HFA (PROAIR HFA) 108 (90 Base) MCG/ACT inhaler Inhale 2 puffs into the lungs every 6 hours as needed for Wheezing  Patient taking differently: Inhale 2 puffs into the lungs every 6 hours as needed for Wheezing  7/18/18  Yes Sapna Abdalla MD   furosemide (LASIX) 20 MG tablet Take 1 tablet by mouth daily 6/20/18  Yes Sapna Abdalla MD   amLODIPine (NORVASC) 10 MG tablet Take 1 tablet by mouth daily 6/20/18  Yes Sapna Abdalla MD   Respiratory Therapy Supplies (NEBULIZER/TUBING/MOUTHPIECE) KIT 1 kit by Does not apply route daily 1/4/18  Yes Rusty Caruso MD   ipratropium-albuterol (DUONEB) 0.5-2.5 (3) MG/3ML SOLN nebulizer solution Inhale 3 mLs into the lungs every 4 hours  Patient taking differently: Inhale 1 vial into the lungs every 4 hours as needed  1/4/18  Yes Rusty Caruso MD   fluticasone-salmeterol (ADVAIR DISKUS) 250-50 MCG/DOSE AEPB INHALE ONE PUFF INTO THE LUNGS EVERY 12 HOURS 11/28/16  Yes Melva Araujo MD       Future Appointments  Date Time Provider Gloria Peñaloza   4/10/2019 10:40 AM Rusty Caruso  [facility] [place]

## 2018-10-24 ENCOUNTER — ANESTHESIA EVENT (OUTPATIENT)
Dept: OPERATING ROOM | Age: 68
End: 2018-10-24
Payer: MEDICARE

## 2018-10-25 ENCOUNTER — HOSPITAL ENCOUNTER (OUTPATIENT)
Age: 68
Setting detail: OUTPATIENT SURGERY
Discharge: HOME | End: 2018-10-25
Attending: OBSTETRICS & GYNECOLOGY | Admitting: OBSTETRICS & GYNECOLOGY
Payer: MEDICARE

## 2018-10-25 ENCOUNTER — ANESTHESIA (OUTPATIENT)
Dept: OPERATING ROOM | Age: 68
End: 2018-10-25
Payer: MEDICARE

## 2018-10-25 VITALS
RESPIRATION RATE: 11 BRPM | OXYGEN SATURATION: 100 % | SYSTOLIC BLOOD PRESSURE: 81 MMHG | DIASTOLIC BLOOD PRESSURE: 46 MMHG

## 2018-10-25 VITALS
HEART RATE: 74 BPM | SYSTOLIC BLOOD PRESSURE: 136 MMHG | TEMPERATURE: 98 F | OXYGEN SATURATION: 98 % | BODY MASS INDEX: 31.42 KG/M2 | WEIGHT: 155.87 LBS | HEIGHT: 59 IN | RESPIRATION RATE: 14 BRPM | DIASTOLIC BLOOD PRESSURE: 86 MMHG

## 2018-10-25 DIAGNOSIS — R87.613 HGSIL (HIGH GRADE SQUAMOUS INTRAEPITHELIAL LESION) ON PAP SMEAR OF CERVIX: Primary | ICD-10-CM

## 2018-10-25 PROCEDURE — 7100000011 HC PHASE II RECOVERY - ADDTL 15 MIN: Performed by: OBSTETRICS & GYNECOLOGY

## 2018-10-25 PROCEDURE — 3600000015 HC SURGERY LEVEL 5 ADDTL 15MIN: Performed by: OBSTETRICS & GYNECOLOGY

## 2018-10-25 PROCEDURE — 2709999900 HC NON-CHARGEABLE SUPPLY: Performed by: OBSTETRICS & GYNECOLOGY

## 2018-10-25 PROCEDURE — 3700000001 HC ADD 15 MINUTES (ANESTHESIA): Performed by: OBSTETRICS & GYNECOLOGY

## 2018-10-25 PROCEDURE — S0028 INJECTION, FAMOTIDINE, 20 MG: HCPCS | Performed by: ANESTHESIOLOGY

## 2018-10-25 PROCEDURE — 2500000003 HC RX 250 WO HCPCS: Performed by: OBSTETRICS & GYNECOLOGY

## 2018-10-25 PROCEDURE — 88305 TISSUE EXAM BY PATHOLOGIST: CPT

## 2018-10-25 PROCEDURE — 6370000000 HC RX 637 (ALT 250 FOR IP)

## 2018-10-25 PROCEDURE — 2500000003 HC RX 250 WO HCPCS

## 2018-10-25 PROCEDURE — 88307 TISSUE EXAM BY PATHOLOGIST: CPT

## 2018-10-25 PROCEDURE — 6370000000 HC RX 637 (ALT 250 FOR IP): Performed by: OBSTETRICS & GYNECOLOGY

## 2018-10-25 PROCEDURE — 3700000000 HC ANESTHESIA ATTENDED CARE: Performed by: OBSTETRICS & GYNECOLOGY

## 2018-10-25 PROCEDURE — 7100000010 HC PHASE II RECOVERY - FIRST 15 MIN: Performed by: OBSTETRICS & GYNECOLOGY

## 2018-10-25 PROCEDURE — 2500000003 HC RX 250 WO HCPCS: Performed by: ANESTHESIOLOGY

## 2018-10-25 PROCEDURE — 2580000003 HC RX 258: Performed by: ANESTHESIOLOGY

## 2018-10-25 PROCEDURE — 3600000005 HC SURGERY LEVEL 5 BASE: Performed by: OBSTETRICS & GYNECOLOGY

## 2018-10-25 PROCEDURE — 6360000002 HC RX W HCPCS

## 2018-10-25 PROCEDURE — 7100000001 HC PACU RECOVERY - ADDTL 15 MIN: Performed by: OBSTETRICS & GYNECOLOGY

## 2018-10-25 PROCEDURE — 57522 CONIZATION OF CERVIX: CPT | Performed by: OBSTETRICS & GYNECOLOGY

## 2018-10-25 PROCEDURE — 7100000000 HC PACU RECOVERY - FIRST 15 MIN: Performed by: OBSTETRICS & GYNECOLOGY

## 2018-10-25 RX ORDER — ONDANSETRON 2 MG/ML
4 INJECTION INTRAMUSCULAR; INTRAVENOUS
Status: DISCONTINUED | OUTPATIENT
Start: 2018-10-25 | End: 2018-10-25 | Stop reason: HOSPADM

## 2018-10-25 RX ORDER — FERRIC SUBSULFATE 0.21 G/G
LIQUID TOPICAL
Status: COMPLETED | OUTPATIENT
Start: 2018-10-25 | End: 2018-10-25

## 2018-10-25 RX ORDER — OXYCODONE HYDROCHLORIDE AND ACETAMINOPHEN 5; 325 MG/1; MG/1
1 TABLET ORAL EVERY 6 HOURS PRN
Qty: 28 TABLET | Refills: 0 | Status: SHIPPED | OUTPATIENT
Start: 2018-10-25 | End: 2018-11-01

## 2018-10-25 RX ORDER — FENTANYL CITRATE 50 UG/ML
INJECTION, SOLUTION INTRAMUSCULAR; INTRAVENOUS PRN
Status: DISCONTINUED | OUTPATIENT
Start: 2018-10-25 | End: 2018-10-25 | Stop reason: SDUPTHER

## 2018-10-25 RX ORDER — OXYCODONE HYDROCHLORIDE AND ACETAMINOPHEN 5; 325 MG/1; MG/1
1 TABLET ORAL PRN
Status: DISCONTINUED | OUTPATIENT
Start: 2018-10-25 | End: 2018-10-25 | Stop reason: HOSPADM

## 2018-10-25 RX ORDER — PROPOFOL 10 MG/ML
INJECTION, EMULSION INTRAVENOUS PRN
Status: DISCONTINUED | OUTPATIENT
Start: 2018-10-25 | End: 2018-10-25 | Stop reason: SDUPTHER

## 2018-10-25 RX ORDER — LIDOCAINE HYDROCHLORIDE AND EPINEPHRINE 10; 10 MG/ML; UG/ML
INJECTION, SOLUTION INFILTRATION; PERINEURAL
Status: COMPLETED | OUTPATIENT
Start: 2018-10-25 | End: 2018-10-25

## 2018-10-25 RX ORDER — FENTANYL CITRATE 50 UG/ML
25 INJECTION, SOLUTION INTRAMUSCULAR; INTRAVENOUS EVERY 5 MIN PRN
Status: DISCONTINUED | OUTPATIENT
Start: 2018-10-25 | End: 2018-10-25 | Stop reason: HOSPADM

## 2018-10-25 RX ORDER — DEXAMETHASONE SODIUM PHOSPHATE 4 MG/ML
INJECTION, SOLUTION INTRA-ARTICULAR; INTRALESIONAL; INTRAMUSCULAR; INTRAVENOUS; SOFT TISSUE PRN
Status: DISCONTINUED | OUTPATIENT
Start: 2018-10-25 | End: 2018-10-25 | Stop reason: SDUPTHER

## 2018-10-25 RX ORDER — SODIUM CHLORIDE 9 MG/ML
INJECTION, SOLUTION INTRAVENOUS CONTINUOUS
Status: DISCONTINUED | OUTPATIENT
Start: 2018-10-25 | End: 2018-10-25 | Stop reason: HOSPADM

## 2018-10-25 RX ORDER — SODIUM CHLORIDE 0.9 % (FLUSH) 0.9 %
10 SYRINGE (ML) INJECTION PRN
Status: DISCONTINUED | OUTPATIENT
Start: 2018-10-25 | End: 2018-10-25 | Stop reason: HOSPADM

## 2018-10-25 RX ORDER — SODIUM CHLORIDE 0.9 % (FLUSH) 0.9 %
10 SYRINGE (ML) INJECTION EVERY 12 HOURS SCHEDULED
Status: DISCONTINUED | OUTPATIENT
Start: 2018-10-25 | End: 2018-10-25 | Stop reason: HOSPADM

## 2018-10-25 RX ORDER — IODINE SOLUTION STRONG 5% (LUGOL'S) 5 %
SOLUTION ORAL
Status: COMPLETED | OUTPATIENT
Start: 2018-10-25 | End: 2018-10-25

## 2018-10-25 RX ORDER — OXYCODONE HYDROCHLORIDE AND ACETAMINOPHEN 5; 325 MG/1; MG/1
2 TABLET ORAL PRN
Status: DISCONTINUED | OUTPATIENT
Start: 2018-10-25 | End: 2018-10-25 | Stop reason: HOSPADM

## 2018-10-25 RX ORDER — ONDANSETRON 2 MG/ML
INJECTION INTRAMUSCULAR; INTRAVENOUS PRN
Status: DISCONTINUED | OUTPATIENT
Start: 2018-10-25 | End: 2018-10-25 | Stop reason: SDUPTHER

## 2018-10-25 RX ORDER — ALBUTEROL SULFATE 90 UG/1
AEROSOL, METERED RESPIRATORY (INHALATION) PRN
Status: DISCONTINUED | OUTPATIENT
Start: 2018-10-25 | End: 2018-10-25 | Stop reason: SDUPTHER

## 2018-10-25 RX ORDER — LIDOCAINE HYDROCHLORIDE 20 MG/ML
INJECTION, SOLUTION INFILTRATION; PERINEURAL PRN
Status: DISCONTINUED | OUTPATIENT
Start: 2018-10-25 | End: 2018-10-25 | Stop reason: SDUPTHER

## 2018-10-25 RX ADMIN — ONDANSETRON 4 MG: 2 INJECTION INTRAMUSCULAR; INTRAVENOUS at 08:37

## 2018-10-25 RX ADMIN — LIDOCAINE HYDROCHLORIDE 100 MG: 20 INJECTION, SOLUTION INFILTRATION; PERINEURAL at 08:32

## 2018-10-25 RX ADMIN — FAMOTIDINE 20 MG: 10 INJECTION, SOLUTION INTRAVENOUS at 07:36

## 2018-10-25 RX ADMIN — FENTANYL CITRATE 25 MCG: 50 INJECTION INTRAMUSCULAR; INTRAVENOUS at 08:56

## 2018-10-25 RX ADMIN — DEXAMETHASONE SODIUM PHOSPHATE 8 MG: 4 INJECTION, SOLUTION INTRAMUSCULAR; INTRAVENOUS at 08:37

## 2018-10-25 RX ADMIN — PROPOFOL 100 MG: 10 INJECTION, EMULSION INTRAVENOUS at 08:32

## 2018-10-25 RX ADMIN — SODIUM CHLORIDE: 9 INJECTION, SOLUTION INTRAVENOUS at 07:33

## 2018-10-25 RX ADMIN — FENTANYL CITRATE 50 MCG: 50 INJECTION INTRAMUSCULAR; INTRAVENOUS at 08:32

## 2018-10-25 RX ADMIN — ALBUTEROL SULFATE 6 PUFF: 90 AEROSOL, METERED RESPIRATORY (INHALATION) at 08:37

## 2018-10-25 ASSESSMENT — PULMONARY FUNCTION TESTS
PIF_VALUE: 15
PIF_VALUE: 15
PIF_VALUE: 8
PIF_VALUE: 3
PIF_VALUE: 3
PIF_VALUE: 14
PIF_VALUE: 15
PIF_VALUE: 0
PIF_VALUE: 14
PIF_VALUE: 2
PIF_VALUE: 0
PIF_VALUE: 14
PIF_VALUE: 3
PIF_VALUE: 1
PIF_VALUE: 14
PIF_VALUE: 15
PIF_VALUE: 2
PIF_VALUE: 14
PIF_VALUE: 3
PIF_VALUE: 15
PIF_VALUE: 14
PIF_VALUE: 15
PIF_VALUE: 15
PIF_VALUE: 14
PIF_VALUE: 0
PIF_VALUE: 14
PIF_VALUE: 14
PIF_VALUE: 3
PIF_VALUE: 15
PIF_VALUE: 1
PIF_VALUE: 15
PIF_VALUE: 15
PIF_VALUE: 0
PIF_VALUE: 3
PIF_VALUE: 14
PIF_VALUE: 15

## 2018-10-25 ASSESSMENT — PAIN SCALES - GENERAL
PAINLEVEL_OUTOF10: 0

## 2018-10-25 ASSESSMENT — PAIN - FUNCTIONAL ASSESSMENT: PAIN_FUNCTIONAL_ASSESSMENT: 0-10

## 2018-10-25 ASSESSMENT — LIFESTYLE VARIABLES: SMOKING_STATUS: 0

## 2018-10-25 NOTE — ANESTHESIA POSTPROCEDURE EVALUATION
Department of Anesthesiology  Postprocedure Note    Patient: Justus Garnica  MRN: [id number]  YOB: 1950  Date of evaluation: 10/25/2018  Time:  10:09 AM     Procedure Summary     Date:  10/25/18 Room / Location:  [facility] OR 02 / [facility] OR    Anesthesia Start:  0827 Anesthesia Stop:  7190    Procedure:  LOOP ELECTROSURGICAL EXCISION PROCEDURE (N/A ) Diagnosis:  (HGSIL HIGH GRADE INTRAEPITHELIAL LESION)    Surgeon:  Uriel Hilton MD Responsible Provider:  Robbie English MD    Anesthesia Type:  general ASA Status:  3          Anesthesia Type: general    Irving Phase I: Irving Score: 10    Irving Phase II: Irving Score: 10    Last vitals: Reviewed and per EMR flowsheets.        Anesthesia Post Evaluation    Patient location during evaluation: PACU  Patient participation: complete - patient participated  Level of consciousness: awake and alert  Airway patency: patent  Nausea & Vomiting: no nausea and no vomiting  Complications: no  Cardiovascular status: hemodynamically stable  Respiratory status: acceptable  Hydration status: stable

## 2018-10-25 NOTE — PROGRESS NOTES
PATIENT PRESENTED WITH RED RASH ON BILATERAL LOWER LEGS, PATIENT STATES \"IT IS ECZEMA\". SURGEON AWARE BILATERAL ATHROMBIC BOOTS APPLIED.
Pt continues to state no pain. Pt abd soft, non-distended. peripad and mesh panties clean dry intact. peripad has scant amount of serosanguinous yellow tinged drainage. Respirations easy, unlabored, chest expansion symmetrical on room air. Pt appears comfortable, talkative, awake, alert, and oriented.
Vaginal Sweep Documentation     Intraop skin prep sponge count correct, verified by Ruy Foster SA and SEEMA RODRÍGUEZ RN. Vaginal sweep performed by DR. ADAMSON at 557. No foreign objects or vaginal tears noted.
piercing's on the day of surgery. All jewelry must be removed. If you have dentures, they will be removed before going to operating room. For your convenience, we will provide you with a container. If you wear contact lenses or glasses, they will be removed, please bring a case for them. If you have a living will and a durable power of  for healthcare, please bring in a copy. As part of our patient safety program to minimize surgical site infections, we ask you to do the following:    · Please notify your surgeon if you develop any illness between         now and the  day of your surgery. · This includes a cough, cold, fever, sore throat, nausea,         or vomiting, and diarrhea, etc.  ·  Please notify your surgeon if you experience dizziness, shortness         of breath or blurred vision between now and the time of your surgery. Do not shave your operative site 96 hours prior to surgery. For face and neck surgery, men may use an electric razor 48 hours   prior to surgery. You may shower the night before surgery or the morning of   your surgery with an antibacterial soap. You will need to bring a photo ID and insurance card    [facility] has an onsite pharmacy, would you like to utilize our pharmacy     If you will be staying overnight and use a C-pap machine, please bring   your C-pap to hospital     Our goal is to provide you with excellent care, therefore, visitors will be limited to two(2) in the room at a time so that we may focus on providing this care for you. Please contact pre-admission testing if you have any further questions. [facility] phone number:  [phone] [facility] fax number:  [phone]  Please note these are generalized instructions for all surgical cases, you may be provided with more specific instructions according to your surgery.

## 2018-10-25 NOTE — ANESTHESIA PRE PROCEDURE
Department of Anesthesiology  Preprocedure Note       Name:  Tana Villaseñor   Age:  77 y.o.  :  1950                                          MRN:  [id number]         Date:  10/25/2018      Surgeon: Inder Goldsmith):  Velia Resendez MD    Procedure: Procedure(s):  LEEP    Medications prior to admission:   Prior to Admission medications    Medication Sig Start Date End Date Taking?  Authorizing Provider   Naproxen Sodium (ALEVE) 220 MG CAPS Take 1 tablet by mouth as needed for Pain   Yes Historical Provider, MD   albuterol sulfate HFA (PROAIR HFA) 108 (90 Base) MCG/ACT inhaler Inhale 2 puffs into the lungs every 6 hours as needed for Wheezing  Patient taking differently: Inhale 2 puffs into the lungs every 6 hours as needed for Wheezing  18  Yes Cher Bowman MD   furosemide (LASIX) 20 MG tablet Take 1 tablet by mouth daily 18  Yes hCer Bowman MD   amLODIPine (NORVASC) 10 MG tablet Take 1 tablet by mouth daily 18  Yes Cher Bowman MD   ipratropium-albuterol (DUONEB) 0.5-2.5 (3) MG/3ML SOLN nebulizer solution Inhale 3 mLs into the lungs every 4 hours  Patient taking differently: Inhale 1 vial into the lungs every 4 hours as needed  18  Yes Santiago Peraza MD   fluticasone-salmeterol (ADVAIR DISKUS) 250-50 MCG/DOSE AEPB INHALE ONE PUFF INTO THE LUNGS EVERY 12 HOURS 16  Yes Romulo Loya MD   Respiratory Therapy Supplies (NEBULIZER/TUBING/MOUTHPIECE) KIT 1 kit by Does not apply route daily 18   Santiago Peraza MD       Current medications:    Current Facility-Administered Medications   Medication Dose Route Frequency Provider Last Rate Last Dose    0.9 % sodium chloride infusion   Intravenous Continuous Sophie Escamilla MD        sodium chloride flush 0.9 % injection 10 mL  10 mL Intravenous 2 times per day Sophie Escamilla MD        sodium chloride flush 0.9 % injection 10 mL  10 mL Intravenous PRN Sophie Escamilla MD        famotidine (PEPCID) injection 20 mg  20 mg PREGTESTUR, PREGSERUM, HCG, HCGQUANT     ABGs: No results found for: PHART, PO2ART, UAM8WZD, TAP3DRC, BEART, T2QPCEOD     Type & Screen (If Applicable):  No results found for: KWESI [facility]    Anesthesia Evaluation  Patient summary reviewed no history of anesthetic complications:   Airway: Mallampati: II  TM distance: >3 FB   Neck ROM: full  Mouth opening: > = 3 FB Dental:          Pulmonary:   (+) COPD:  decreased breath sounds,      (-) recent URI, sleep apnea and not a current smoker                           Cardiovascular:    (+) hypertension:, CAD:,     (-) valvular problems/murmurs, past MI and murmur    ECG reviewed  Rhythm: regular  Rate: normal  Echocardiogram reviewed                  Neuro/Psych:      (-) seizures, neuromuscular disease, TIA, CVA and headaches           GI/Hepatic/Renal:        (-) GERD, PUD and hepatitis       Endo/Other:    (+) malignancy/cancer. (-) diabetes mellitus, hypothyroidism, hyperthyroidism               Abdominal:           Vascular:           ROS comment: lymphadenopathy. Anesthesia Plan      general     ASA 3       Induction: intravenous. Anesthetic plan and risks discussed with patient. Plan discussed with CRNA. This pre-anesthesia assessment may be used as a history and physical.    DOS STAFF ADDENDUM:    Pt seen and examined, chart reviewed (including anesthesia, drug and allergy history). No interval changes to history and physical examination. Anesthetic plan, risks, benefits, alternatives, and personnel involved discussed with patient. Patient verbalized an understanding and agrees to proceed.       Treasure Whiteside MD  October 25, 2018  7:21 AM    Treasure Whiteside MD   10/25/2018

## 2018-10-26 ENCOUNTER — CARE COORDINATION (OUTPATIENT)
Dept: CARE COORDINATION | Age: 68
End: 2018-10-26

## 2018-10-26 NOTE — OP NOTE
[address]          [address]                               OPERATIVE REPORT    PATIENT NAME: Abi Thakur                        :         1950  MED REC NO:   [id number]                          ROOM:  ACCOUNT NO:   [de-identified]                           ADMIT DATE:  10/25/2018  PROVIDER:     Diamond Mckenzie MD    DATE OF PROCEDURE:  10/25/2018    PREOPERATIVE DIAGNOSIS:  High-grade squamous intraepithelial lesion of  the cervix. POSTOPERATIVE DIAGNOSIS:  High-grade squamous intraepithelial lesion  of the cervix. OPERATION PERFORMED:  LEEP. SURGEON:  Diamond Mckenzie MD    ANESTHESIA:  General and local anesthetic. ESTIMATED BLOOD LOSS:  Less than 50 mL. PATHOLOGY:  LEEP specimen, endocervical curetting. DRAINS:  None. COMPLICATIONS:  None. DISPOSITION:  Stable to recovery room. INDICATIONS:  The patient is a 59-year-old female. She is  2,  para 2-0-0-2. She presents with high-grade squamous intraepithelial  lesion on Pap smear. A colposcopy confirmed the Pap smear. I  recommended the LEEP procedure. I discussed the technique,  the  recovery, and the risks. They include, but are not limited to,  bleeding, infection, damage to her internal organs such as bladder and  bowel along with need for subsequent reparative surgery if damage  occurred. The patient voiced understanding and agreed to proceed with  the surgery. OPERATIVE PROCEDURE:  The patient was taken to the operating room and  was prepped and draped in the normal sterile fashion in dorsal  lithotomy position. A coated speculum was placed in the patient's  vagina. The cervix was identified. The cervix was injected with  lidocaine with epinephrine solution. The cervix was painted with  Lugol's solution. Transformation zone was identified. The LEEP was performed across the transformation zone using 50 mane  of cutting current.

## 2018-10-29 ENCOUNTER — CARE COORDINATION (OUTPATIENT)
Dept: CARE COORDINATION | Age: 68
End: 2018-10-29

## 2018-11-20 ENCOUNTER — OFFICE VISIT (OUTPATIENT)
Dept: GYNECOLOGY | Age: 68
End: 2018-11-20

## 2018-11-20 VITALS
TEMPERATURE: 98.6 F | SYSTOLIC BLOOD PRESSURE: 139 MMHG | HEIGHT: 59 IN | DIASTOLIC BLOOD PRESSURE: 73 MMHG | WEIGHT: 159.2 LBS | HEART RATE: 101 BPM | BODY MASS INDEX: 32.09 KG/M2

## 2018-11-20 DIAGNOSIS — R87.613 HGSIL (HIGH GRADE SQUAMOUS INTRAEPITHELIAL LESION) ON PAP SMEAR OF CERVIX: Primary | ICD-10-CM

## 2018-11-20 PROCEDURE — 1036F TOBACCO NON-USER: CPT | Performed by: OBSTETRICS & GYNECOLOGY

## 2018-11-20 PROCEDURE — 3017F COLORECTAL CA SCREEN DOC REV: CPT | Performed by: OBSTETRICS & GYNECOLOGY

## 2018-11-20 PROCEDURE — 99024 POSTOP FOLLOW-UP VISIT: CPT | Performed by: OBSTETRICS & GYNECOLOGY

## 2018-11-20 PROCEDURE — G8417 CALC BMI ABV UP PARAM F/U: HCPCS | Performed by: OBSTETRICS & GYNECOLOGY

## 2018-11-20 PROCEDURE — G8427 DOCREV CUR MEDS BY ELIG CLIN: HCPCS | Performed by: OBSTETRICS & GYNECOLOGY

## 2018-11-20 PROCEDURE — 1101F PT FALLS ASSESS-DOCD LE1/YR: CPT | Performed by: OBSTETRICS & GYNECOLOGY

## 2018-11-20 PROCEDURE — 1123F ACP DISCUSS/DSCN MKR DOCD: CPT | Performed by: OBSTETRICS & GYNECOLOGY

## 2018-11-20 PROCEDURE — 4040F PNEUMOC VAC/ADMIN/RCVD: CPT | Performed by: OBSTETRICS & GYNECOLOGY

## 2018-11-20 PROCEDURE — G8399 PT W/DXA RESULTS DOCUMENT: HCPCS | Performed by: OBSTETRICS & GYNECOLOGY

## 2018-11-20 PROCEDURE — 1090F PRES/ABSN URINE INCON ASSESS: CPT | Performed by: OBSTETRICS & GYNECOLOGY

## 2018-11-20 PROCEDURE — G8598 ASA/ANTIPLAT THER USED: HCPCS | Performed by: OBSTETRICS & GYNECOLOGY

## 2018-11-20 PROCEDURE — G8484 FLU IMMUNIZE NO ADMIN: HCPCS | Performed by: OBSTETRICS & GYNECOLOGY

## 2018-12-03 RX ORDER — AMLODIPINE BESYLATE 10 MG/1
TABLET ORAL
Qty: 30 TABLET | Refills: 0 | Status: SHIPPED | OUTPATIENT
Start: 2018-12-03 | End: 2019-01-04 | Stop reason: SDUPTHER

## 2018-12-04 ENCOUNTER — TELEPHONE (OUTPATIENT)
Dept: GYNECOLOGY | Age: 68
End: 2018-12-04

## 2018-12-04 NOTE — TELEPHONE ENCOUNTER
Called pt and lvm stating surgery is on 12/06/18 @ 11:30am with arrival time being 10am gave pt instructions where to go and advised pt nothing after midnight prior to surgery.  Done

## 2018-12-06 ENCOUNTER — TELEPHONE (OUTPATIENT)
Dept: GYNECOLOGY | Age: 68
End: 2018-12-06

## 2018-12-11 ENCOUNTER — TELEPHONE (OUTPATIENT)
Dept: GYNECOLOGY | Age: 68
End: 2018-12-11

## 2018-12-11 ENCOUNTER — HOSPITAL ENCOUNTER (OUTPATIENT)
Dept: VASCULAR LAB | Age: 68
Discharge: HOME | End: 2018-12-11
Payer: MEDICARE

## 2018-12-11 ENCOUNTER — OFFICE VISIT (OUTPATIENT)
Dept: FAMILY MEDICINE CLINIC | Age: 68
End: 2018-12-11
Payer: MEDICARE

## 2018-12-11 VITALS
RESPIRATION RATE: 14 BRPM | HEART RATE: 96 BPM | SYSTOLIC BLOOD PRESSURE: 124 MMHG | DIASTOLIC BLOOD PRESSURE: 82 MMHG | OXYGEN SATURATION: 98 % | HEIGHT: 59 IN | BODY MASS INDEX: 31.85 KG/M2 | WEIGHT: 158 LBS

## 2018-12-11 DIAGNOSIS — R60.0 EDEMA, LOWER EXTREMITY: ICD-10-CM

## 2018-12-11 DIAGNOSIS — R87.613 HGSIL (HIGH GRADE SQUAMOUS INTRAEPITHELIAL LESION) ON PAP SMEAR OF CERVIX: ICD-10-CM

## 2018-12-11 DIAGNOSIS — J44.1 COPD EXACERBATION (HCC): ICD-10-CM

## 2018-12-11 DIAGNOSIS — J96.90 RESPIRATORY FAILURE, UNSPECIFIED CHRONICITY, UNSPECIFIED WHETHER WITH HYPOXIA OR HYPERCAPNIA (HCC): ICD-10-CM

## 2018-12-11 DIAGNOSIS — R60.0 LEG EDEMA, LEFT: ICD-10-CM

## 2018-12-11 DIAGNOSIS — J43.2 CENTRILOBULAR EMPHYSEMA (HCC): Primary | ICD-10-CM

## 2018-12-11 PROCEDURE — G8417 CALC BMI ABV UP PARAM F/U: HCPCS | Performed by: INTERNAL MEDICINE

## 2018-12-11 PROCEDURE — 99214 OFFICE O/P EST MOD 30 MIN: CPT | Performed by: INTERNAL MEDICINE

## 2018-12-11 PROCEDURE — 4040F PNEUMOC VAC/ADMIN/RCVD: CPT | Performed by: INTERNAL MEDICINE

## 2018-12-11 PROCEDURE — G8598 ASA/ANTIPLAT THER USED: HCPCS | Performed by: INTERNAL MEDICINE

## 2018-12-11 PROCEDURE — 1123F ACP DISCUSS/DSCN MKR DOCD: CPT | Performed by: INTERNAL MEDICINE

## 2018-12-11 PROCEDURE — 1101F PT FALLS ASSESS-DOCD LE1/YR: CPT | Performed by: INTERNAL MEDICINE

## 2018-12-11 PROCEDURE — 3017F COLORECTAL CA SCREEN DOC REV: CPT | Performed by: INTERNAL MEDICINE

## 2018-12-11 PROCEDURE — 93971 EXTREMITY STUDY: CPT

## 2018-12-11 PROCEDURE — G8484 FLU IMMUNIZE NO ADMIN: HCPCS | Performed by: INTERNAL MEDICINE

## 2018-12-11 PROCEDURE — G8427 DOCREV CUR MEDS BY ELIG CLIN: HCPCS | Performed by: INTERNAL MEDICINE

## 2018-12-11 PROCEDURE — G8926 SPIRO NO PERF OR DOC: HCPCS | Performed by: INTERNAL MEDICINE

## 2018-12-11 PROCEDURE — 1090F PRES/ABSN URINE INCON ASSESS: CPT | Performed by: INTERNAL MEDICINE

## 2018-12-11 PROCEDURE — 1036F TOBACCO NON-USER: CPT | Performed by: INTERNAL MEDICINE

## 2018-12-11 PROCEDURE — 3023F SPIROM DOC REV: CPT | Performed by: INTERNAL MEDICINE

## 2018-12-11 PROCEDURE — G8399 PT W/DXA RESULTS DOCUMENT: HCPCS | Performed by: INTERNAL MEDICINE

## 2018-12-11 RX ORDER — AZITHROMYCIN 250 MG/1
250 TABLET, FILM COATED ORAL SEE ADMIN INSTRUCTIONS
Qty: 6 TABLET | Refills: 0 | Status: SHIPPED | OUTPATIENT
Start: 2018-12-11 | End: 2018-12-16

## 2018-12-11 RX ORDER — PREDNISONE 10 MG/1
TABLET ORAL
Qty: 32 TABLET | Refills: 0 | Status: SHIPPED | OUTPATIENT
Start: 2018-12-11 | End: 2018-12-18 | Stop reason: ALTCHOICE

## 2018-12-11 ASSESSMENT — ENCOUNTER SYMPTOMS
WHEEZING: 1
NAUSEA: 0
SHORTNESS OF BREATH: 0
COUGH: 1
VOMITING: 0

## 2018-12-11 NOTE — PROGRESS NOTES
30\"s in \[facility] [place] [facility]\"" Diabetes Mother     Coronary Art Dis Mother     Diabetes Sister     High Blood Pressure Sister     Coronary Art Dis Sister      Prior to Visit Medications    Medication Sig Taking?  Authorizing Provider   amLODIPine (NORVASC) 10 MG tablet TAKE 1 TABLET BY MOUTH EVERY DAY Yes Nkechi Dawson MD   Naproxen Sodium (ALEVE) 220 MG CAPS Take 1 tablet by mouth as needed for Pain Yes Historical Provider, MD   albuterol sulfate HFA (PROAIR HFA) 108 (90 Base) MCG/ACT inhaler Inhale 2 puffs into the lungs every 6 hours as needed for Wheezing  Patient taking differently: Inhale 2 puffs into the lungs every 6 hours as needed for Wheezing  Yes Nkechi Dawson MD   furosemide (LASIX) 20 MG tablet Take 1 tablet by mouth daily Yes Nkechi Dawson MD   Respiratory Therapy Supplies (NEBULIZER/TUBING/MOUTHPIECE) KIT 1 kit by Does not apply route daily Yes Anitha Robin MD   ipratropium-albuterol (DUONEB) 0.5-2.5 (3) MG/3ML SOLN nebulizer solution Inhale 3 mLs into the lungs every 4 hours  Patient taking differently: Inhale 1 vial into the lungs every 4 hours as needed  Yes Anitha Robin MD   fluticasone-salmeterol (ADVAIR DISKUS) 250-50 MCG/DOSE AEPB INHALE ONE PUFF INTO THE LUNGS EVERY 12 HOURS Yes Arthur Day MD     Patient Active Problem List   Diagnosis    COPD (chronic obstructive pulmonary disease)--pfts show severe obst defect w severe decrease dlco  +bronchodilator response 6/14--repeat unchanged    Tobacco abuse-pt quit 7/15--cold turkey    Pulmonary nodule, left- on lung cancer screening program-  pet scan 6/16=unchanged billy nodule-little activity lung--pos activoity ge jct (still need to cont puln nod surv x 2 yrs)    Dependent edema--echo 10/15--nl ef--mild diast dysf--advised low salt diet/ stockings/ wt loss    Essential hypertension    Diastolic dysfunction--mild on echo 10/15--nl ef    Pustular psoriasis, Dr. Osvaldo Sullivan Respiratory failure ([facility] [address]    Centrilobular emphysema (HCC)    Mediastinal adenopathy    Lung nodule    SIRS (systemic inflammatory response syndrome) (HCC)    Leukocytosis    Ex-smoker    Tobacco abuse counseling    Elevated brain natriuretic peptide (BNP) level    Enlarged lymph node enlarged ing ly node 2018    Inguinal adenopathy work up  2018 Dr. Celso Kent.  neg path     Edema of right lower extremity aug/sept  2018    Coronary artery calcification    S/P colonoscopy 9/18 [place] - 8mm polyp    HGSIL (high grade squamous intraepithelial lesion) on Pap smear of cervix        LABS:   Lab Results   Component Value Date    GLUCOSE 91 09/11/2018     Lab Results   Component Value Date     09/11/2018    K 3.6 09/11/2018    CREATININE 0.8 09/11/2018     Cholesterol, Total   Date Value Ref Range Status   07/26/2017 166 0 - 199 mg/dL Final     LDL Calculated   Date Value Ref Range Status   07/26/2017 66 <100 mg/dL Final     HDL   Date Value Ref Range Status   07/26/2017 85 (H) 40 - 60 mg/dL Final     Triglycerides   Date Value Ref Range Status   07/26/2017 73 0 - 150 mg/dL Final     Lab Results   Component Value Date    ALT 26 09/11/2018    AST 28 09/11/2018    ALKPHOS 110 09/11/2018    BILITOT 0.3 09/11/2018      Lab Results   Component Value Date    WBC 9.3 09/11/2018    HGB 12.9 09/11/2018    HCT 39.6 09/11/2018    MCV 83.6 09/11/2018     09/11/2018     No results found for: TSH  Lab Results   Component Value Date    LABA1C 5.0 03/12/2018     No results found for: PSA, PSADIA     PHYSICAL EXAM:  /82 (Site: Right Upper Arm, Position: Sitting, Cuff Size: Medium Adult)   Pulse 96   Resp 14   Ht 4' 11\" (1.499 m)   Wt 158 lb (71.7 kg)   LMP  (LMP Unknown)   SpO2 98%   BMI 31.91 kg/m²    Physical Exam   Constitutional: She appears well-developed and well-nourished. HENT:   Head: Normocephalic and atraumatic.    Right Ear: External ear normal.   Left Ear: External ear normal.   Eyes: Conjunctivae are normal.   Cardiovascular:

## 2018-12-18 ENCOUNTER — HOSPITAL ENCOUNTER (OUTPATIENT)
Dept: GENERAL RADIOLOGY | Age: 68
Discharge: HOME | End: 2018-12-18
Payer: MEDICARE

## 2018-12-18 ENCOUNTER — HOSPITAL ENCOUNTER (OUTPATIENT)
Age: 68
Discharge: HOME | End: 2018-12-18
Payer: MEDICARE

## 2018-12-18 ENCOUNTER — OFFICE VISIT (OUTPATIENT)
Dept: FAMILY MEDICINE CLINIC | Age: 68
End: 2018-12-18
Payer: MEDICARE

## 2018-12-18 VITALS
HEART RATE: 98 BPM | OXYGEN SATURATION: 98 % | WEIGHT: 158 LBS | DIASTOLIC BLOOD PRESSURE: 80 MMHG | HEIGHT: 59 IN | SYSTOLIC BLOOD PRESSURE: 138 MMHG | BODY MASS INDEX: 31.85 KG/M2 | RESPIRATION RATE: 16 BRPM

## 2018-12-18 DIAGNOSIS — J44.1 COPD EXACERBATION (HCC): Primary | ICD-10-CM

## 2018-12-18 DIAGNOSIS — R87.613 HGSIL (HIGH GRADE SQUAMOUS INTRAEPITHELIAL LESION) ON PAP SMEAR OF CERVIX: ICD-10-CM

## 2018-12-18 DIAGNOSIS — I10 ESSENTIAL HYPERTENSION: ICD-10-CM

## 2018-12-18 DIAGNOSIS — I50.9 ACUTE CONGESTIVE HEART FAILURE, UNSPECIFIED HEART FAILURE TYPE (HCC): ICD-10-CM

## 2018-12-18 DIAGNOSIS — R06.2 WHEEZING: ICD-10-CM

## 2018-12-18 DIAGNOSIS — J43.2 CENTRILOBULAR EMPHYSEMA (HCC): ICD-10-CM

## 2018-12-18 DIAGNOSIS — R60.0 LOWER EXTREMITY EDEMA: ICD-10-CM

## 2018-12-18 DIAGNOSIS — J44.1 COPD EXACERBATION (HCC): ICD-10-CM

## 2018-12-18 DIAGNOSIS — J96.01 ACUTE RESPIRATORY FAILURE WITH HYPOXEMIA (HCC): ICD-10-CM

## 2018-12-18 LAB
ALBUMIN SERPL-MCNC: 4.4 G/DL (ref 3.4–5)
ANION GAP SERPL CALCULATED.3IONS-SCNC: 20 MMOL/L (ref 3–16)
BUN BLDV-MCNC: 12 MG/DL (ref 7–20)
CALCIUM SERPL-MCNC: 10 MG/DL (ref 8.3–10.6)
CHLORIDE BLD-SCNC: 100 MMOL/L (ref 99–110)
CO2: 25 MMOL/L (ref 21–32)
CREAT SERPL-MCNC: 1.1 MG/DL (ref 0.6–1.2)
GFR AFRICAN AMERICAN: 59
GFR NON-AFRICAN AMERICAN: 49
GLUCOSE BLD-MCNC: 91 MG/DL (ref 70–99)
PHOSPHORUS: 3.5 MG/DL (ref 2.5–4.9)
POTASSIUM SERPL-SCNC: 4 MMOL/L (ref 3.5–5.1)
PRO-BNP: 304 PG/ML (ref 0–124)
SODIUM BLD-SCNC: 145 MMOL/L (ref 136–145)

## 2018-12-18 PROCEDURE — G8417 CALC BMI ABV UP PARAM F/U: HCPCS | Performed by: INTERNAL MEDICINE

## 2018-12-18 PROCEDURE — 3017F COLORECTAL CA SCREEN DOC REV: CPT | Performed by: INTERNAL MEDICINE

## 2018-12-18 PROCEDURE — 4040F PNEUMOC VAC/ADMIN/RCVD: CPT | Performed by: INTERNAL MEDICINE

## 2018-12-18 PROCEDURE — G8484 FLU IMMUNIZE NO ADMIN: HCPCS | Performed by: INTERNAL MEDICINE

## 2018-12-18 PROCEDURE — 1101F PT FALLS ASSESS-DOCD LE1/YR: CPT | Performed by: INTERNAL MEDICINE

## 2018-12-18 PROCEDURE — 71046 X-RAY EXAM CHEST 2 VIEWS: CPT

## 2018-12-18 PROCEDURE — 1036F TOBACCO NON-USER: CPT | Performed by: INTERNAL MEDICINE

## 2018-12-18 PROCEDURE — 1090F PRES/ABSN URINE INCON ASSESS: CPT | Performed by: INTERNAL MEDICINE

## 2018-12-18 PROCEDURE — G8598 ASA/ANTIPLAT THER USED: HCPCS | Performed by: INTERNAL MEDICINE

## 2018-12-18 PROCEDURE — G8399 PT W/DXA RESULTS DOCUMENT: HCPCS | Performed by: INTERNAL MEDICINE

## 2018-12-18 PROCEDURE — G8926 SPIRO NO PERF OR DOC: HCPCS | Performed by: INTERNAL MEDICINE

## 2018-12-18 PROCEDURE — 3023F SPIROM DOC REV: CPT | Performed by: INTERNAL MEDICINE

## 2018-12-18 PROCEDURE — 99214 OFFICE O/P EST MOD 30 MIN: CPT | Performed by: INTERNAL MEDICINE

## 2018-12-18 PROCEDURE — 1123F ACP DISCUSS/DSCN MKR DOCD: CPT | Performed by: INTERNAL MEDICINE

## 2018-12-18 PROCEDURE — G8427 DOCREV CUR MEDS BY ELIG CLIN: HCPCS | Performed by: INTERNAL MEDICINE

## 2018-12-18 RX ORDER — FUROSEMIDE 20 MG/1
20 TABLET ORAL DAILY
Qty: 90 TABLET | Refills: 1 | Status: SHIPPED | COMMUNITY
Start: 2018-12-18 | End: 2019-04-10 | Stop reason: SDUPTHER

## 2018-12-18 RX ORDER — IPRATROPIUM BROMIDE 21 UG/1
2 SPRAY, METERED NASAL 3 TIMES DAILY
Qty: 1 BOTTLE | Refills: 3 | Status: SHIPPED | OUTPATIENT
Start: 2018-12-18 | End: 2018-12-31 | Stop reason: ALTCHOICE

## 2018-12-18 ASSESSMENT — ENCOUNTER SYMPTOMS
DIARRHEA: 0
COUGH: 1
SHORTNESS OF BREATH: 0
CONSTIPATION: 0
WHEEZING: 1

## 2018-12-18 NOTE — PROGRESS NOTES
12/18/2018    This is a 77 y.o. female   Chief Complaint   Patient presents with    Wheezing     f/u   . Here for fu with wheezing and leg edema  Cut down on salt   Legs are better. Wheezing    \" coughing my head off\" at night . Seems to be from a runny nose. Runny nose. very runny nose  Sees dr Dorian Ojeda but has not seen him for this wheezing. Finished steroids  Yesterday    Cervical conization was cancelled due to wheezing. HPI    Review of Systems   Constitutional: Negative for chills and fever. Respiratory: Positive for cough and wheezing. Negative for shortness of breath. Gastrointestinal: Negative for constipation and diarrhea. Neurological: Negative for dizziness and light-headedness. Past Medical History:   Diagnosis Date    COPD (chronic obstructive pulmonary disease) ([facility] [address]     Glaucoma     Hx of blood clots     small-right leg    Hypertension        Prior to Visit Medications    Medication Sig Taking?  Authorizing Provider   amLODIPine (NORVASC) 10 MG tablet TAKE 1 TABLET BY MOUTH EVERY DAY Yes Anita Chi MD   Naproxen Sodium (ALEVE) 220 MG CAPS Take 1 tablet by mouth as needed for Pain Yes Historical Provider, MD   albuterol sulfate HFA (PROAIR HFA) 108 (90 Base) MCG/ACT inhaler Inhale 2 puffs into the lungs every 6 hours as needed for Wheezing  Patient taking differently: Inhale 2 puffs into the lungs every 6 hours as needed for Wheezing  Yes Anita Chi MD   furosemide (LASIX) 20 MG tablet Take 1 tablet by mouth daily Yes Anita Chi MD   Respiratory Therapy Supplies (NEBULIZER/TUBING/MOUTHPIECE) KIT 1 kit by Does not apply route daily Yes Ailyn Tovar MD   ipratropium-albuterol (DUONEB) 0.5-2.5 (3) MG/3ML SOLN nebulizer solution Inhale 3 mLs into the lungs every 4 hours  Patient taking differently: Inhale 1 vial into the lungs every 4 hours as needed  Yes Ailyn Tovar MD   fluticasone-salmeterol (ADVAIR DISKUS) 250-50 MCG/DOSE AEPB INHALE ONE

## 2018-12-18 NOTE — Clinical Note
Arnaldo Mazariegos,  Pt needs cervical conization and has been very wheezy- surgery canceled This is her fu visit ,  I am starting trelegy and dc advair. Would you be able to fu with her also I gave her steroids and abx last visit Will ck cxr , echo Has ho chf also.

## 2018-12-19 ENCOUNTER — CARE COORDINATION (OUTPATIENT)
Dept: CARE COORDINATION | Age: 68
End: 2018-12-19

## 2018-12-19 NOTE — PROGRESS NOTES
Arnaldo Mathur,    I will follow up with her.   Thank you for keeping me updated    Genaro Malik BSN, RN  Nurse Care Coordinator   [phone]

## 2018-12-20 ENCOUNTER — TELEPHONE (OUTPATIENT)
Dept: GYNECOLOGY | Age: 68
End: 2018-12-20

## 2018-12-20 DIAGNOSIS — R79.89 ELEVATED SERUM CREATININE: ICD-10-CM

## 2018-12-20 DIAGNOSIS — R60.0 EDEMA OF RIGHT LOWER EXTREMITY: Primary | ICD-10-CM

## 2018-12-21 ENCOUNTER — OFFICE VISIT (OUTPATIENT)
Dept: PULMONOLOGY | Age: 68
End: 2018-12-21
Payer: MEDICARE

## 2018-12-21 VITALS
RESPIRATION RATE: 16 BRPM | HEART RATE: 82 BPM | WEIGHT: 158 LBS | BODY MASS INDEX: 31.85 KG/M2 | TEMPERATURE: 97.5 F | SYSTOLIC BLOOD PRESSURE: 132 MMHG | OXYGEN SATURATION: 96 % | DIASTOLIC BLOOD PRESSURE: 75 MMHG | HEIGHT: 59 IN

## 2018-12-21 DIAGNOSIS — J06.9 VIRAL URI: ICD-10-CM

## 2018-12-21 DIAGNOSIS — J43.2 CENTRILOBULAR EMPHYSEMA (HCC): ICD-10-CM

## 2018-12-21 PROCEDURE — 1090F PRES/ABSN URINE INCON ASSESS: CPT | Performed by: INTERNAL MEDICINE

## 2018-12-21 PROCEDURE — G8484 FLU IMMUNIZE NO ADMIN: HCPCS | Performed by: INTERNAL MEDICINE

## 2018-12-21 PROCEDURE — G8926 SPIRO NO PERF OR DOC: HCPCS | Performed by: INTERNAL MEDICINE

## 2018-12-21 PROCEDURE — G8399 PT W/DXA RESULTS DOCUMENT: HCPCS | Performed by: INTERNAL MEDICINE

## 2018-12-21 PROCEDURE — 1101F PT FALLS ASSESS-DOCD LE1/YR: CPT | Performed by: INTERNAL MEDICINE

## 2018-12-21 PROCEDURE — 4040F PNEUMOC VAC/ADMIN/RCVD: CPT | Performed by: INTERNAL MEDICINE

## 2018-12-21 PROCEDURE — 1123F ACP DISCUSS/DSCN MKR DOCD: CPT | Performed by: INTERNAL MEDICINE

## 2018-12-21 PROCEDURE — G8427 DOCREV CUR MEDS BY ELIG CLIN: HCPCS | Performed by: INTERNAL MEDICINE

## 2018-12-21 PROCEDURE — 99213 OFFICE O/P EST LOW 20 MIN: CPT | Performed by: INTERNAL MEDICINE

## 2018-12-21 PROCEDURE — 3023F SPIROM DOC REV: CPT | Performed by: INTERNAL MEDICINE

## 2018-12-21 PROCEDURE — 3017F COLORECTAL CA SCREEN DOC REV: CPT | Performed by: INTERNAL MEDICINE

## 2018-12-21 PROCEDURE — G8598 ASA/ANTIPLAT THER USED: HCPCS | Performed by: INTERNAL MEDICINE

## 2018-12-21 PROCEDURE — 1036F TOBACCO NON-USER: CPT | Performed by: INTERNAL MEDICINE

## 2018-12-21 PROCEDURE — G8417 CALC BMI ABV UP PARAM F/U: HCPCS | Performed by: INTERNAL MEDICINE

## 2018-12-21 NOTE — ASSESSMENT & PLAN NOTE
This appears to be consistent with viral uri. I advise antibiotics is not likely to help. I advised fluid and time with OTC medications for symptoms.

## 2018-12-28 ENCOUNTER — TELEPHONE (OUTPATIENT)
Dept: FAMILY MEDICINE CLINIC | Age: 68
End: 2018-12-28

## 2019-01-02 ENCOUNTER — TELEPHONE (OUTPATIENT)
Dept: GYNECOLOGY | Age: 69
End: 2019-01-02

## 2019-01-02 ENCOUNTER — ANESTHESIA EVENT (OUTPATIENT)
Dept: OPERATING ROOM | Age: 69
End: 2019-01-02
Payer: MEDICARE

## 2019-01-03 ENCOUNTER — HOSPITAL ENCOUNTER (OUTPATIENT)
Age: 69
Setting detail: OUTPATIENT SURGERY
Discharge: HOME | End: 2019-01-03
Attending: OBSTETRICS & GYNECOLOGY | Admitting: OBSTETRICS & GYNECOLOGY
Payer: MEDICARE

## 2019-01-03 ENCOUNTER — ANESTHESIA (OUTPATIENT)
Dept: OPERATING ROOM | Age: 69
End: 2019-01-03
Payer: MEDICARE

## 2019-01-03 VITALS
BODY MASS INDEX: 31.82 KG/M2 | OXYGEN SATURATION: 97 % | RESPIRATION RATE: 16 BRPM | TEMPERATURE: 97.4 F | WEIGHT: 157.85 LBS | HEART RATE: 93 BPM | DIASTOLIC BLOOD PRESSURE: 62 MMHG | SYSTOLIC BLOOD PRESSURE: 106 MMHG | HEIGHT: 59 IN

## 2019-01-03 VITALS
OXYGEN SATURATION: 100 % | RESPIRATION RATE: 15 BRPM | DIASTOLIC BLOOD PRESSURE: 46 MMHG | SYSTOLIC BLOOD PRESSURE: 84 MMHG

## 2019-01-03 DIAGNOSIS — R87.613 HGSIL (HIGH GRADE SQUAMOUS INTRAEPITHELIAL LESION) ON PAP SMEAR OF CERVIX: Primary | ICD-10-CM

## 2019-01-03 PROCEDURE — 6370000000 HC RX 637 (ALT 250 FOR IP)

## 2019-01-03 PROCEDURE — 7100000011 HC PHASE II RECOVERY - ADDTL 15 MIN: Performed by: OBSTETRICS & GYNECOLOGY

## 2019-01-03 PROCEDURE — 3700000000 HC ANESTHESIA ATTENDED CARE: Performed by: OBSTETRICS & GYNECOLOGY

## 2019-01-03 PROCEDURE — 2580000003 HC RX 258: Performed by: ANESTHESIOLOGY

## 2019-01-03 PROCEDURE — 2709999900 HC NON-CHARGEABLE SUPPLY: Performed by: OBSTETRICS & GYNECOLOGY

## 2019-01-03 PROCEDURE — 88307 TISSUE EXAM BY PATHOLOGIST: CPT

## 2019-01-03 PROCEDURE — 6360000002 HC RX W HCPCS

## 2019-01-03 PROCEDURE — 2500000003 HC RX 250 WO HCPCS

## 2019-01-03 PROCEDURE — 7100000000 HC PACU RECOVERY - FIRST 15 MIN: Performed by: OBSTETRICS & GYNECOLOGY

## 2019-01-03 PROCEDURE — 3600000003 HC SURGERY LEVEL 3 BASE: Performed by: OBSTETRICS & GYNECOLOGY

## 2019-01-03 PROCEDURE — 3600000013 HC SURGERY LEVEL 3 ADDTL 15MIN: Performed by: OBSTETRICS & GYNECOLOGY

## 2019-01-03 PROCEDURE — 7100000001 HC PACU RECOVERY - ADDTL 15 MIN: Performed by: OBSTETRICS & GYNECOLOGY

## 2019-01-03 PROCEDURE — 88305 TISSUE EXAM BY PATHOLOGIST: CPT

## 2019-01-03 PROCEDURE — 3700000001 HC ADD 15 MINUTES (ANESTHESIA): Performed by: OBSTETRICS & GYNECOLOGY

## 2019-01-03 PROCEDURE — 57520 CONIZATION OF CERVIX: CPT | Performed by: OBSTETRICS & GYNECOLOGY

## 2019-01-03 PROCEDURE — 2500000003 HC RX 250 WO HCPCS: Performed by: OBSTETRICS & GYNECOLOGY

## 2019-01-03 PROCEDURE — 6360000002 HC RX W HCPCS: Performed by: ANESTHESIOLOGY

## 2019-01-03 PROCEDURE — 7100000010 HC PHASE II RECOVERY - FIRST 15 MIN: Performed by: OBSTETRICS & GYNECOLOGY

## 2019-01-03 PROCEDURE — S0028 INJECTION, FAMOTIDINE, 20 MG: HCPCS | Performed by: ANESTHESIOLOGY

## 2019-01-03 RX ORDER — OXYCODONE HYDROCHLORIDE 5 MG/1
5 TABLET ORAL PRN
Status: DISCONTINUED | OUTPATIENT
Start: 2019-01-03 | End: 2019-01-03 | Stop reason: HOSPADM

## 2019-01-03 RX ORDER — MEPERIDINE HYDROCHLORIDE 25 MG/ML
12.5 INJECTION INTRAMUSCULAR; INTRAVENOUS; SUBCUTANEOUS EVERY 5 MIN PRN
Status: DISCONTINUED | OUTPATIENT
Start: 2019-01-03 | End: 2019-01-03 | Stop reason: HOSPADM

## 2019-01-03 RX ORDER — ONDANSETRON 2 MG/ML
4 INJECTION INTRAMUSCULAR; INTRAVENOUS
Status: DISCONTINUED | OUTPATIENT
Start: 2019-01-03 | End: 2019-01-03 | Stop reason: HOSPADM

## 2019-01-03 RX ORDER — SODIUM CHLORIDE 0.9 % (FLUSH) 0.9 %
10 SYRINGE (ML) INJECTION PRN
Status: DISCONTINUED | OUTPATIENT
Start: 2019-01-03 | End: 2019-01-03 | Stop reason: HOSPADM

## 2019-01-03 RX ORDER — ONDANSETRON 2 MG/ML
INJECTION INTRAMUSCULAR; INTRAVENOUS PRN
Status: DISCONTINUED | OUTPATIENT
Start: 2019-01-03 | End: 2019-01-03 | Stop reason: SDUPTHER

## 2019-01-03 RX ORDER — MIDAZOLAM HYDROCHLORIDE 1 MG/ML
INJECTION INTRAMUSCULAR; INTRAVENOUS PRN
Status: DISCONTINUED | OUTPATIENT
Start: 2019-01-03 | End: 2019-01-03 | Stop reason: SDUPTHER

## 2019-01-03 RX ORDER — FENTANYL CITRATE 50 UG/ML
50 INJECTION, SOLUTION INTRAMUSCULAR; INTRAVENOUS EVERY 5 MIN PRN
Status: DISCONTINUED | OUTPATIENT
Start: 2019-01-03 | End: 2019-01-03 | Stop reason: HOSPADM

## 2019-01-03 RX ORDER — DEXAMETHASONE SODIUM PHOSPHATE 4 MG/ML
INJECTION, SOLUTION INTRA-ARTICULAR; INTRALESIONAL; INTRAMUSCULAR; INTRAVENOUS; SOFT TISSUE PRN
Status: DISCONTINUED | OUTPATIENT
Start: 2019-01-03 | End: 2019-01-03 | Stop reason: SDUPTHER

## 2019-01-03 RX ORDER — SODIUM CHLORIDE 0.9 % (FLUSH) 0.9 %
10 SYRINGE (ML) INJECTION EVERY 12 HOURS SCHEDULED
Status: DISCONTINUED | OUTPATIENT
Start: 2019-01-03 | End: 2019-01-03 | Stop reason: HOSPADM

## 2019-01-03 RX ORDER — FENTANYL CITRATE 50 UG/ML
25 INJECTION, SOLUTION INTRAMUSCULAR; INTRAVENOUS EVERY 5 MIN PRN
Status: DISCONTINUED | OUTPATIENT
Start: 2019-01-03 | End: 2019-01-03 | Stop reason: HOSPADM

## 2019-01-03 RX ORDER — KETOROLAC TROMETHAMINE 30 MG/ML
INJECTION, SOLUTION INTRAMUSCULAR; INTRAVENOUS PRN
Status: DISCONTINUED | OUTPATIENT
Start: 2019-01-03 | End: 2019-01-03 | Stop reason: SDUPTHER

## 2019-01-03 RX ORDER — OXYCODONE HYDROCHLORIDE 5 MG/1
10 TABLET ORAL PRN
Status: DISCONTINUED | OUTPATIENT
Start: 2019-01-03 | End: 2019-01-03 | Stop reason: HOSPADM

## 2019-01-03 RX ORDER — ALBUTEROL SULFATE 90 UG/1
AEROSOL, METERED RESPIRATORY (INHALATION) PRN
Status: DISCONTINUED | OUTPATIENT
Start: 2019-01-03 | End: 2019-01-03 | Stop reason: SDUPTHER

## 2019-01-03 RX ORDER — MORPHINE SULFATE 2 MG/ML
2 INJECTION, SOLUTION INTRAMUSCULAR; INTRAVENOUS EVERY 5 MIN PRN
Status: DISCONTINUED | OUTPATIENT
Start: 2019-01-03 | End: 2019-01-03 | Stop reason: HOSPADM

## 2019-01-03 RX ORDER — MORPHINE SULFATE 2 MG/ML
1 INJECTION, SOLUTION INTRAMUSCULAR; INTRAVENOUS EVERY 5 MIN PRN
Status: DISCONTINUED | OUTPATIENT
Start: 2019-01-03 | End: 2019-01-03 | Stop reason: HOSPADM

## 2019-01-03 RX ORDER — LIDOCAINE HYDROCHLORIDE AND EPINEPHRINE 10; 10 MG/ML; UG/ML
INJECTION, SOLUTION INFILTRATION; PERINEURAL
Status: COMPLETED | OUTPATIENT
Start: 2019-01-03 | End: 2019-01-03

## 2019-01-03 RX ORDER — PROPOFOL 10 MG/ML
INJECTION, EMULSION INTRAVENOUS PRN
Status: DISCONTINUED | OUTPATIENT
Start: 2019-01-03 | End: 2019-01-03 | Stop reason: SDUPTHER

## 2019-01-03 RX ORDER — FENTANYL CITRATE 50 UG/ML
INJECTION, SOLUTION INTRAMUSCULAR; INTRAVENOUS PRN
Status: DISCONTINUED | OUTPATIENT
Start: 2019-01-03 | End: 2019-01-03 | Stop reason: SDUPTHER

## 2019-01-03 RX ORDER — LIDOCAINE HYDROCHLORIDE 20 MG/ML
INJECTION, SOLUTION INFILTRATION; PERINEURAL PRN
Status: DISCONTINUED | OUTPATIENT
Start: 2019-01-03 | End: 2019-01-03 | Stop reason: SDUPTHER

## 2019-01-03 RX ORDER — OXYCODONE HYDROCHLORIDE AND ACETAMINOPHEN 5; 325 MG/1; MG/1
1 TABLET ORAL EVERY 6 HOURS PRN
Qty: 20 TABLET | Refills: 0 | Status: SHIPPED | OUTPATIENT
Start: 2019-01-03 | End: 2019-01-08

## 2019-01-03 RX ORDER — SODIUM CHLORIDE 9 MG/ML
INJECTION, SOLUTION INTRAVENOUS CONTINUOUS
Status: DISCONTINUED | OUTPATIENT
Start: 2019-01-03 | End: 2019-01-03 | Stop reason: HOSPADM

## 2019-01-03 RX ADMIN — DEXAMETHASONE SODIUM PHOSPHATE 8 MG: 4 INJECTION, SOLUTION INTRAMUSCULAR; INTRAVENOUS at 13:53

## 2019-01-03 RX ADMIN — MIDAZOLAM 1 MG: 1 INJECTION INTRAMUSCULAR; INTRAVENOUS at 13:46

## 2019-01-03 RX ADMIN — MIDAZOLAM 1 MG: 1 INJECTION INTRAMUSCULAR; INTRAVENOUS at 13:43

## 2019-01-03 RX ADMIN — FENTANYL CITRATE 50 MCG: 50 INJECTION INTRAMUSCULAR; INTRAVENOUS at 13:53

## 2019-01-03 RX ADMIN — SODIUM CHLORIDE: 9 INJECTION, SOLUTION INTRAVENOUS at 11:35

## 2019-01-03 RX ADMIN — LIDOCAINE HYDROCHLORIDE 60 MG: 20 INJECTION, SOLUTION INFILTRATION; PERINEURAL at 13:53

## 2019-01-03 RX ADMIN — SODIUM CHLORIDE: 9 INJECTION, SOLUTION INTRAVENOUS at 14:04

## 2019-01-03 RX ADMIN — Medication 6 PUFF: at 13:53

## 2019-01-03 RX ADMIN — FAMOTIDINE 20 MG: 10 INJECTION, SOLUTION INTRAVENOUS at 11:35

## 2019-01-03 RX ADMIN — FENTANYL CITRATE 50 MCG: 50 INJECTION INTRAMUSCULAR; INTRAVENOUS at 13:55

## 2019-01-03 RX ADMIN — ONDANSETRON 4 MG: 2 INJECTION INTRAMUSCULAR; INTRAVENOUS at 13:53

## 2019-01-03 RX ADMIN — PROPOFOL 150 MG: 10 INJECTION, EMULSION INTRAVENOUS at 13:53

## 2019-01-03 RX ADMIN — KETOROLAC TROMETHAMINE 30 MG: 30 INJECTION, SOLUTION INTRAMUSCULAR at 13:53

## 2019-01-03 ASSESSMENT — PULMONARY FUNCTION TESTS
PIF_VALUE: 14
PIF_VALUE: 3
PIF_VALUE: 2
PIF_VALUE: 1
PIF_VALUE: 2
PIF_VALUE: 20
PIF_VALUE: 2
PIF_VALUE: 3
PIF_VALUE: 2
PIF_VALUE: 2
PIF_VALUE: 4
PIF_VALUE: 3
PIF_VALUE: 2
PIF_VALUE: 3
PIF_VALUE: 2
PIF_VALUE: 2
PIF_VALUE: 3
PIF_VALUE: 2
PIF_VALUE: 1
PIF_VALUE: 3
PIF_VALUE: 2
PIF_VALUE: 1
PIF_VALUE: 2
PIF_VALUE: 3
PIF_VALUE: 2
PIF_VALUE: 23
PIF_VALUE: 2
PIF_VALUE: 2

## 2019-01-03 ASSESSMENT — LIFESTYLE VARIABLES: SMOKING_STATUS: 0

## 2019-01-03 ASSESSMENT — PAIN SCALES - GENERAL
PAINLEVEL_OUTOF10: 0
PAINLEVEL_OUTOF10: 0

## 2019-01-03 ASSESSMENT — PAIN - FUNCTIONAL ASSESSMENT: PAIN_FUNCTIONAL_ASSESSMENT: 0-10

## 2019-01-07 RX ORDER — AMLODIPINE BESYLATE 10 MG/1
TABLET ORAL
Qty: 90 TABLET | Refills: 1 | Status: SHIPPED | OUTPATIENT
Start: 2019-01-07 | End: 2019-07-01 | Stop reason: SDUPTHER

## 2019-01-24 ENCOUNTER — CARE COORDINATION (OUTPATIENT)
Dept: CARE COORDINATION | Age: 69
End: 2019-01-24

## 2019-01-25 ENCOUNTER — OFFICE VISIT (OUTPATIENT)
Dept: GYNECOLOGY | Age: 69
End: 2019-01-25

## 2019-01-25 VITALS
DIASTOLIC BLOOD PRESSURE: 76 MMHG | TEMPERATURE: 98 F | SYSTOLIC BLOOD PRESSURE: 170 MMHG | HEIGHT: 59 IN | BODY MASS INDEX: 32.42 KG/M2 | WEIGHT: 160.8 LBS | HEART RATE: 112 BPM

## 2019-01-25 DIAGNOSIS — R87.613 HGSIL (HIGH GRADE SQUAMOUS INTRAEPITHELIAL LESION) ON PAP SMEAR OF CERVIX: Primary | ICD-10-CM

## 2019-01-25 PROCEDURE — 3017F COLORECTAL CA SCREEN DOC REV: CPT | Performed by: OBSTETRICS & GYNECOLOGY

## 2019-01-25 PROCEDURE — G8427 DOCREV CUR MEDS BY ELIG CLIN: HCPCS | Performed by: OBSTETRICS & GYNECOLOGY

## 2019-01-25 PROCEDURE — 1090F PRES/ABSN URINE INCON ASSESS: CPT | Performed by: OBSTETRICS & GYNECOLOGY

## 2019-01-25 PROCEDURE — G8417 CALC BMI ABV UP PARAM F/U: HCPCS | Performed by: OBSTETRICS & GYNECOLOGY

## 2019-01-25 PROCEDURE — 1123F ACP DISCUSS/DSCN MKR DOCD: CPT | Performed by: OBSTETRICS & GYNECOLOGY

## 2019-01-25 PROCEDURE — G8399 PT W/DXA RESULTS DOCUMENT: HCPCS | Performed by: OBSTETRICS & GYNECOLOGY

## 2019-01-25 PROCEDURE — 1036F TOBACCO NON-USER: CPT | Performed by: OBSTETRICS & GYNECOLOGY

## 2019-01-25 PROCEDURE — G8484 FLU IMMUNIZE NO ADMIN: HCPCS | Performed by: OBSTETRICS & GYNECOLOGY

## 2019-01-25 PROCEDURE — G8599 NO ASA/ANTIPLAT THER USE RNG: HCPCS | Performed by: OBSTETRICS & GYNECOLOGY

## 2019-01-25 PROCEDURE — 1101F PT FALLS ASSESS-DOCD LE1/YR: CPT | Performed by: OBSTETRICS & GYNECOLOGY

## 2019-01-25 PROCEDURE — 4040F PNEUMOC VAC/ADMIN/RCVD: CPT | Performed by: OBSTETRICS & GYNECOLOGY

## 2019-01-25 PROCEDURE — 99024 POSTOP FOLLOW-UP VISIT: CPT | Performed by: OBSTETRICS & GYNECOLOGY

## 2019-03-19 ENCOUNTER — CARE COORDINATION (OUTPATIENT)
Dept: CARE COORDINATION | Age: 69
End: 2019-03-19

## 2019-04-02 RX ORDER — FUROSEMIDE 20 MG/1
TABLET ORAL
Qty: 90 TABLET | Refills: 0 | Status: SHIPPED | OUTPATIENT
Start: 2019-04-02 | End: 2019-07-01 | Stop reason: SDUPTHER

## 2019-04-10 ENCOUNTER — OFFICE VISIT (OUTPATIENT)
Dept: PULMONOLOGY | Age: 69
End: 2019-04-10
Payer: MEDICARE

## 2019-04-10 VITALS
BODY MASS INDEX: 31.45 KG/M2 | DIASTOLIC BLOOD PRESSURE: 68 MMHG | RESPIRATION RATE: 16 BRPM | SYSTOLIC BLOOD PRESSURE: 138 MMHG | HEART RATE: 92 BPM | TEMPERATURE: 97.8 F | OXYGEN SATURATION: 96 % | WEIGHT: 156 LBS | HEIGHT: 59 IN

## 2019-04-10 DIAGNOSIS — Z87.891 EX-SMOKER: ICD-10-CM

## 2019-04-10 DIAGNOSIS — J43.2 CENTRILOBULAR EMPHYSEMA (HCC): Primary | ICD-10-CM

## 2019-04-10 PROBLEM — J96.90 RESPIRATORY FAILURE (HCC): Status: RESOLVED | Noted: 2018-01-06 | Resolved: 2019-04-10

## 2019-04-10 PROBLEM — J06.9 VIRAL URI: Status: RESOLVED | Noted: 2018-12-21 | Resolved: 2019-04-10

## 2019-04-10 PROCEDURE — 1036F TOBACCO NON-USER: CPT | Performed by: INTERNAL MEDICINE

## 2019-04-10 PROCEDURE — 99213 OFFICE O/P EST LOW 20 MIN: CPT | Performed by: INTERNAL MEDICINE

## 2019-04-10 PROCEDURE — 1090F PRES/ABSN URINE INCON ASSESS: CPT | Performed by: INTERNAL MEDICINE

## 2019-04-10 PROCEDURE — G8926 SPIRO NO PERF OR DOC: HCPCS | Performed by: INTERNAL MEDICINE

## 2019-04-10 PROCEDURE — 3017F COLORECTAL CA SCREEN DOC REV: CPT | Performed by: INTERNAL MEDICINE

## 2019-04-10 PROCEDURE — G8427 DOCREV CUR MEDS BY ELIG CLIN: HCPCS | Performed by: INTERNAL MEDICINE

## 2019-04-10 PROCEDURE — G8417 CALC BMI ABV UP PARAM F/U: HCPCS | Performed by: INTERNAL MEDICINE

## 2019-04-10 PROCEDURE — G8599 NO ASA/ANTIPLAT THER USE RNG: HCPCS | Performed by: INTERNAL MEDICINE

## 2019-04-10 PROCEDURE — 3023F SPIROM DOC REV: CPT | Performed by: INTERNAL MEDICINE

## 2019-04-10 PROCEDURE — 1123F ACP DISCUSS/DSCN MKR DOCD: CPT | Performed by: INTERNAL MEDICINE

## 2019-04-10 PROCEDURE — G8399 PT W/DXA RESULTS DOCUMENT: HCPCS | Performed by: INTERNAL MEDICINE

## 2019-04-10 PROCEDURE — 4040F PNEUMOC VAC/ADMIN/RCVD: CPT | Performed by: INTERNAL MEDICINE

## 2019-04-10 NOTE — PROGRESS NOTES
REASON FOR CONSULTATION/CC:   Chief Complaint   Patient presents with    COPD     f/u COPD        Consult at request of  Opal Grover MD     PCP: Opal Grover MD    HISTORY OF PRESENT ILLNESS: Shahrzad Rausch is a 77y.o. year old female with a history of tobacco abuse who presents :         Copd  Doing better with Trelegy   No issues with cost or side effects             PAST MEDICAL HISTORY:  Past Medical History:   Diagnosis Date    COPD (chronic obstructive pulmonary disease) ([address]     Glaucoma     Hx of blood clots     small-right leg    Hypertension        PAST SURGICAL HISTORY:  Past Surgical History:   Procedure Laterality Date    CERVIX BIOPSY N/A 1/3/2019    COLD KNIFE CONIZATION, ENDOCERVICAL CURETTAGE performed by Geoff Le MD at [address] [place] [address]  09/20/2018    Dr. Royal Teague    LEEMARILEE  10/25/2018     LEEP, ecc    AK BIOPSY/EXCISION, LYMPH NODE(S) Right 10/1/2018    EXCISIONAL BRIOPSY OF RIGHT GROIN LYMPH NODE performed by Krystyna Quezada MD at [place] [address] N/A 10/25/2018    LOOP ELECTROSURGICAL EXCISION PROCEDURE performed by Geoff Le MD at [address] [place] [address]      Dr. Kerwin Jernigan:  family history includes Coronary Art Dis in her mother and sister; Diabetes in her mother and sister; High Blood Pressure in her sister; Other in her father. SOCIAL HISTORY:   reports that she quit smoking about 3 years ago. Her smoking use included cigarettes. She started smoking about 53 years ago. She has a 5.50 pack-year smoking history. She has never used smokeless tobacco.      ALLERGIES:  Patient is allergic to sulfa antibiotics.     REVIEW OF SYSTEMS:  Constitutional: Negative for fever   HENT: Negative for sore throat  Respiratory: Negative for dyspnea, cough  Cardiovascular: Negative for chest pain  Gastrointestinal: Negative for vomiting, diarrhea   Skin: Negative for rash  Psychiatric/Behavorial: Negative for anxiety     Objective:   PHYSICAL EXAM:  Blood pressure 138/68, pulse 92, temperature 97.8 °F (36.6 °C), temperature source Oral, resp. rate 16, height 4' 11\" (1.499 m), weight 156 lb (70.8 kg), SpO2 96 %, not currently breastfeeding.'  Gen: No distress. ENT:   Resp: No accessory muscle use. No crackles. No wheezes. No rhonchi. CV: Regular rate. Regular rhythm. No murmur or rub. No edema. Skin: Warm, dry, normal texture and turgor. No nodule on exposed extremities. M/S: No cyanosis. No clubbing. No joint deformity. Psych: Oriented x 3. No anxiety. Awake. Alert. Intact judgement and insight. Good Mood / Affect. Memory appears in tact     Current Outpatient Medications   Medication Sig Dispense Refill    furosemide (LASIX) 20 MG tablet TAKE 1 TABLET BY MOUTH EVERY DAY 90 tablet 0    amLODIPine (NORVASC) 10 MG tablet TAKE 1 TABLET BY MOUTH EVERY DAY 90 tablet 1    Fluticasone-Umeclidin-Vilant (TRELEGY ELLIPTA) 100-62.5-25 MCG/INH AEPB Inhale 1 Inhaler into the lungs daily 1 each 0    Naproxen Sodium (ALEVE) 220 MG CAPS Take 1 tablet by mouth as needed for Pain      albuterol sulfate HFA (PROAIR HFA) 108 (90 Base) MCG/ACT inhaler Inhale 2 puffs into the lungs every 6 hours as needed for Wheezing (Patient taking differently: Inhale 2 puffs into the lungs every 6 hours as needed for Wheezing ) 1 Inhaler 3    Respiratory Therapy Supplies (NEBULIZER/TUBING/MOUTHPIECE) KIT 1 kit by Does not apply route daily 1 kit 0    ipratropium-albuterol (DUONEB) 0.5-2.5 (3) MG/3ML SOLN nebulizer solution Inhale 3 mLs into the lungs every 4 hours (Patient taking differently: Inhale 1 vial into the lungs every 4 hours as needed ) 360 mL 5     No current facility-administered medications for this visit.         Data Reviewed:   CBC and Renal reviewed  Last CBC  Lab Results   Component Value Date    WBC 9.3 09/11/2018    RBC 4.74 09/11/2018    HGB 12.9 09/11/2018    MCV 83.6 09/11/2018     09/11/2018 Last Renal  Lab Results   Component Value Date     12/18/2018    K 4.0 12/18/2018     12/18/2018    CO2 25 12/18/2018    CO2 26 09/11/2018    CO2 26 08/30/2018    BUN 12 12/18/2018    CREATININE 1.1 12/18/2018    GLUCOSE 91 12/18/2018    CALCIUM 10.0 12/18/2018       Last ABG  POC Blood Gas: No results found for: POCPH, POCPCO2, POCPO2, POCHCO3, NBEA, ZDEY2VCN  No results for input(s): PH, PCO2, PO2, HCO3, BE, O2SAT in the last 72 hours. Assessment:     ·  Pulmonary Nodule, stable from 2015 to 2018  · COPD    Plan:        Problem List Items Addressed This Visit     Ex-smoker     Needs lung cancer screening Sept 2019. COPD (chronic obstructive pulmonary disease)--pfts show severe obst defect w severe decrease dlco  +bronchodilator response 6/14--repeat unchanged - Primary     Doing well with Trelegy. No change. This note was transcribed using [id number]. Please disregard any translational errors.

## 2019-04-24 ENCOUNTER — CARE COORDINATION (OUTPATIENT)
Dept: CARE COORDINATION | Age: 69
End: 2019-04-24

## 2019-04-24 NOTE — CARE COORDINATION
Ambulatory Care Coordination Note  4/24/2019  CM Risk Score: 2  Anshul Mortality Risk Score: 8    ACC: Zeny Friend, RN    Summary Note:     Patient reports she loved the Trelegy inhaler but cannot afford the medication; her cost $ 300.00  Can you prescribe an alternative? Reviewed [facility] list of available medications and it is not listed. PLAN:  Assisted patient with scheduling f/u appointment with PCP 5/8/19  Continue 30 minute/day walks on treadmill      FYI:  Reviewed COPD zones  Reviewed COPD medications           General Assessment    Do you have any symptoms that are causing concern?:  No       COPD Assessment    Does the patient understand envrionmental exposure?:  Yes  Is the patient able to verbalize Rescue vs. Long Acting medications?:  Yes  Does the patient have a nebulizer?:  Yes  Does the patient use a space with inhaled medications?:  No     No patient-reported symptoms         Symptoms:      Have you had a recent diagnosis of pneumonia either by PCP or at a hospital?:  No         Care Coordination Interventions    Program Enrollment:  Rising Risk  Referral from Primary Care Provider:  No  Suggested Interventions and Community Resources  Medi Set or Pill Pack:  Declined  Zone Management Tools: In Process  Other Services or Interventions:  COPD         Goals Addressed                 This Visit's Progress       Care Coordination     Activity Plan   Improving     I will increase my activity by 30 min/day walking on treadmill    Barriers: Recently had surgery and waiting to be cleared for exercise  Plan for overcoming my barriers: Will discuss with doctor on 1/25/19  Confidence: ./10  Anticipated Goal Completion Date: 2/24/19            Prior to Admission medications    Medication Sig Start Date End Date Taking?  Authorizing Provider   furosemide (LASIX) 20 MG tablet TAKE 1 TABLET BY MOUTH EVERY DAY 4/2/19  Yes Davion Goldsmith MD   amLODIPine (NORVASC) 10 MG tablet TAKE 1 TABLET BY MOUTH EVERY DAY 1/7/19  Yes Claudean Money, MD   Fluticasone-Umeclidin-Vilant (TRELEGY ELLIPTA) 469-34.4-35 MCG/INH AEPB Inhale 1 Inhaler into the lungs daily 12/18/18  Yes Claudean Money, MD   Naproxen Sodium (ALEVE) 220 MG CAPS Take 1 tablet by mouth as needed for Pain   Yes Historical Provider, MD   albuterol sulfate HFA (PROAIR HFA) 108 (90 Base) MCG/ACT inhaler Inhale 2 puffs into the lungs every 6 hours as needed for Wheezing  Patient taking differently: Inhale 2 puffs into the lungs every 6 hours as needed for Wheezing  7/18/18  Yes Claudean Money, MD   Respiratory Therapy Supplies (NEBULIZER/TUBING/MOUTHPIECE) KIT 1 kit by Does not apply route daily 1/4/18  Yes Vangie Reddy MD   ipratropium-albuterol (DUONEB) 0.5-2.5 (3) MG/3ML SOLN nebulizer solution Inhale 3 mLs into the lungs every 4 hours  Patient taking differently: Inhale 1 vial into the lungs every 4 hours as needed  1/4/18  Yes Vangie Reddy MD       Future Appointments   Date Time Provider Gloria Anabela   4/26/2019 11:00 AM Jessica Resendez MD [facility] [address] [facility]   5/8/2019 11:40 AM Claudean Money, MD [facility]   10/2/2019 10:40 AM [facility]dy MD [facility]

## 2019-04-25 NOTE — CARE COORDINATION
Trelegy has a $10.00 a month coupon good for one year but she has Medicare and this disqualifies her for the coupon. The Patient Assistance program for this medication requires proof that she has spent $600.00 in out out of pocket expenses.

## 2019-04-25 NOTE — TELEPHONE ENCOUNTER
I wonder if we could ask the company for pt assistance? Pulmonary probably has the contact information. All the meds will be pricey I believe.

## 2019-04-26 ENCOUNTER — OFFICE VISIT (OUTPATIENT)
Dept: GYNECOLOGY | Age: 69
End: 2019-04-26
Payer: MEDICARE

## 2019-04-26 VITALS
TEMPERATURE: 98.5 F | HEART RATE: 95 BPM | DIASTOLIC BLOOD PRESSURE: 76 MMHG | HEIGHT: 59 IN | WEIGHT: 157 LBS | SYSTOLIC BLOOD PRESSURE: 147 MMHG | RESPIRATION RATE: 16 BRPM | BODY MASS INDEX: 31.65 KG/M2 | OXYGEN SATURATION: 97 %

## 2019-04-26 DIAGNOSIS — R87.613 HGSIL (HIGH GRADE SQUAMOUS INTRAEPITHELIAL LESION) ON PAP SMEAR OF CERVIX: Primary | ICD-10-CM

## 2019-04-26 DIAGNOSIS — Z01.411 ENCOUNTER FOR GYNECOLOGICAL EXAMINATION WITH ABNORMAL FINDING: ICD-10-CM

## 2019-04-26 PROCEDURE — 99999 PR OFFICE/OUTPT VISIT,PROCEDURE ONLY: CPT | Performed by: OBSTETRICS & GYNECOLOGY

## 2019-04-26 NOTE — TELEPHONE ENCOUNTER
I will be happy to work on this  In the meantime we have a surplus of samples  OK to offer pt smaples of Trelegy?

## 2019-05-01 LAB
HPV COMMENT: NORMAL
HPV TYPE 16: NOT DETECTED
HPV TYPE 18: NOT DETECTED
HPVOH (OTHER TYPES): NOT DETECTED

## 2019-05-08 ENCOUNTER — OFFICE VISIT (OUTPATIENT)
Dept: FAMILY MEDICINE CLINIC | Age: 69
End: 2019-05-08
Payer: MEDICARE

## 2019-05-08 VITALS
HEART RATE: 94 BPM | BODY MASS INDEX: 31.31 KG/M2 | OXYGEN SATURATION: 97 % | SYSTOLIC BLOOD PRESSURE: 130 MMHG | RESPIRATION RATE: 12 BRPM | WEIGHT: 155 LBS | DIASTOLIC BLOOD PRESSURE: 70 MMHG

## 2019-05-08 DIAGNOSIS — I10 ESSENTIAL HYPERTENSION: ICD-10-CM

## 2019-05-08 DIAGNOSIS — L40.1 PUSTULAR PSORIASIS: ICD-10-CM

## 2019-05-08 DIAGNOSIS — Z13.220 SCREENING FOR LIPID DISORDERS: ICD-10-CM

## 2019-05-08 DIAGNOSIS — I10 ESSENTIAL HYPERTENSION: Primary | ICD-10-CM

## 2019-05-08 DIAGNOSIS — J43.2 CENTRILOBULAR EMPHYSEMA (HCC): ICD-10-CM

## 2019-05-08 LAB
A/G RATIO: 1.4 (ref 1.1–2.2)
ALBUMIN SERPL-MCNC: 4.5 G/DL (ref 3.4–5)
ALP BLD-CCNC: 150 U/L (ref 40–129)
ALT SERPL-CCNC: 20 U/L (ref 10–40)
ANION GAP SERPL CALCULATED.3IONS-SCNC: 15 MMOL/L (ref 3–16)
AST SERPL-CCNC: 19 U/L (ref 15–37)
BILIRUB SERPL-MCNC: 0.3 MG/DL (ref 0–1)
BUN BLDV-MCNC: 10 MG/DL (ref 7–20)
CALCIUM SERPL-MCNC: 9.9 MG/DL (ref 8.3–10.6)
CHLORIDE BLD-SCNC: 100 MMOL/L (ref 99–110)
CHOLESTEROL, TOTAL: 189 MG/DL (ref 0–199)
CO2: 27 MMOL/L (ref 21–32)
CREAT SERPL-MCNC: 1.1 MG/DL (ref 0.6–1.2)
GFR AFRICAN AMERICAN: 59
GFR NON-AFRICAN AMERICAN: 49
GLOBULIN: 3.2 G/DL
GLUCOSE BLD-MCNC: 101 MG/DL (ref 70–99)
HDLC SERPL-MCNC: 66 MG/DL (ref 40–60)
LDL CHOLESTEROL CALCULATED: 100 MG/DL
POTASSIUM SERPL-SCNC: 4.7 MMOL/L (ref 3.5–5.1)
SODIUM BLD-SCNC: 142 MMOL/L (ref 136–145)
TOTAL PROTEIN: 7.7 G/DL (ref 6.4–8.2)
TRIGL SERPL-MCNC: 117 MG/DL (ref 0–150)
VLDLC SERPL CALC-MCNC: 23 MG/DL

## 2019-05-08 PROCEDURE — 3023F SPIROM DOC REV: CPT | Performed by: INTERNAL MEDICINE

## 2019-05-08 PROCEDURE — 4040F PNEUMOC VAC/ADMIN/RCVD: CPT | Performed by: INTERNAL MEDICINE

## 2019-05-08 PROCEDURE — 99214 OFFICE O/P EST MOD 30 MIN: CPT | Performed by: INTERNAL MEDICINE

## 2019-05-08 PROCEDURE — 1123F ACP DISCUSS/DSCN MKR DOCD: CPT | Performed by: INTERNAL MEDICINE

## 2019-05-08 PROCEDURE — 1090F PRES/ABSN URINE INCON ASSESS: CPT | Performed by: INTERNAL MEDICINE

## 2019-05-08 PROCEDURE — 3017F COLORECTAL CA SCREEN DOC REV: CPT | Performed by: INTERNAL MEDICINE

## 2019-05-08 PROCEDURE — G8427 DOCREV CUR MEDS BY ELIG CLIN: HCPCS | Performed by: INTERNAL MEDICINE

## 2019-05-08 PROCEDURE — G8599 NO ASA/ANTIPLAT THER USE RNG: HCPCS | Performed by: INTERNAL MEDICINE

## 2019-05-08 PROCEDURE — G8399 PT W/DXA RESULTS DOCUMENT: HCPCS | Performed by: INTERNAL MEDICINE

## 2019-05-08 PROCEDURE — G8926 SPIRO NO PERF OR DOC: HCPCS | Performed by: INTERNAL MEDICINE

## 2019-05-08 PROCEDURE — 1036F TOBACCO NON-USER: CPT | Performed by: INTERNAL MEDICINE

## 2019-05-08 PROCEDURE — G8417 CALC BMI ABV UP PARAM F/U: HCPCS | Performed by: INTERNAL MEDICINE

## 2019-05-08 RX ORDER — ALBUTEROL SULFATE 90 UG/1
2 AEROSOL, METERED RESPIRATORY (INHALATION) EVERY 6 HOURS PRN
Qty: 1 INHALER | Refills: 3 | Status: SHIPPED | OUTPATIENT
Start: 2019-05-08 | End: 2019-08-23

## 2019-05-08 ASSESSMENT — PATIENT HEALTH QUESTIONNAIRE - PHQ9
SUM OF ALL RESPONSES TO PHQ QUESTIONS 1-9: 0
2. FEELING DOWN, DEPRESSED OR HOPELESS: 0
1. LITTLE INTEREST OR PLEASURE IN DOING THINGS: 0
SUM OF ALL RESPONSES TO PHQ QUESTIONS 1-9: 0
SUM OF ALL RESPONSES TO PHQ9 QUESTIONS 1 & 2: 0

## 2019-05-08 ASSESSMENT — ENCOUNTER SYMPTOMS
CONSTIPATION: 0
WHEEZING: 0
DIARRHEA: 0
SHORTNESS OF BREATH: 0

## 2019-05-08 NOTE — PROGRESS NOTES
5/8/2019    Chief Complaint   Patient presents with    Hypertension    COPD     Saw pulm last month and gyn dr gilman  Pap repeated in aug  Says that pulm thought she was doing ok  trelegy  Working well  Working much better than advair    HTN  Just took the bp  Doing ok  No headache or dizziness    HPI    Review of Systems   Constitutional: Negative for appetite change and unexpected weight change. Respiratory: Negative for shortness of breath and wheezing. Gastrointestinal: Negative for constipation and diarrhea. Neurological: Negative for dizziness and light-headedness.        Health Maintenance   Topic Date Due    Shingles Vaccine (2 of 3) 07/20/2016    DTaP/Tdap/Td vaccine (2 - Td) 01/13/2019    Flu vaccine (Season Ended) 09/01/2019    Breast cancer screen  10/11/2019    Potassium monitoring  12/18/2019    Creatinine monitoring  12/18/2019    Lipid screen  07/26/2022    Colon cancer screen colonoscopy  09/25/2028    Pneumococcal 65+ years Vaccine  Completed    Hepatitis C screen  Completed    DEXA (modify frequency per FRAX score)  Addressed      Social History     Socioeconomic History    Marital status: Single     Spouse name: Not on file    Number of children: 2    Years of education: Not on file    Highest education level: Not on file   Occupational History    Occupation: retired      Comment: retired   Social Needs    Financial resource strain: Not on file    Food insecurity:     Worry: Not on file     Inability: Not on file   Manufacturers' Inventory needs:     Medical: Not on file     Non-medical: Not on file   Tobacco Use    Smoking status: Former Smoker     Packs/day: 0.50     Years: 11.00     Pack years: 5.50     Types: Cigarettes     Start date: 1/1/1966     Last attempt to quit: 8/3/2015     Years since quitting: 3.7    Smokeless tobacco: Never Used    Tobacco comment: counseled on tobacco exposure avoidance   Substance and Sexual Activity    Alcohol use: No     Alcohol/week: 0.0 oz    Drug use: No    Sexual activity: Never     Partners: Male   Lifestyle    Physical activity:     Days per week: Not on file     Minutes per session: Not on file    Stress: Not on file   Relationships    Social connections:     Talks on phone: Not on file     Gets together: Not on file     Attends Holiness service: Not on file     Active member of club or organization: Not on file     Attends meetings of clubs or organizations: Not on file     Relationship status: Not on file    Intimate partner violence:     Fear of current or ex partner: Not on file     Emotionally abused: Not on file     Physically abused: Not on file     Forced sexual activity: Not on file   Other Topics Concern    Not on file   Social History Narrative    Not on file        Family History   Problem Relation Age of Onset    Other Father         Tuberculosis in his 34\"s in [address] Diabetes Mother     Coronary Art Dis Mother     Diabetes Sister     High Blood Pressure Sister     Coronary Art Dis Sister      Prior to Visit Medications    Medication Sig Taking?  Authorizing Provider   furosemide (LASIX) 20 MG tablet TAKE 1 TABLET BY MOUTH EVERY DAY Yes Bernadine Mcginnis MD   amLODIPine (NORVASC) 10 MG tablet TAKE 1 TABLET BY MOUTH EVERY DAY Yes Bernadine Mcginnis MD   Fluticasone-Umeclidin-Vilant (TRELEGY ELLIPTA) 291-70.3-16 MCG/INH AEPB Inhale 1 Inhaler into the lungs daily Yes Bernadine Mcginnis MD   Naproxen Sodium (ALEVE) 220 MG CAPS Take 1 tablet by mouth as needed for Pain Yes Historical Provider, MD   albuterol sulfate HFA (PROAIR HFA) 108 (90 Base) MCG/ACT inhaler Inhale 2 puffs into the lungs every 6 hours as needed for Wheezing  Patient taking differently: Inhale 2 puffs into the lungs every 6 hours as needed for Wheezing  Yes Bernadine Mcginnis MD   Respiratory Therapy Supplies (NEBULIZER/TUBING/MOUTHPIECE) KIT 1 kit by Does not apply route daily Yes Ariela Stevens MD   ipratropium-albuterol (DUONEB) 0.5-2.5 (3) MG/3ML SOLN nebulizer solution Inhale 3 mLs into the lungs every 4 hours  Patient taking differently: Inhale 1 vial into the lungs every 4 hours as needed  Yes Galindo Uribe MD     Patient Active Problem List   Diagnosis    COPD (chronic obstructive pulmonary disease)--pfts show severe obst defect w severe decrease dlco  +bronchodilator response 6/14--repeat unchanged    Tobacco abuse-pt quit 7/15--cold turkey    Pulmonary nodule, left- on lung cancer screening program-  pet scan 6/16=unchanged billy nodule-little activity lung--pos activoity ge jct (still need to cont puln nod surv x 2 yrs)    Dependent edema--echo 10/15--nl ef--mild diast dysf--advised low salt diet/ stockings/ wt loss    Essential hypertension    Diastolic dysfunction--mild on echo 10/15--nl ef    Pustular psoriasis, Dr. Ashley Estrada    Centrilobular emphysema [facility])    Mediastinal adenopathy    SIRS (systemic inflammatory response syndrome) (HCC)    Leukocytosis    Ex-smoker    Tobacco abuse counseling    Elevated brain natriuretic peptide (BNP) level    Enlarged lymph node enlarged ing ly node 2018    Inguinal adenopathy work up  2018 Dr. Caitlin Haley.  neg path     Edema of right lower extremity aug/sept  2018    Coronary artery calcification    S/P colonoscopy 9/18 [place] - 8mm polyp    HGSIL (high grade squamous intraepithelial lesion) on Pap smear of cervix        LABS:   Lab Results   Component Value Date    GLUCOSE 91 12/18/2018     Lab Results   Component Value Date     12/18/2018    K 4.0 12/18/2018    CREATININE 1.1 12/18/2018     Cholesterol, Total   Date Value Ref Range Status   07/26/2017 166 0 - 199 mg/dL Final     LDL Calculated   Date Value Ref Range Status   07/26/2017 66 <100 mg/dL Final     HDL   Date Value Ref Range Status   07/26/2017 85 (H) 40 - 60 mg/dL Final     Triglycerides   Date Value Ref Range Status   07/26/2017 73 0 - 150 mg/dL Final     Lab Results   Component Value Date    ALT 26 09/11/2018    AST 28 09/11/2018    ALKPHOS 110 09/11/2018    BILITOT 0.3 09/11/2018      Lab Results   Component Value Date    WBC 9.3 09/11/2018    HGB 12.9 09/11/2018    HCT 39.6 09/11/2018    MCV 83.6 09/11/2018     09/11/2018     No results found for: TSH  Lab Results   Component Value Date    LABA1C 5.0 03/12/2018     No results found for: PSA, PSADIA     PHYSICAL EXAM:  /70 (Site: Left Upper Arm, Position: Sitting, Cuff Size: Medium Adult)   Pulse 94   Resp 12   Wt 155 lb (70.3 kg)   LMP  (LMP Unknown)   SpO2 97%   BMI 31.31 kg/m²    Physical Exam   Constitutional: She appears well-developed and well-nourished. HENT:   Head: Normocephalic and atraumatic. Cardiovascular: Normal rate and regular rhythm. No murmur heard. Pulmonary/Chest: Effort normal. She has wheezes (occasional wheezing). Skin: Skin is warm and dry. Rash noted. Pink rash on the tibias bilat  Much worse on the right leg  Right leg little more swollen than the left leg  No calf tenderness bilat    Psychiatric: She has a normal mood and affect. Her behavior is normal. Judgment and thought content normal.     BP Readings from Last 5 Encounters:   05/08/19 130/70   04/26/19 (!) 147/76   04/10/19 138/68   01/25/19 (!) 170/76   01/03/19 (!) 84/46       Wt Readings from Last 5 Encounters:   05/08/19 155 lb (70.3 kg)   04/26/19 157 lb (71.2 kg)   04/10/19 156 lb (70.8 kg)   01/25/19 160 lb 12.8 oz (72.9 kg)   01/03/19 157 lb 13.6 oz (71.6 kg)        ASSESSMENT/PLAN:    Jesús Castro was seen today for hypertension and copd.     Diagnoses and all orders for this visit:    Essential hypertension    Centrilobular emphysema (HCC)    Pustular psoriasis, Dr. Edmond Pabon    samples of trelegy given  Advised her to see Dr. Edmond Pabon again as her psoriasis is worse  bp was good today    Fu 4m

## 2019-05-09 DIAGNOSIS — R74.8 ELEVATED ALKALINE PHOSPHATASE LEVEL: Primary | ICD-10-CM

## 2019-06-21 DIAGNOSIS — R74.8 ELEVATED ALKALINE PHOSPHATASE LEVEL: ICD-10-CM

## 2019-06-21 LAB
ALBUMIN SERPL-MCNC: 4.3 G/DL (ref 3.4–5)
ALP BLD-CCNC: 125 U/L (ref 40–129)
ALT SERPL-CCNC: 20 U/L (ref 10–40)
AST SERPL-CCNC: 17 U/L (ref 15–37)
BILIRUB SERPL-MCNC: 0.3 MG/DL (ref 0–1)
BILIRUBIN DIRECT: <0.2 MG/DL (ref 0–0.3)
BILIRUBIN, INDIRECT: NORMAL MG/DL (ref 0–1)
TOTAL PROTEIN: 7.3 G/DL (ref 6.4–8.2)

## 2019-06-22 DIAGNOSIS — R94.4 DECREASED GFR: Primary | ICD-10-CM

## 2019-07-01 RX ORDER — AMLODIPINE BESYLATE 10 MG/1
TABLET ORAL
Qty: 90 TABLET | Refills: 1 | Status: SHIPPED | OUTPATIENT
Start: 2019-07-01 | End: 2019-12-12 | Stop reason: SDUPTHER

## 2019-07-01 RX ORDER — FUROSEMIDE 20 MG/1
TABLET ORAL
Qty: 90 TABLET | Refills: 0 | Status: SHIPPED | OUTPATIENT
Start: 2019-07-01 | End: 2019-09-30 | Stop reason: SDUPTHER

## 2019-07-02 NOTE — PROGRESS NOTES
[address] [facility] time___0700_________        Surgery time____0830________    Take the following medications with a sip of water:  Inhaler, amlodipine     Do not eat or drink anything after 12:00 midnight prior to your surgery. This includes water chewing gum, mints and ice chips. You may brush your teeth and gargle the morning of your surgery, but do not swallow the water     Please see your family doctor/pediatrician for a history and physical and/or concerning medications. H&P 7/15/2019 Aquilino Darryn   Bring any test results/reports from your physicians office. If you are under the care of a heart doctor or specialist doctor, please be aware that you may be asked to them for clearance    You may be asked to stop blood thinners such as Coumadin, Plavix, Fragmin, Lovenox, etc., or any anti-inflammatories such as:  Aspirin, Ibuprofen, Advil, Naproxen prior to your surgery. We also ask that you stop any OTC medications such as fish oil, vitamin E, glucosamine, garlic, Multivitamins, COQ 10, etc.    We ask that you do not smoke 24 hours prior to surgery  We ask that you do not  drink any alcoholic beverages 24 hours prior to surgery     You must make arrangements for a responsible adult to take you home after your surgery. For your safety you will not be allowed to leave alone or drive yourself home. Your surgery will be cancelled if you do not have a ride home. Also for your safety, it is strongly suggested that someone stay with you the first 24 hours after your surgery. A parent or legal guardian must accompany a child scheduled for surgery and plan to stay at the hospital until the child is discharged. Please do not bring other children with you. For your comfort, please wear simple loose fitting clothing to the hospital.  Please do not bring valuables. Wear short sleeve button down shirt or loose fitting.  Bring eye drops   Do not wear any make-up or nail polish on your fingers or toes      For your safety, please do not wear any jewelry or body piercing's on the day of surgery. All jewelry must be removed. If you have dentures, they will be removed before going to operating room. For your convenience, we will provide you with a container. If you wear contact lenses or glasses, they will be removed, please bring a case for them. If you have a living will and a durable power of  for healthcare, please bring in a copy. As part of our patient safety program to minimize surgical site infections, we ask you to do the following:    · Please notify your surgeon if you develop any illness between         now and the  day of your surgery. · This includes a cough, cold, fever, sore throat, nausea,         or vomiting, and diarrhea, etc.  ·  Please notify your surgeon if you experience dizziness, shortness         of breath or blurred vision between now and the time of your surgery. Do not shave your operative site 96 hours prior to surgery. For face and neck surgery, men may use an electric razor 48 hours   prior to surgery. You may shower the night before surgery or the morning of   your surgery with an antibacterial soap. You will need to bring a photo ID and insurance card    [facility] has an onsite pharmacy, would you like to utilize our pharmacy     If you will be staying overnight and use a C-pap machine, please bring   your C-pap to hospital     Our goal is to provide you with excellent care, therefore, visitors will be limited to two(2) in the room at a time so that we may focus on providing this care for you. Please contact pre-admission testing if you have any further questions. [facility] phone number:  [phone]    Please note these are generalized instructions for all surgical cases, you may be provided with more specific instructions according to your surgery.

## 2019-07-11 DIAGNOSIS — R94.4 DECREASED GFR: ICD-10-CM

## 2019-07-11 LAB
ALBUMIN SERPL-MCNC: 4.8 G/DL (ref 3.4–5)
ANION GAP SERPL CALCULATED.3IONS-SCNC: 17 MMOL/L (ref 3–16)
BUN BLDV-MCNC: 12 MG/DL (ref 7–20)
CALCIUM SERPL-MCNC: 10.1 MG/DL (ref 8.3–10.6)
CHLORIDE BLD-SCNC: 102 MMOL/L (ref 99–110)
CO2: 26 MMOL/L (ref 21–32)
CREAT SERPL-MCNC: 1 MG/DL (ref 0.6–1.2)
GFR AFRICAN AMERICAN: >60
GFR NON-AFRICAN AMERICAN: 55
GLUCOSE BLD-MCNC: 104 MG/DL (ref 70–99)
PHOSPHORUS: 3.7 MG/DL (ref 2.5–4.9)
POTASSIUM SERPL-SCNC: 4.9 MMOL/L (ref 3.5–5.1)
SODIUM BLD-SCNC: 145 MMOL/L (ref 136–145)

## 2019-07-15 ENCOUNTER — OFFICE VISIT (OUTPATIENT)
Dept: FAMILY MEDICINE CLINIC | Age: 69
End: 2019-07-15
Payer: MEDICARE

## 2019-07-15 VITALS
BODY MASS INDEX: 30.84 KG/M2 | WEIGHT: 153 LBS | DIASTOLIC BLOOD PRESSURE: 78 MMHG | HEIGHT: 59 IN | RESPIRATION RATE: 17 BRPM | SYSTOLIC BLOOD PRESSURE: 132 MMHG | OXYGEN SATURATION: 98 % | TEMPERATURE: 98 F | HEART RATE: 103 BPM

## 2019-07-15 DIAGNOSIS — I25.84 CORONARY ARTERY CALCIFICATION: ICD-10-CM

## 2019-07-15 DIAGNOSIS — I51.89 OTHER ILL-DEFINED HEART DISEASES: ICD-10-CM

## 2019-07-15 DIAGNOSIS — I51.89 DIASTOLIC DYSFUNCTION: ICD-10-CM

## 2019-07-15 DIAGNOSIS — I10 ESSENTIAL HYPERTENSION: ICD-10-CM

## 2019-07-15 DIAGNOSIS — I25.10 CORONARY ARTERY CALCIFICATION: ICD-10-CM

## 2019-07-15 DIAGNOSIS — J43.2 CENTRILOBULAR EMPHYSEMA (HCC): ICD-10-CM

## 2019-07-15 DIAGNOSIS — Z01.818 PREOP EXAMINATION: Primary | ICD-10-CM

## 2019-07-15 DIAGNOSIS — R60.9 DEPENDENT EDEMA: ICD-10-CM

## 2019-07-15 PROCEDURE — G8926 SPIRO NO PERF OR DOC: HCPCS | Performed by: INTERNAL MEDICINE

## 2019-07-15 PROCEDURE — 1090F PRES/ABSN URINE INCON ASSESS: CPT | Performed by: INTERNAL MEDICINE

## 2019-07-15 PROCEDURE — 93000 ELECTROCARDIOGRAM COMPLETE: CPT | Performed by: INTERNAL MEDICINE

## 2019-07-15 PROCEDURE — 3023F SPIROM DOC REV: CPT | Performed by: INTERNAL MEDICINE

## 2019-07-15 PROCEDURE — G8427 DOCREV CUR MEDS BY ELIG CLIN: HCPCS | Performed by: INTERNAL MEDICINE

## 2019-07-15 PROCEDURE — 99213 OFFICE O/P EST LOW 20 MIN: CPT | Performed by: INTERNAL MEDICINE

## 2019-07-15 PROCEDURE — G8417 CALC BMI ABV UP PARAM F/U: HCPCS | Performed by: INTERNAL MEDICINE

## 2019-07-15 RX ORDER — PREDNISOLONE ACETATE 10 MG/ML
1 SUSPENSION/ DROPS OPHTHALMIC 4 TIMES DAILY
COMMUNITY
End: 2019-09-09

## 2019-07-15 RX ORDER — OFLOXACIN 3 MG/ML
3 SOLUTION/ DROPS OPHTHALMIC 4 TIMES DAILY
COMMUNITY
End: 2019-09-09

## 2019-07-15 RX ORDER — BROMFENAC SODIUM 0.7 MG/ML
SOLUTION/ DROPS OPHTHALMIC
Refills: 1 | COMMUNITY
Start: 2019-06-17 | End: 2019-09-09

## 2019-07-15 ASSESSMENT — ENCOUNTER SYMPTOMS
SHORTNESS OF BREATH: 0
DIARRHEA: 0
CONSTIPATION: 0
EYE PAIN: 0
VOMITING: 0
COLOR CHANGE: 0
WHEEZING: 0
NAUSEA: 0

## 2019-07-15 NOTE — PROGRESS NOTES
PRE-OP HISTORYAND PHYSICAL    7/15/2019    Rupa Lee is a 77 y.o. female who presents to the office today for apreoperative consultation. Chief Complaint   Patient presents with    Pre-op Exam     Cataract surgery right eye 7-18-19 left eye 8-1-19 fax [phone]     Will be having cataract surgery on the right eye initially and then the left eye. No problems with anesthesia. No problems bleeding. No heart disease. In  Jan 2018 per hospital notes  Had CHF and mild pulm edema  But had a a pos influenza at that time. I had ordered a stress test and echo back in  Dec  Had  A stress test ordered in  Sept 18   These test were never done. Had coronary calcification and a stress test was ordered that she never did.         Past Medical History:   Diagnosis Date    COPD (chronic obstructive pulmonary disease) (HCC)     Glaucoma     Hx of blood clots     small-right leg    Hypertension      Past Surgical History:   Procedure Laterality Date    CERVIX BIOPSY N/A 1/3/2019    COLD KNIFE CONIZATION, ENDOCERVICAL CURETTAGE performed by Rola Santillan MD [facility]  09/20/2018    Dr. Leopoldo Dubois    LEEP  10/25/2018     LEEP, ecc    IL BIOPSY/EXCISION, LYMPH NODE(S) Right 10/1/2018    EXCISIONAL BRIOPSY OF RIGHT GROIN LYMPH NODE performed by Debo Adames MD at [facility] N/A 10/25/2018    LOOP ELECTROSURGICAL EXCISION PROCEDURE performed by Rola Santillan MD at [address] [facility]      Dr. Leopoldo Dubois     Family History   Problem Relation Age of Onset    Other Father         Tuberculosis in his 34\"s in [address] Diabetes Mother     Coronary Art Dis Mother     Diabetes Sister     High Blood Pressure Sister     Coronary Art Dis Sister      Social History     Socioeconomic History    Marital status: Single     Spouse name: None    Number of children: 2    Years of education: None    Highest education level: None   Occupational History    Occupation: retired      Comment: retired   Social Needs    Financial resource strain: None    Food insecurity:     Worry: None     Inability: None    Transportation needs:     Medical: None     Non-medical: None   Tobacco Use    Smoking status: Former Smoker     Packs/day: 0.50     Years: 11.00     Pack years: 5.50     Types: Cigarettes     Start date: 1/1/1966     Last attempt to quit: 8/3/2015     Years since quitting: 3.9    Smokeless tobacco: Never Used    Tobacco comment: counseled on tobacco exposure avoidance   Substance and Sexual Activity    Alcohol use: No     Alcohol/week: 0.0 standard drinks    Drug use: No    Sexual activity: Never     Partners: Male   Lifestyle    Physical activity:     Days per week: None     Minutes per session: None    Stress: None   Relationships    Social connections:     Talks on phone: None     Gets together: None     Attends Worship service: None     Active member of club or organization: None     Attends meetings of clubs or organizations: None     Relationship status: None    Intimate partner violence:     Fear of current or ex partner: None     Emotionally abused: None     Physically abused: None     Forced sexual activity: None   Other Topics Concern    None   Social History Narrative    None     Prior to Visit Medications    Medication Sig Taking?  Authorizing Provider   PROLENSA 0.07 % SOLN PLACE ONE DROP IN OPERATIVE EYE IN THE MORNING AND EVENING THE DAY BEFORE SURGERY, THE MORNING OF SURGERY AND EVERY MORNING FOR 4 WEEKS AFTE Yes Historical Provider, MD   ofloxacin (OCUFLOX) 0.3 % solution 3 drops 4 times daily Yes Historical Provider, MD   prednisoLONE acetate (PRED FORTE) 1 % ophthalmic suspension 1 drop 4 times daily Yes Historical Provider, MD   furosemide (LASIX) 20 MG tablet TAKE 1 TABLET BY MOUTH EVERY DAY Yes Manny Fernandez MD   amLODIPine (NORVASC) 10 MG tablet TAKE 1 TABLET BY MOUTH EVERY DAY Yes Manny Fernandez MD 155 lb (70.3 kg)   04/26/19 157 lb (71.2 kg)     BP Readings from Last 3 Encounters:   07/15/19 132/78   05/08/19 130/70   04/26/19 (!) 147/76       Physical Exam   Constitutional: She appears well-developed and well-nourished. HENT:   Head: Normocephalic and atraumatic. Left Ear: External ear normal.   Right ear with wax   Eyes: Conjunctivae and EOM are normal. No scleral icterus. Neck: Neck supple. No thyromegaly present. No carotid bruits bilat   Cardiovascular: Normal rate and regular rhythm. No murmur heard. Pulmonary/Chest: Breath sounds normal. No respiratory distress. She has no wheezes. Abdominal: Soft. She exhibits no distension and no mass. There is no tenderness. Musculoskeletal: She exhibits no edema or deformity. Lymphadenopathy:     She has no cervical adenopathy. Neurological: She is alert. She exhibits normal muscle tone. Motor  5/5 upper and lower ext bilat  EOMI  Smile symmetrical  Speech clear   Skin: Skin is warm and dry. No rash noted. bilat shins   Left shin with rash  Right shin diffuse eryethema   Swelling right lower ext   2-3 plus   Psychiatric: She has a normal mood and affect. Her behavior is normal. Judgment and thought content normal.   Jacinda Wilkins was seen today for pre-op exam.    Diagnoses and all orders for this visit:    Preop examination  -     (Gxt) Stress Test Exercise W Out Myoview; Future    Centrilobular emphysema (HCC)    Essential hypertension  -     EKG 12 Lead    Diastolic dysfunction--mild on echo 10/15--nl ef    Dependent edema--echo 10/15--nl ef--mild diast dysf--advised low salt diet/ stockings/ wt loss    Coronary artery calcification  -     Cancel: EKG 12 Lead; Future  -     EKG 12 Lead    Other ill-defined heart diseases   -     (Gxt) Stress Test Exercise W Out Myoview;  Future      EKG   nsr rate  93  AL and QTC are ok  Has slight t inversion in  V2  No significant change prior ekg    Will reorder a stress test.  This was ordered almost a year ago

## 2019-07-16 ENCOUNTER — HOSPITAL ENCOUNTER (OUTPATIENT)
Dept: NON INVASIVE DIAGNOSTICS | Age: 69
Discharge: HOME | End: 2019-07-16
Payer: MEDICARE

## 2019-07-16 ENCOUNTER — TELEPHONE (OUTPATIENT)
Dept: FAMILY MEDICINE CLINIC | Age: 69
End: 2019-07-16

## 2019-07-16 DIAGNOSIS — Z01.818 PREOP EXAMINATION: ICD-10-CM

## 2019-07-16 DIAGNOSIS — I51.89 OTHER ILL-DEFINED HEART DISEASES: ICD-10-CM

## 2019-07-16 PROCEDURE — 93017 CV STRESS TEST TRACING ONLY: CPT

## 2019-07-17 ENCOUNTER — ANESTHESIA EVENT (OUTPATIENT)
Dept: SURGERY | Age: 69
End: 2019-07-17
Payer: MEDICARE

## 2019-07-18 ENCOUNTER — PREP FOR PROCEDURE (OUTPATIENT)
Dept: OPHTHALMOLOGY | Age: 69
End: 2019-07-18

## 2019-07-18 ENCOUNTER — ANESTHESIA (OUTPATIENT)
Dept: SURGERY | Age: 69
End: 2019-07-18
Payer: MEDICARE

## 2019-07-18 ENCOUNTER — HOSPITAL ENCOUNTER (OUTPATIENT)
Age: 69
Setting detail: OUTPATIENT SURGERY
Discharge: HOME | End: 2019-07-18
Attending: OPHTHALMOLOGY | Admitting: OPHTHALMOLOGY
Payer: MEDICARE

## 2019-07-18 ENCOUNTER — CARE COORDINATION (OUTPATIENT)
Dept: CARE COORDINATION | Age: 69
End: 2019-07-18

## 2019-07-18 VITALS
SYSTOLIC BLOOD PRESSURE: 116 MMHG | RESPIRATION RATE: 20 BRPM | DIASTOLIC BLOOD PRESSURE: 62 MMHG | OXYGEN SATURATION: 100 %

## 2019-07-18 VITALS
WEIGHT: 154 LBS | HEART RATE: 78 BPM | RESPIRATION RATE: 20 BRPM | HEIGHT: 59 IN | BODY MASS INDEX: 31.04 KG/M2 | DIASTOLIC BLOOD PRESSURE: 71 MMHG | TEMPERATURE: 97 F | SYSTOLIC BLOOD PRESSURE: 140 MMHG | OXYGEN SATURATION: 98 %

## 2019-07-18 PROCEDURE — V2632 POST CHMBR INTRAOCULAR LENS: HCPCS | Performed by: OPHTHALMOLOGY

## 2019-07-18 PROCEDURE — 3600000004 HC SURGERY LEVEL 4 BASE: Performed by: OPHTHALMOLOGY

## 2019-07-18 PROCEDURE — 2709999900 HC NON-CHARGEABLE SUPPLY: Performed by: OPHTHALMOLOGY

## 2019-07-18 PROCEDURE — 6360000002 HC RX W HCPCS: Performed by: NURSE ANESTHETIST, CERTIFIED REGISTERED

## 2019-07-18 PROCEDURE — 3700000000 HC ANESTHESIA ATTENDED CARE: Performed by: OPHTHALMOLOGY

## 2019-07-18 PROCEDURE — 2580000003 HC RX 258: Performed by: NURSE ANESTHETIST, CERTIFIED REGISTERED

## 2019-07-18 PROCEDURE — 3700000001 HC ADD 15 MINUTES (ANESTHESIA): Performed by: OPHTHALMOLOGY

## 2019-07-18 PROCEDURE — 2580000003 HC RX 258: Performed by: ANESTHESIOLOGY

## 2019-07-18 PROCEDURE — 7100000010 HC PHASE II RECOVERY - FIRST 15 MIN: Performed by: OPHTHALMOLOGY

## 2019-07-18 PROCEDURE — 6370000000 HC RX 637 (ALT 250 FOR IP): Performed by: OPHTHALMOLOGY

## 2019-07-18 PROCEDURE — 2500000003 HC RX 250 WO HCPCS: Performed by: OPHTHALMOLOGY

## 2019-07-18 PROCEDURE — 3600000014 HC SURGERY LEVEL 4 ADDTL 15MIN: Performed by: OPHTHALMOLOGY

## 2019-07-18 DEVICE — LENS INTOCU +24.5 DIOPT L13MM DIA6MM 0DEG HAPTIC ANG A: Type: IMPLANTABLE DEVICE | Site: EYE | Status: FUNCTIONAL

## 2019-07-18 RX ORDER — SODIUM CHLORIDE 0.9 % (FLUSH) 0.9 %
10 SYRINGE (ML) INJECTION PRN
Status: DISCONTINUED | OUTPATIENT
Start: 2019-07-18 | End: 2019-07-18 | Stop reason: SDUPTHER

## 2019-07-18 RX ORDER — SODIUM CHLORIDE 0.9 % (FLUSH) 0.9 %
10 SYRINGE (ML) INJECTION EVERY 12 HOURS SCHEDULED
Status: CANCELLED | OUTPATIENT
Start: 2019-07-18

## 2019-07-18 RX ORDER — SODIUM CHLORIDE 0.9 % (FLUSH) 0.9 %
10 SYRINGE (ML) INJECTION EVERY 12 HOURS SCHEDULED
Status: DISCONTINUED | OUTPATIENT
Start: 2019-07-18 | End: 2019-07-18 | Stop reason: SDUPTHER

## 2019-07-18 RX ORDER — SODIUM CHLORIDE 9 MG/ML
INJECTION, SOLUTION INTRAVENOUS CONTINUOUS PRN
Status: DISCONTINUED | OUTPATIENT
Start: 2019-07-18 | End: 2019-07-18 | Stop reason: SDUPTHER

## 2019-07-18 RX ORDER — TETRACAINE HYDROCHLORIDE 5 MG/ML
1 SOLUTION OPHTHALMIC ONCE
Status: CANCELLED | OUTPATIENT
Start: 2019-07-18 | End: 2019-07-18

## 2019-07-18 RX ORDER — BALANCED SALT SOLUTION 6.4; .75; .48; .3; 3.9; 1.7 MG/ML; MG/ML; MG/ML; MG/ML; MG/ML; MG/ML
SOLUTION OPHTHALMIC
Status: COMPLETED | OUTPATIENT
Start: 2019-07-18 | End: 2019-07-18

## 2019-07-18 RX ORDER — TETRACAINE HYDROCHLORIDE 5 MG/ML
1 SOLUTION OPHTHALMIC ONCE
Status: COMPLETED | OUTPATIENT
Start: 2019-07-18 | End: 2019-07-18

## 2019-07-18 RX ORDER — BRIMONIDINE TARTRATE 2 MG/ML
SOLUTION/ DROPS OPHTHALMIC
Status: COMPLETED | OUTPATIENT
Start: 2019-07-18 | End: 2019-07-18

## 2019-07-18 RX ORDER — CIPROFLOXACIN HYDROCHLORIDE 3.5 MG/ML
1 SOLUTION/ DROPS TOPICAL SEE ADMIN INSTRUCTIONS
Status: CANCELLED | OUTPATIENT
Start: 2019-07-18

## 2019-07-18 RX ORDER — SODIUM CHLORIDE 0.9 % (FLUSH) 0.9 %
10 SYRINGE (ML) INJECTION PRN
Status: DISCONTINUED | OUTPATIENT
Start: 2019-07-18 | End: 2019-07-18 | Stop reason: HOSPADM

## 2019-07-18 RX ORDER — SODIUM CHLORIDE 0.9 % (FLUSH) 0.9 %
10 SYRINGE (ML) INJECTION EVERY 12 HOURS SCHEDULED
Status: DISCONTINUED | OUTPATIENT
Start: 2019-07-18 | End: 2019-07-18 | Stop reason: HOSPADM

## 2019-07-18 RX ORDER — LIDOCAINE HYDROCHLORIDE 10 MG/ML
INJECTION, SOLUTION EPIDURAL; INFILTRATION; INTRACAUDAL; PERINEURAL
Status: COMPLETED | OUTPATIENT
Start: 2019-07-18 | End: 2019-07-18

## 2019-07-18 RX ORDER — TETRACAINE HYDROCHLORIDE 5 MG/ML
SOLUTION OPHTHALMIC
Status: COMPLETED | OUTPATIENT
Start: 2019-07-18 | End: 2019-07-18

## 2019-07-18 RX ORDER — SODIUM CHLORIDE 0.9 % (FLUSH) 0.9 %
10 SYRINGE (ML) INJECTION PRN
Status: CANCELLED | OUTPATIENT
Start: 2019-07-18

## 2019-07-18 RX ORDER — MIDAZOLAM HYDROCHLORIDE 1 MG/ML
INJECTION INTRAMUSCULAR; INTRAVENOUS PRN
Status: DISCONTINUED | OUTPATIENT
Start: 2019-07-18 | End: 2019-07-18 | Stop reason: SDUPTHER

## 2019-07-18 RX ORDER — SODIUM CHLORIDE 9 MG/ML
INJECTION, SOLUTION INTRAVENOUS CONTINUOUS
Status: DISCONTINUED | OUTPATIENT
Start: 2019-07-18 | End: 2019-07-18 | Stop reason: HOSPADM

## 2019-07-18 RX ORDER — CIPROFLOXACIN HYDROCHLORIDE 3.5 MG/ML
1 SOLUTION/ DROPS TOPICAL SEE ADMIN INSTRUCTIONS
Status: COMPLETED | OUTPATIENT
Start: 2019-07-18 | End: 2019-07-18

## 2019-07-18 RX ADMIN — SODIUM CHLORIDE: 9 INJECTION, SOLUTION INTRAVENOUS at 08:11

## 2019-07-18 RX ADMIN — POVIDONE-IODINE: 5 SOLUTION OPHTHALMIC at 07:27

## 2019-07-18 RX ADMIN — SODIUM CHLORIDE: 0.9 INJECTION, SOLUTION INTRAVENOUS at 07:23

## 2019-07-18 RX ADMIN — MIDAZOLAM 2 MG: 1 INJECTION INTRAMUSCULAR; INTRAVENOUS at 08:11

## 2019-07-18 RX ADMIN — Medication 3 ML: at 07:27

## 2019-07-18 RX ADMIN — TETRACAINE HYDROCHLORIDE 1 DROP: 5 SOLUTION OPHTHALMIC at 07:22

## 2019-07-18 RX ADMIN — CIPROFLOXACIN 1 DROP: 3 SOLUTION OPHTHALMIC at 07:27

## 2019-07-18 RX ADMIN — Medication 3 ML: at 07:22

## 2019-07-18 RX ADMIN — CIPROFLOXACIN 1 DROP: 3 SOLUTION OPHTHALMIC at 07:22

## 2019-07-18 ASSESSMENT — LIFESTYLE VARIABLES: SMOKING_STATUS: 0

## 2019-07-18 ASSESSMENT — PAIN SCALES - GENERAL
PAINLEVEL_OUTOF10: 0
PAINLEVEL_OUTOF10: 0

## 2019-07-18 ASSESSMENT — PAIN - FUNCTIONAL ASSESSMENT: PAIN_FUNCTIONAL_ASSESSMENT: 0-10

## 2019-07-18 NOTE — ANESTHESIA PRE PROCEDURE
Department of Anesthesiology  Preprocedure Note       Name:  Renold Dakin   Age:  77 y.o.  :  1950                                          MRN:  [id number]         Date:  2019      Surgeon: Conner Ashley):  Yanelis Avila MD    Procedure: Procedure(s):  Right eye cataract    Medications prior to admission:   Prior to Admission medications    Medication Sig Start Date End Date Taking?  Authorizing Provider   PROLENSA 0.07 % SOLN PLACE ONE DROP IN OPERATIVE EYE IN THE MORNING AND EVENING THE DAY BEFORE SURGERY, THE MORNING OF SURGERY AND EVERY MORNING FOR 4 WEEKS AFTE 19  Yes Historical Provider, MD   ofloxacin (OCUFLOX) 0.3 % solution 3 drops 4 times daily   Yes Historical Provider, MD   prednisoLONE acetate (PRED FORTE) 1 % ophthalmic suspension 1 drop 4 times daily   Yes Historical Provider, MD   furosemide (LASIX) 20 MG tablet TAKE 1 TABLET BY MOUTH EVERY DAY 19  Yes Mitchel Perez MD   amLODIPine (NORVASC) 10 MG tablet TAKE 1 TABLET BY MOUTH EVERY DAY 19  Yes Mitchel Perez MD   albuterol sulfate HFA (PROAIR HFA) 108 (90 Base) MCG/ACT inhaler Inhale 2 puffs into the lungs every 6 hours as needed for Wheezing 19  Yes Mitchel Perez MD   Fluticasone-Umeclidin-Vilant (TRELEGY ELLIPTA) 100-62.5-25 MCG/INH AEPB Inhale 1 Inhaler into the lungs daily 18  Yes Mitchel Perez MD   Naproxen Sodium (ALEVE) 220 MG CAPS Take 1 tablet by mouth as needed for Pain   Yes Historical Provider, MD   Respiratory Therapy Supplies (NEBULIZER/TUBING/MOUTHPIECE) KIT 1 kit by Does not apply route daily 18  Yes Jah Fenton MD   ipratropium-albuterol (DUONEB) 0.5-2.5 (3) MG/3ML SOLN nebulizer solution Inhale 3 mLs into the lungs every 4 hours  Patient taking differently: Inhale 1 vial into the lungs every 4 hours as needed  18  Yes Jah Fenton MD       Current medications:    Current Facility-Administered Medications   Medication Dose Route Frequency Provider Last Rate

## 2019-07-30 NOTE — PROGRESS NOTES
[address] [facility]  [phone]        Pre-Op Phone Call:     Patient Name: Zeus Lee     Telephone Information:   Mobile [phone]     Home phone:  [phone]    Surgery Time:    8:10 AM     Arrival Time:  0577     Left extended Message:  NA     Message left with:     Recent change in health status:  No     Advised of transportation/ policy:  Yes     NPO policy reviewed:  Yes pt     Advised to take morning heart/blood pressure medications with sips of water morning of surgery? Yes     Instructed to bring eye drops, photo identification, and insurance card day of surgery? Yes     Advised to wear short sleeved button down shirt (no T-shirt underneath): Yes     Advised not to wear jewelry, hairpins, or pantyhose day of surgery? Yes     Advised not to wear make-up and to wash face day of surgery?   Yes    Remarks:        Electronically signed by:  Perri Treadwell RN at 7/30/2019 2:52 PM
according to your surgery.

## 2019-07-31 ENCOUNTER — ANESTHESIA EVENT (OUTPATIENT)
Dept: SURGERY | Age: 69
End: 2019-07-31
Payer: MEDICARE

## 2019-07-31 ENCOUNTER — PREP FOR PROCEDURE (OUTPATIENT)
Dept: OPHTHALMOLOGY | Age: 69
End: 2019-07-31

## 2019-07-31 RX ORDER — SODIUM CHLORIDE 0.9 % (FLUSH) 0.9 %
10 SYRINGE (ML) INJECTION EVERY 12 HOURS SCHEDULED
Status: CANCELLED | OUTPATIENT
Start: 2019-07-31

## 2019-07-31 RX ORDER — SODIUM CHLORIDE 0.9 % (FLUSH) 0.9 %
10 SYRINGE (ML) INJECTION PRN
Status: CANCELLED | OUTPATIENT
Start: 2019-07-31

## 2019-07-31 RX ORDER — CIPROFLOXACIN HYDROCHLORIDE 3.5 MG/ML
1 SOLUTION/ DROPS TOPICAL SEE ADMIN INSTRUCTIONS
Status: CANCELLED | OUTPATIENT
Start: 2019-07-31

## 2019-07-31 RX ORDER — TETRACAINE HYDROCHLORIDE 5 MG/ML
1 SOLUTION OPHTHALMIC ONCE
Status: CANCELLED | OUTPATIENT
Start: 2019-07-31 | End: 2019-07-31

## 2019-08-01 ENCOUNTER — HOSPITAL ENCOUNTER (OUTPATIENT)
Age: 69
Setting detail: OUTPATIENT SURGERY
Discharge: HOME | End: 2019-08-01
Attending: OPHTHALMOLOGY | Admitting: OPHTHALMOLOGY
Payer: MEDICARE

## 2019-08-01 ENCOUNTER — ANESTHESIA (OUTPATIENT)
Dept: SURGERY | Age: 69
End: 2019-08-01
Payer: MEDICARE

## 2019-08-01 VITALS
RESPIRATION RATE: 20 BRPM | OXYGEN SATURATION: 99 % | HEART RATE: 90 BPM | WEIGHT: 153 LBS | BODY MASS INDEX: 30.84 KG/M2 | SYSTOLIC BLOOD PRESSURE: 121 MMHG | HEIGHT: 59 IN | TEMPERATURE: 97.7 F | DIASTOLIC BLOOD PRESSURE: 74 MMHG

## 2019-08-01 VITALS — DIASTOLIC BLOOD PRESSURE: 65 MMHG | OXYGEN SATURATION: 100 % | SYSTOLIC BLOOD PRESSURE: 120 MMHG

## 2019-08-01 PROCEDURE — 3700000000 HC ANESTHESIA ATTENDED CARE: Performed by: OPHTHALMOLOGY

## 2019-08-01 PROCEDURE — 3600000004 HC SURGERY LEVEL 4 BASE: Performed by: OPHTHALMOLOGY

## 2019-08-01 PROCEDURE — 6370000000 HC RX 637 (ALT 250 FOR IP): Performed by: OPHTHALMOLOGY

## 2019-08-01 PROCEDURE — 7100000010 HC PHASE II RECOVERY - FIRST 15 MIN: Performed by: OPHTHALMOLOGY

## 2019-08-01 PROCEDURE — 6360000002 HC RX W HCPCS: Performed by: NURSE ANESTHETIST, CERTIFIED REGISTERED

## 2019-08-01 PROCEDURE — 2709999900 HC NON-CHARGEABLE SUPPLY: Performed by: OPHTHALMOLOGY

## 2019-08-01 PROCEDURE — C1780 LENS, INTRAOCULAR (NEW TECH): HCPCS | Performed by: OPHTHALMOLOGY

## 2019-08-01 PROCEDURE — 2500000003 HC RX 250 WO HCPCS: Performed by: OPHTHALMOLOGY

## 2019-08-01 PROCEDURE — 3700000001 HC ADD 15 MINUTES (ANESTHESIA): Performed by: OPHTHALMOLOGY

## 2019-08-01 PROCEDURE — 2580000003 HC RX 258: Performed by: ANESTHESIOLOGY

## 2019-08-01 PROCEDURE — 3600000014 HC SURGERY LEVEL 4 ADDTL 15MIN: Performed by: OPHTHALMOLOGY

## 2019-08-01 DEVICE — ACRYSOF(R) IQ TORIC ASTIGMATISM IOL, SINGLE-PIECE ACRYLIC FOLDABLE LENS, UV WITH BLUE LIGHTFILTER, 13.0MM LENGTH, 6.0MM BICONVEX TORICASPHERIC OPTIC, 2.25 D CYLINDER.
Type: IMPLANTABLE DEVICE | Site: EYE | Status: FUNCTIONAL
Brand: ACRYSOF®

## 2019-08-01 RX ORDER — CIPROFLOXACIN HYDROCHLORIDE 3.5 MG/ML
1 SOLUTION/ DROPS TOPICAL SEE ADMIN INSTRUCTIONS
Status: COMPLETED | OUTPATIENT
Start: 2019-08-01 | End: 2019-08-01

## 2019-08-01 RX ORDER — BRIMONIDINE TARTRATE 2 MG/ML
SOLUTION/ DROPS OPHTHALMIC
Status: COMPLETED | OUTPATIENT
Start: 2019-08-01 | End: 2019-08-01

## 2019-08-01 RX ORDER — SODIUM CHLORIDE 0.9 % (FLUSH) 0.9 %
10 SYRINGE (ML) INJECTION PRN
Status: DISCONTINUED | OUTPATIENT
Start: 2019-08-01 | End: 2019-08-01 | Stop reason: SDUPTHER

## 2019-08-01 RX ORDER — FENTANYL CITRATE 50 UG/ML
INJECTION, SOLUTION INTRAMUSCULAR; INTRAVENOUS PRN
Status: DISCONTINUED | OUTPATIENT
Start: 2019-08-01 | End: 2019-08-01 | Stop reason: SDUPTHER

## 2019-08-01 RX ORDER — LIDOCAINE HYDROCHLORIDE 10 MG/ML
INJECTION, SOLUTION EPIDURAL; INFILTRATION; INTRACAUDAL; PERINEURAL
Status: COMPLETED | OUTPATIENT
Start: 2019-08-01 | End: 2019-08-01

## 2019-08-01 RX ORDER — SODIUM CHLORIDE 0.9 % (FLUSH) 0.9 %
10 SYRINGE (ML) INJECTION PRN
Status: DISCONTINUED | OUTPATIENT
Start: 2019-08-01 | End: 2019-08-01 | Stop reason: HOSPADM

## 2019-08-01 RX ORDER — SODIUM CHLORIDE 0.9 % (FLUSH) 0.9 %
10 SYRINGE (ML) INJECTION EVERY 12 HOURS SCHEDULED
Status: DISCONTINUED | OUTPATIENT
Start: 2019-08-01 | End: 2019-08-01 | Stop reason: HOSPADM

## 2019-08-01 RX ORDER — SODIUM CHLORIDE 9 MG/ML
INJECTION, SOLUTION INTRAVENOUS CONTINUOUS
Status: DISCONTINUED | OUTPATIENT
Start: 2019-08-01 | End: 2019-08-01 | Stop reason: HOSPADM

## 2019-08-01 RX ORDER — TETRACAINE HYDROCHLORIDE 5 MG/ML
SOLUTION OPHTHALMIC
Status: COMPLETED | OUTPATIENT
Start: 2019-08-01 | End: 2019-08-01

## 2019-08-01 RX ORDER — TETRACAINE HYDROCHLORIDE 5 MG/ML
1 SOLUTION OPHTHALMIC ONCE
Status: COMPLETED | OUTPATIENT
Start: 2019-08-01 | End: 2019-08-01

## 2019-08-01 RX ORDER — SODIUM CHLORIDE 0.9 % (FLUSH) 0.9 %
10 SYRINGE (ML) INJECTION EVERY 12 HOURS SCHEDULED
Status: DISCONTINUED | OUTPATIENT
Start: 2019-08-01 | End: 2019-08-01 | Stop reason: SDUPTHER

## 2019-08-01 RX ORDER — MIDAZOLAM HYDROCHLORIDE 1 MG/ML
INJECTION INTRAMUSCULAR; INTRAVENOUS PRN
Status: DISCONTINUED | OUTPATIENT
Start: 2019-08-01 | End: 2019-08-01 | Stop reason: SDUPTHER

## 2019-08-01 RX ORDER — BALANCED SALT SOLUTION 6.4; .75; .48; .3; 3.9; 1.7 MG/ML; MG/ML; MG/ML; MG/ML; MG/ML; MG/ML
SOLUTION OPHTHALMIC
Status: COMPLETED | OUTPATIENT
Start: 2019-08-01 | End: 2019-08-01

## 2019-08-01 RX ADMIN — MIDAZOLAM 1 MG: 1 INJECTION INTRAMUSCULAR; INTRAVENOUS at 08:40

## 2019-08-01 RX ADMIN — Medication 3 ML: at 07:53

## 2019-08-01 RX ADMIN — TETRACAINE HYDROCHLORIDE 1 DROP: 5 SOLUTION OPHTHALMIC at 07:53

## 2019-08-01 RX ADMIN — Medication 3 ML: at 08:01

## 2019-08-01 RX ADMIN — CIPROFLOXACIN 1 DROP: 3 SOLUTION OPHTHALMIC at 08:01

## 2019-08-01 RX ADMIN — CIPROFLOXACIN 1 DROP: 3 SOLUTION OPHTHALMIC at 07:53

## 2019-08-01 RX ADMIN — FENTANYL CITRATE 50 MCG: 50 INJECTION INTRAMUSCULAR; INTRAVENOUS at 08:40

## 2019-08-01 RX ADMIN — POVIDONE-IODINE 0.1 ML: 5 SOLUTION OPHTHALMIC at 07:53

## 2019-08-01 RX ADMIN — SODIUM CHLORIDE: 0.9 INJECTION, SOLUTION INTRAVENOUS at 07:53

## 2019-08-01 ASSESSMENT — PAIN - FUNCTIONAL ASSESSMENT: PAIN_FUNCTIONAL_ASSESSMENT: 0-10

## 2019-08-01 ASSESSMENT — PAIN SCALES - GENERAL
PAINLEVEL_OUTOF10: 0
PAINLEVEL_OUTOF10: 0

## 2019-08-01 ASSESSMENT — LIFESTYLE VARIABLES: SMOKING_STATUS: 0

## 2019-08-01 NOTE — OP NOTE
Jeannette Tripp    OPERATIVE NOTE    Preoperative Diagnosis: Cataract left eye, astigmatism    Postoperative Diagnosis: Cataract left eye, astigmatism    Procedure: Phacoemulsification with toric intraocular lens inplantation, left eye  Surgeon: Jia Rodriguez MD    Anesthesia: MAC, topical.    Complications: none    Estimated blood loss: minimal    Specimens: none    Indications for procedure: The patient is a 77y.o. year old with decreased vision, glare and halos around lights, and trouble with activities of daily living. Examination revealed a visually significant cataract in the left eye. Risks, benefits, and alternatives to surgery were discussed with the patient and the patient elected to proceed with phacoemulsification with lens implantation. Details of the procedure:  Prior to traveling back to the operating room the patient's cornea was marked at the 3, 9, and 12 o'clock positions with a surgical marking pen. The patient was then taken to the operating room and placed in the supine position. Monitored anesthesia care was administered. The eye was prepped and draped in the usual sterile fashion using aseptic technique for cataract surgery. The axis was marked on the cornea in preparation for toric IOL alignment. A side port incision was made. 1% preservative free lidocaine was injected through the side port incision for topical anesthesia. The eye was filled with viscoelastic and a 2.4 mm keratome blade was used to make a 3-plane clear corneal incision in the temporal cornea. The cystitome was used to make a tear in the anterior capsule and a Utrata forceps was used to make a complete curvilinear capsulorrhexis. The lens was hydrodissected and freely rotated. Phacoemulsification was performed. Irrigation/aspiration was used to remove all cortical material from the capsular bag.   The eye was filled with viscoelastic and a foldable posterior chamber intraocular lens was injected into the capsular

## 2019-08-23 ENCOUNTER — OFFICE VISIT (OUTPATIENT)
Dept: GYNECOLOGY | Age: 69
End: 2019-08-23
Payer: MEDICARE

## 2019-08-23 VITALS
TEMPERATURE: 98.6 F | HEART RATE: 97 BPM | SYSTOLIC BLOOD PRESSURE: 151 MMHG | HEIGHT: 59 IN | DIASTOLIC BLOOD PRESSURE: 81 MMHG | WEIGHT: 153 LBS | BODY MASS INDEX: 30.84 KG/M2

## 2019-08-23 DIAGNOSIS — Z01.411 ENCOUNTER FOR GYNECOLOGICAL EXAMINATION WITH ABNORMAL FINDING: ICD-10-CM

## 2019-08-23 DIAGNOSIS — R87.613 HGSIL (HIGH GRADE SQUAMOUS INTRAEPITHELIAL LESION) ON PAP SMEAR OF CERVIX: Primary | ICD-10-CM

## 2019-08-23 PROCEDURE — 99213 OFFICE O/P EST LOW 20 MIN: CPT | Performed by: OBSTETRICS & GYNECOLOGY

## 2019-08-23 PROCEDURE — G8599 NO ASA/ANTIPLAT THER USE RNG: HCPCS | Performed by: OBSTETRICS & GYNECOLOGY

## 2019-08-23 PROCEDURE — 1123F ACP DISCUSS/DSCN MKR DOCD: CPT | Performed by: OBSTETRICS & GYNECOLOGY

## 2019-08-23 PROCEDURE — 3017F COLORECTAL CA SCREEN DOC REV: CPT | Performed by: OBSTETRICS & GYNECOLOGY

## 2019-08-23 PROCEDURE — 4040F PNEUMOC VAC/ADMIN/RCVD: CPT | Performed by: OBSTETRICS & GYNECOLOGY

## 2019-08-23 PROCEDURE — G8427 DOCREV CUR MEDS BY ELIG CLIN: HCPCS | Performed by: OBSTETRICS & GYNECOLOGY

## 2019-08-23 PROCEDURE — 1036F TOBACCO NON-USER: CPT | Performed by: OBSTETRICS & GYNECOLOGY

## 2019-08-23 PROCEDURE — 1090F PRES/ABSN URINE INCON ASSESS: CPT | Performed by: OBSTETRICS & GYNECOLOGY

## 2019-08-23 PROCEDURE — G8417 CALC BMI ABV UP PARAM F/U: HCPCS | Performed by: OBSTETRICS & GYNECOLOGY

## 2019-08-23 PROCEDURE — G8399 PT W/DXA RESULTS DOCUMENT: HCPCS | Performed by: OBSTETRICS & GYNECOLOGY

## 2019-09-09 ENCOUNTER — OFFICE VISIT (OUTPATIENT)
Dept: FAMILY MEDICINE CLINIC | Age: 69
End: 2019-09-09
Payer: MEDICARE

## 2019-09-09 VITALS
HEART RATE: 86 BPM | OXYGEN SATURATION: 97 % | HEIGHT: 59 IN | BODY MASS INDEX: 31.49 KG/M2 | RESPIRATION RATE: 13 BRPM | SYSTOLIC BLOOD PRESSURE: 116 MMHG | WEIGHT: 156.2 LBS | DIASTOLIC BLOOD PRESSURE: 82 MMHG

## 2019-09-09 DIAGNOSIS — L40.1 PUSTULAR PSORIASIS: ICD-10-CM

## 2019-09-09 DIAGNOSIS — I10 ESSENTIAL HYPERTENSION: ICD-10-CM

## 2019-09-09 DIAGNOSIS — R60.0 EDEMA OF RIGHT LOWER EXTREMITY: ICD-10-CM

## 2019-09-09 DIAGNOSIS — J43.2 CENTRILOBULAR EMPHYSEMA (HCC): ICD-10-CM

## 2019-09-09 DIAGNOSIS — I10 ESSENTIAL HYPERTENSION: Primary | ICD-10-CM

## 2019-09-09 LAB
ALBUMIN SERPL-MCNC: 4.7 G/DL (ref 3.4–5)
ANION GAP SERPL CALCULATED.3IONS-SCNC: 18 MMOL/L (ref 3–16)
BUN BLDV-MCNC: 13 MG/DL (ref 7–20)
CALCIUM SERPL-MCNC: 9.6 MG/DL (ref 8.3–10.6)
CHLORIDE BLD-SCNC: 100 MMOL/L (ref 99–110)
CO2: 25 MMOL/L (ref 21–32)
CREAT SERPL-MCNC: 0.9 MG/DL (ref 0.6–1.2)
GFR AFRICAN AMERICAN: >60
GFR NON-AFRICAN AMERICAN: >60
GLUCOSE BLD-MCNC: 104 MG/DL (ref 70–99)
PHOSPHORUS: 3.6 MG/DL (ref 2.5–4.9)
POTASSIUM SERPL-SCNC: 4.5 MMOL/L (ref 3.5–5.1)
SODIUM BLD-SCNC: 143 MMOL/L (ref 136–145)

## 2019-09-09 PROCEDURE — 3023F SPIROM DOC REV: CPT | Performed by: INTERNAL MEDICINE

## 2019-09-09 PROCEDURE — 1036F TOBACCO NON-USER: CPT | Performed by: INTERNAL MEDICINE

## 2019-09-09 PROCEDURE — G8599 NO ASA/ANTIPLAT THER USE RNG: HCPCS | Performed by: INTERNAL MEDICINE

## 2019-09-09 PROCEDURE — G8926 SPIRO NO PERF OR DOC: HCPCS | Performed by: INTERNAL MEDICINE

## 2019-09-09 PROCEDURE — 4040F PNEUMOC VAC/ADMIN/RCVD: CPT | Performed by: INTERNAL MEDICINE

## 2019-09-09 PROCEDURE — G8399 PT W/DXA RESULTS DOCUMENT: HCPCS | Performed by: INTERNAL MEDICINE

## 2019-09-09 PROCEDURE — G8427 DOCREV CUR MEDS BY ELIG CLIN: HCPCS | Performed by: INTERNAL MEDICINE

## 2019-09-09 PROCEDURE — G8417 CALC BMI ABV UP PARAM F/U: HCPCS | Performed by: INTERNAL MEDICINE

## 2019-09-09 PROCEDURE — 3017F COLORECTAL CA SCREEN DOC REV: CPT | Performed by: INTERNAL MEDICINE

## 2019-09-09 PROCEDURE — 99214 OFFICE O/P EST MOD 30 MIN: CPT | Performed by: INTERNAL MEDICINE

## 2019-09-09 PROCEDURE — 1123F ACP DISCUSS/DSCN MKR DOCD: CPT | Performed by: INTERNAL MEDICINE

## 2019-09-09 PROCEDURE — 1090F PRES/ABSN URINE INCON ASSESS: CPT | Performed by: INTERNAL MEDICINE

## 2019-09-09 ASSESSMENT — ENCOUNTER SYMPTOMS
DIARRHEA: 0
WHEEZING: 0
SHORTNESS OF BREATH: 0
CONSTIPATION: 0

## 2019-09-09 NOTE — PROGRESS NOTES
09/11/2018     09/11/2018     No results found for: TSH  Lab Results   Component Value Date    LABA1C 5.0 03/12/2018     No results found for: PSA, PSADIA     PHYSICAL EXAM:  /82   Pulse 86   Resp 13   Ht 4' 11\" (1.499 m)   Wt 156 lb 3.2 oz (70.9 kg)   LMP  (LMP Unknown)   SpO2 97%   BMI 31.55 kg/m²    Physical Exam   Constitutional: She appears well-developed and well-nourished. HENT:   Head: Normocephalic and atraumatic. Cardiovascular: Normal rate and regular rhythm. No murmur heard. Pulmonary/Chest: Effort normal and breath sounds normal. She has no wheezes. Neurological: She is alert. Skin: Skin is warm and dry. Has rash left tibia  Right leg edema , no open wounds  Left leg some edema also   But greater on the right leg   Psychiatric: She has a normal mood and affect. Her behavior is normal. Judgment and thought content normal.     BP Readings from Last 5 Encounters:   09/09/19 116/82   08/23/19 (!) 151/81   08/01/19 120/65   08/01/19 121/74   07/18/19 116/62       Wt Readings from Last 5 Encounters:   09/09/19 156 lb 3.2 oz (70.9 kg)   08/23/19 153 lb (69.4 kg)   08/01/19 153 lb (69.4 kg)   07/18/19 154 lb (69.9 kg)   07/15/19 153 lb (69.4 kg)      Gabby Garcia was seen today for hypertension and other. Diagnoses and all orders for this visit:    Essential hypertension    Pustular psoriasis, Dr. [facility]    Centrilobular emphysema [facility])    Edema of right lower extremity aug/sept  2018    she does not want to see derm. Says meds are too expensive  Lungs clear  bp good  Takes lasix in the am but not today  Only takes aleve as needed not that often.

## 2019-09-23 ENCOUNTER — HOSPITAL ENCOUNTER (OUTPATIENT)
Dept: WOMENS IMAGING | Age: 69
Discharge: HOME | End: 2019-09-23
Payer: MEDICARE

## 2019-09-23 DIAGNOSIS — Z12.39 BREAST CANCER SCREENING: ICD-10-CM

## 2019-09-23 DIAGNOSIS — Z01.419 WELL WOMAN EXAM WITH ROUTINE GYNECOLOGICAL EXAM: ICD-10-CM

## 2019-09-23 PROCEDURE — 77067 SCR MAMMO BI INCL CAD: CPT

## 2019-10-02 ENCOUNTER — OFFICE VISIT (OUTPATIENT)
Dept: PULMONOLOGY | Age: 69
End: 2019-10-02
Payer: MEDICARE

## 2019-10-02 VITALS
TEMPERATURE: 98.3 F | BODY MASS INDEX: 31.25 KG/M2 | DIASTOLIC BLOOD PRESSURE: 72 MMHG | WEIGHT: 155 LBS | HEIGHT: 59 IN | SYSTOLIC BLOOD PRESSURE: 124 MMHG | HEART RATE: 88 BPM | RESPIRATION RATE: 16 BRPM | OXYGEN SATURATION: 96 %

## 2019-10-02 DIAGNOSIS — Z87.891 PERSONAL HISTORY OF TOBACCO USE: ICD-10-CM

## 2019-10-02 DIAGNOSIS — Z23 NEEDS FLU SHOT: Primary | ICD-10-CM

## 2019-10-02 DIAGNOSIS — J43.2 CENTRILOBULAR EMPHYSEMA (HCC): ICD-10-CM

## 2019-10-02 PROCEDURE — G8926 SPIRO NO PERF OR DOC: HCPCS | Performed by: INTERNAL MEDICINE

## 2019-10-02 PROCEDURE — 99213 OFFICE O/P EST LOW 20 MIN: CPT | Performed by: INTERNAL MEDICINE

## 2019-10-02 PROCEDURE — 3017F COLORECTAL CA SCREEN DOC REV: CPT | Performed by: INTERNAL MEDICINE

## 2019-10-02 PROCEDURE — 1036F TOBACCO NON-USER: CPT | Performed by: INTERNAL MEDICINE

## 2019-10-02 PROCEDURE — 1090F PRES/ABSN URINE INCON ASSESS: CPT | Performed by: INTERNAL MEDICINE

## 2019-10-02 PROCEDURE — G8599 NO ASA/ANTIPLAT THER USE RNG: HCPCS | Performed by: INTERNAL MEDICINE

## 2019-10-02 PROCEDURE — 4040F PNEUMOC VAC/ADMIN/RCVD: CPT | Performed by: INTERNAL MEDICINE

## 2019-10-02 PROCEDURE — G8427 DOCREV CUR MEDS BY ELIG CLIN: HCPCS | Performed by: INTERNAL MEDICINE

## 2019-10-02 PROCEDURE — G8399 PT W/DXA RESULTS DOCUMENT: HCPCS | Performed by: INTERNAL MEDICINE

## 2019-10-02 PROCEDURE — 3023F SPIROM DOC REV: CPT | Performed by: INTERNAL MEDICINE

## 2019-10-02 PROCEDURE — G8417 CALC BMI ABV UP PARAM F/U: HCPCS | Performed by: INTERNAL MEDICINE

## 2019-10-02 PROCEDURE — 1123F ACP DISCUSS/DSCN MKR DOCD: CPT | Performed by: INTERNAL MEDICINE

## 2019-10-02 PROCEDURE — G8482 FLU IMMUNIZE ORDER/ADMIN: HCPCS | Performed by: INTERNAL MEDICINE

## 2019-10-03 PROCEDURE — G0008 ADMIN INFLUENZA VIRUS VAC: HCPCS | Performed by: INTERNAL MEDICINE

## 2019-10-03 PROCEDURE — 90653 IIV ADJUVANT VACCINE IM: CPT | Performed by: INTERNAL MEDICINE

## 2019-10-10 ENCOUNTER — HOSPITAL ENCOUNTER (OUTPATIENT)
Dept: CT IMAGING | Age: 69
Discharge: HOME | End: 2019-10-10
Payer: MEDICARE

## 2019-10-10 ENCOUNTER — TELEPHONE (OUTPATIENT)
Dept: FAMILY MEDICINE CLINIC | Age: 69
End: 2019-10-10

## 2019-10-10 DIAGNOSIS — J43.2 CENTRILOBULAR EMPHYSEMA (HCC): Primary | ICD-10-CM

## 2019-10-10 DIAGNOSIS — J43.2 CENTRILOBULAR EMPHYSEMA (HCC): ICD-10-CM

## 2019-10-10 DIAGNOSIS — Z87.891 PERSONAL HISTORY OF TOBACCO USE: ICD-10-CM

## 2019-10-10 PROCEDURE — 71250 CT THORAX DX C-: CPT

## 2019-10-10 RX ORDER — IPRATROPIUM BROMIDE AND ALBUTEROL SULFATE 2.5; .5 MG/3ML; MG/3ML
1 SOLUTION RESPIRATORY (INHALATION) EVERY 4 HOURS PRN
Qty: 360 ML | Refills: 1 | Status: SHIPPED | OUTPATIENT
Start: 2019-10-10 | End: 2022-08-26 | Stop reason: SDUPTHER

## 2019-10-15 RX ORDER — ALBUTEROL SULFATE 90 UG/1
2 AEROSOL, METERED RESPIRATORY (INHALATION) EVERY 6 HOURS PRN
Qty: 1 INHALER | Refills: 3 | Status: SHIPPED | OUTPATIENT
Start: 2019-10-15 | End: 2021-02-03 | Stop reason: SDUPTHER

## 2019-12-09 ENCOUNTER — OFFICE VISIT (OUTPATIENT)
Dept: FAMILY MEDICINE CLINIC | Age: 69
End: 2019-12-09
Payer: MEDICARE

## 2019-12-09 VITALS
BODY MASS INDEX: 31.89 KG/M2 | HEART RATE: 96 BPM | WEIGHT: 158.2 LBS | OXYGEN SATURATION: 96 % | SYSTOLIC BLOOD PRESSURE: 142 MMHG | RESPIRATION RATE: 16 BRPM | DIASTOLIC BLOOD PRESSURE: 82 MMHG | HEIGHT: 59 IN

## 2019-12-09 DIAGNOSIS — J43.2 CENTRILOBULAR EMPHYSEMA (HCC): Primary | ICD-10-CM

## 2019-12-09 DIAGNOSIS — I10 ESSENTIAL HYPERTENSION: ICD-10-CM

## 2019-12-09 DIAGNOSIS — L40.1 PUSTULAR PSORIASIS: ICD-10-CM

## 2019-12-09 LAB
ALBUMIN SERPL-MCNC: 4.6 G/DL (ref 3.4–5)
ANION GAP SERPL CALCULATED.3IONS-SCNC: 17 MMOL/L (ref 3–16)
BUN BLDV-MCNC: 8 MG/DL (ref 7–20)
CALCIUM SERPL-MCNC: 9.8 MG/DL (ref 8.3–10.6)
CHLORIDE BLD-SCNC: 102 MMOL/L (ref 99–110)
CO2: 24 MMOL/L (ref 21–32)
CREAT SERPL-MCNC: 0.8 MG/DL (ref 0.6–1.2)
GFR AFRICAN AMERICAN: >60
GFR NON-AFRICAN AMERICAN: >60
GLUCOSE BLD-MCNC: 98 MG/DL (ref 70–99)
PHOSPHORUS: 3.1 MG/DL (ref 2.5–4.9)
POTASSIUM SERPL-SCNC: 4.1 MMOL/L (ref 3.5–5.1)
SODIUM BLD-SCNC: 143 MMOL/L (ref 136–145)

## 2019-12-09 PROCEDURE — 3288F FALL RISK ASSESSMENT DOCD: CPT | Performed by: INTERNAL MEDICINE

## 2019-12-09 PROCEDURE — G8417 CALC BMI ABV UP PARAM F/U: HCPCS | Performed by: INTERNAL MEDICINE

## 2019-12-09 PROCEDURE — G8399 PT W/DXA RESULTS DOCUMENT: HCPCS | Performed by: INTERNAL MEDICINE

## 2019-12-09 PROCEDURE — G8427 DOCREV CUR MEDS BY ELIG CLIN: HCPCS | Performed by: INTERNAL MEDICINE

## 2019-12-09 PROCEDURE — 4040F PNEUMOC VAC/ADMIN/RCVD: CPT | Performed by: INTERNAL MEDICINE

## 2019-12-09 PROCEDURE — 3023F SPIROM DOC REV: CPT | Performed by: INTERNAL MEDICINE

## 2019-12-09 PROCEDURE — G8926 SPIRO NO PERF OR DOC: HCPCS | Performed by: INTERNAL MEDICINE

## 2019-12-09 PROCEDURE — 99214 OFFICE O/P EST MOD 30 MIN: CPT | Performed by: INTERNAL MEDICINE

## 2019-12-09 PROCEDURE — 1090F PRES/ABSN URINE INCON ASSESS: CPT | Performed by: INTERNAL MEDICINE

## 2019-12-09 PROCEDURE — 1036F TOBACCO NON-USER: CPT | Performed by: INTERNAL MEDICINE

## 2019-12-09 PROCEDURE — G8599 NO ASA/ANTIPLAT THER USE RNG: HCPCS | Performed by: INTERNAL MEDICINE

## 2019-12-09 PROCEDURE — G8482 FLU IMMUNIZE ORDER/ADMIN: HCPCS | Performed by: INTERNAL MEDICINE

## 2019-12-09 PROCEDURE — 1123F ACP DISCUSS/DSCN MKR DOCD: CPT | Performed by: INTERNAL MEDICINE

## 2019-12-09 PROCEDURE — 3017F COLORECTAL CA SCREEN DOC REV: CPT | Performed by: INTERNAL MEDICINE

## 2019-12-09 ASSESSMENT — ENCOUNTER SYMPTOMS
SHORTNESS OF BREATH: 0
WHEEZING: 0

## 2019-12-27 ENCOUNTER — OFFICE VISIT (OUTPATIENT)
Dept: GYNECOLOGY | Age: 69
End: 2019-12-27
Payer: MEDICARE

## 2019-12-27 VITALS
WEIGHT: 156.6 LBS | HEIGHT: 59 IN | DIASTOLIC BLOOD PRESSURE: 74 MMHG | BODY MASS INDEX: 31.57 KG/M2 | HEART RATE: 88 BPM | OXYGEN SATURATION: 96 % | TEMPERATURE: 97.3 F | RESPIRATION RATE: 16 BRPM | SYSTOLIC BLOOD PRESSURE: 139 MMHG

## 2019-12-27 DIAGNOSIS — R87.613 HGSIL (HIGH GRADE SQUAMOUS INTRAEPITHELIAL LESION) ON PAP SMEAR OF CERVIX: Primary | ICD-10-CM

## 2019-12-27 DIAGNOSIS — Z01.411 ENCOUNTER FOR GYNECOLOGICAL EXAMINATION (GENERAL) (ROUTINE) WITH ABNORMAL FINDINGS: ICD-10-CM

## 2019-12-27 PROCEDURE — G8482 FLU IMMUNIZE ORDER/ADMIN: HCPCS | Performed by: OBSTETRICS & GYNECOLOGY

## 2019-12-27 PROCEDURE — G8427 DOCREV CUR MEDS BY ELIG CLIN: HCPCS | Performed by: OBSTETRICS & GYNECOLOGY

## 2019-12-27 PROCEDURE — 99213 OFFICE O/P EST LOW 20 MIN: CPT | Performed by: OBSTETRICS & GYNECOLOGY

## 2019-12-27 PROCEDURE — G8599 NO ASA/ANTIPLAT THER USE RNG: HCPCS | Performed by: OBSTETRICS & GYNECOLOGY

## 2019-12-27 PROCEDURE — 3017F COLORECTAL CA SCREEN DOC REV: CPT | Performed by: OBSTETRICS & GYNECOLOGY

## 2019-12-27 PROCEDURE — 1123F ACP DISCUSS/DSCN MKR DOCD: CPT | Performed by: OBSTETRICS & GYNECOLOGY

## 2019-12-27 PROCEDURE — 1090F PRES/ABSN URINE INCON ASSESS: CPT | Performed by: OBSTETRICS & GYNECOLOGY

## 2019-12-27 PROCEDURE — G8417 CALC BMI ABV UP PARAM F/U: HCPCS | Performed by: OBSTETRICS & GYNECOLOGY

## 2019-12-27 PROCEDURE — 1036F TOBACCO NON-USER: CPT | Performed by: OBSTETRICS & GYNECOLOGY

## 2019-12-27 PROCEDURE — G8399 PT W/DXA RESULTS DOCUMENT: HCPCS | Performed by: OBSTETRICS & GYNECOLOGY

## 2019-12-27 PROCEDURE — 4040F PNEUMOC VAC/ADMIN/RCVD: CPT | Performed by: OBSTETRICS & GYNECOLOGY

## 2020-03-09 ENCOUNTER — OFFICE VISIT (OUTPATIENT)
Dept: FAMILY MEDICINE CLINIC | Age: 70
End: 2020-03-09
Payer: MEDICARE

## 2020-03-09 VITALS
RESPIRATION RATE: 14 BRPM | HEIGHT: 59 IN | WEIGHT: 159.8 LBS | OXYGEN SATURATION: 95 % | DIASTOLIC BLOOD PRESSURE: 74 MMHG | SYSTOLIC BLOOD PRESSURE: 130 MMHG | BODY MASS INDEX: 32.21 KG/M2 | HEART RATE: 87 BPM

## 2020-03-09 PROCEDURE — 3023F SPIROM DOC REV: CPT | Performed by: INTERNAL MEDICINE

## 2020-03-09 PROCEDURE — 3017F COLORECTAL CA SCREEN DOC REV: CPT | Performed by: INTERNAL MEDICINE

## 2020-03-09 PROCEDURE — G8399 PT W/DXA RESULTS DOCUMENT: HCPCS | Performed by: INTERNAL MEDICINE

## 2020-03-09 PROCEDURE — G8482 FLU IMMUNIZE ORDER/ADMIN: HCPCS | Performed by: INTERNAL MEDICINE

## 2020-03-09 PROCEDURE — G8417 CALC BMI ABV UP PARAM F/U: HCPCS | Performed by: INTERNAL MEDICINE

## 2020-03-09 PROCEDURE — 1090F PRES/ABSN URINE INCON ASSESS: CPT | Performed by: INTERNAL MEDICINE

## 2020-03-09 PROCEDURE — 99214 OFFICE O/P EST MOD 30 MIN: CPT | Performed by: INTERNAL MEDICINE

## 2020-03-09 PROCEDURE — G8926 SPIRO NO PERF OR DOC: HCPCS | Performed by: INTERNAL MEDICINE

## 2020-03-09 PROCEDURE — G8427 DOCREV CUR MEDS BY ELIG CLIN: HCPCS | Performed by: INTERNAL MEDICINE

## 2020-03-09 PROCEDURE — 1123F ACP DISCUSS/DSCN MKR DOCD: CPT | Performed by: INTERNAL MEDICINE

## 2020-03-09 PROCEDURE — 4040F PNEUMOC VAC/ADMIN/RCVD: CPT | Performed by: INTERNAL MEDICINE

## 2020-03-09 PROCEDURE — 1036F TOBACCO NON-USER: CPT | Performed by: INTERNAL MEDICINE

## 2020-03-09 RX ORDER — FLUTICASONE FUROATE, UMECLIDINIUM BROMIDE AND VILANTEROL TRIFENATATE 100; 62.5; 25 UG/1; UG/1; UG/1
1 POWDER RESPIRATORY (INHALATION) DAILY
Qty: 1 EACH | Refills: 2 | Status: SHIPPED | OUTPATIENT
Start: 2020-03-09 | End: 2022-08-26 | Stop reason: SDUPTHER

## 2020-03-09 ASSESSMENT — ENCOUNTER SYMPTOMS
WHEEZING: 0
DIARRHEA: 0
CONSTIPATION: 0
SHORTNESS OF BREATH: 0

## 2020-03-09 ASSESSMENT — PATIENT HEALTH QUESTIONNAIRE - PHQ9
1. LITTLE INTEREST OR PLEASURE IN DOING THINGS: 0
2. FEELING DOWN, DEPRESSED OR HOPELESS: 0
SUM OF ALL RESPONSES TO PHQ9 QUESTIONS 1 & 2: 0
SUM OF ALL RESPONSES TO PHQ QUESTIONS 1-9: 0
SUM OF ALL RESPONSES TO PHQ QUESTIONS 1-9: 0

## 2020-03-09 NOTE — PROGRESS NOTES
3/9/2020    Chief Complaint   Patient presents with    Hypertension       HPI  No trouble with the lungs . htn  Not cking bp at home . Machine broke  On amlodipine daily  Lasix as needed    Takes meds daily  No headache or dizziness    Uses albuterol as needed. Review of Systems   Constitutional: Positive for unexpected weight change (gained  weight ). Negative for appetite change. Respiratory: Negative for shortness of breath and wheezing. Gastrointestinal: Negative for constipation and diarrhea. Neurological: Negative for dizziness and light-headedness.        Health Maintenance   Topic Date Due    Shingles Vaccine (2 of 3) 2016    DTaP/Tdap/Td vaccine (2 - Td) 2019    Annual Wellness Visit (AWV)  2019    Potassium monitoring  2020    Creatinine monitoring  2020    Breast cancer screen  2021    Lipid screen  2024    Colon cancer screen colonoscopy  2028    Flu vaccine  Completed    Pneumococcal 65+ years Vaccine  Completed    Hepatitis C screen  Completed    DEXA (modify frequency per FRAX score)  Addressed    Hepatitis A vaccine  Aged Out    Hepatitis B vaccine  Aged Out    Hib vaccine  Aged Out    Meningococcal (ACWY) vaccine  Aged Out      Social History     Socioeconomic History    Marital status: Single     Spouse name: None    Number of children: 2    Years of education: None    Highest education level: None   Occupational History    Occupation: retired      Comment: retired   Social Needs    Financial resource strain: None    Food insecurity     Worry: None     Inability: None    Transportation needs     Medical: None     Non-medical: None   Tobacco Use    Smoking status: Former Smoker     Packs/day: 0.50     Years: 11.00     Pack years: 5.50     Types: Cigarettes     Start date: 1966     Last attempt to quit: 8/3/2015     Years since quittin.6    Smokeless tobacco: Never Used    Tobacco comment: counseled on (NEBULIZER/TUBING/MOUTHPIECE) KIT 1 kit by Does not apply route daily Yes Jesús Garibay MD     Patient Active Problem List   Diagnosis    COPD (chronic obstructive pulmonary disease)--pfts show severe obst defect w severe decrease dlco  +bronchodilator response 6/14--repeat unchanged    Tobacco abuse-pt quit 7/15--cold turkey    Pulmonary nodule, left- on lung cancer screening program-  pet scan 6/16=unchanged billy nodule-little activity lung--pos activoity ge jct (still need to cont puln nod surv x 2 yrs)    Dependent edema--echo 10/15--nl ef--mild diast dysf--advised low salt diet/ stockings/ wt loss    Essential hypertension    Diastolic dysfunction--mild on echo 10/15--nl ef    Pustular psoriasis, Dr. Trinidad Rasmussen    Centrilobular emphysema [facility])    Mediastinal adenopathy    SIRS (systemic inflammatory response syndrome) (HCC)    Leukocytosis    Ex-smoker    Tobacco abuse counseling    Elevated brain natriuretic peptide (BNP) level    Enlarged lymph node enlarged ing ly node 2018    Inguinal adenopathy work up  2018 Dr. Tiffanie Coello.  neg path     Edema of right lower extremity aug/sept  2018    Coronary artery calcification    S/P colonoscopy 9/18 [facility] - 8mm polyp    HGSIL (high grade squamous intraepithelial lesion) on Pap smear of cervix        LABS:   Lab Results   Component Value Date    GLUCOSE 98 12/09/2019     Lab Results   Component Value Date     12/09/2019    K 4.1 12/09/2019    CREATININE 0.8 12/09/2019     Cholesterol, Total   Date Value Ref Range Status   05/08/2019 189 0 - 199 mg/dL Final     LDL Calculated   Date Value Ref Range Status   05/08/2019 100 (H) <100 mg/dL Final     HDL   Date Value Ref Range Status   05/08/2019 66 (H) 40 - 60 mg/dL Final     Triglycerides   Date Value Ref Range Status   05/08/2019 117 0 - 150 mg/dL Final     Lab Results   Component Value Date    ALT 20 06/21/2019    AST 17 06/21/2019    ALKPHOS 125 06/21/2019    BILITOT 0.3 06/21/2019      Lab Results   Component Value Date    WBC 9.3 09/11/2018    HGB 12.9 09/11/2018    HCT 39.6 09/11/2018    MCV 83.6 09/11/2018     09/11/2018     No results found for: TSH  Lab Results   Component Value Date    LABA1C 5.0 03/12/2018     No results found for: PSA, PSADIA     PHYSICAL EXAM:  /74   Pulse 87   Resp 14   Ht 4' 11\" (1.499 m)   Wt 159 lb 12.8 oz (72.5 kg)   LMP  (LMP Unknown)   SpO2 95%   BMI 32.28 kg/m²    Physical Exam  Constitutional:       Appearance: She is well-developed. She is obese. HENT:      Head: Normocephalic and atraumatic. Cardiovascular:      Rate and Rhythm: Normal rate and regular rhythm. Heart sounds: No murmur. Pulmonary:      Effort: Pulmonary effort is normal.      Breath sounds: No wheezing. Comments: Few crackles bases lungs bilat  Musculoskeletal:         General: Swelling (right lower ext trace    ) present. Skin:     General: Skin is warm and dry. Neurological:      Mental Status: She is alert. Psychiatric:         Mood and Affect: Mood normal.         Behavior: Behavior normal.         Thought Content: Thought content normal.         Judgment: Judgment normal.     large red patch left tibia  Right tibia pink  Skin but not as red as left leg    BP Readings from Last 5 Encounters:   03/09/20 130/74   12/27/19 139/74   12/09/19 (!) 142/82   10/02/19 124/72   09/09/19 116/82       Wt Readings from Last 5 Encounters:   03/09/20 159 lb 12.8 oz (72.5 kg)   12/27/19 156 lb 9.6 oz (71 kg)   12/09/19 158 lb 3.2 oz (71.8 kg)   10/02/19 155 lb (70.3 kg)   09/09/19 156 lb 3.2 oz (70.9 kg)        ASSESSMENT/PLAN:    Dian Favre was seen today for hypertension. Diagnoses and all orders for this visit:    Centrilobular emphysema ([address]    Essential hypertension    Edema of right lower extremity aug/sept  2018    Pustular psoriasis, Dr. Pham Whitaker    Other orders  -     fluticasone-umeclidin-vilant (Raford Duverney) 100-62.5-25 MCG/INH AEPB;  Inhale 1 puff into the

## 2020-06-10 ENCOUNTER — VIRTUAL VISIT (OUTPATIENT)
Dept: FAMILY MEDICINE CLINIC | Age: 70
End: 2020-06-10
Payer: MEDICARE

## 2020-06-10 PROCEDURE — 99441 PR PHYS/QHP TELEPHONE EVALUATION 5-10 MIN: CPT | Performed by: INTERNAL MEDICINE

## 2020-06-10 ASSESSMENT — ENCOUNTER SYMPTOMS
CONSTIPATION: 0
WHEEZING: 0
DIARRHEA: 0
SHORTNESS OF BREATH: 0

## 2020-06-10 NOTE — PROGRESS NOTES
6/10/2020   Patient-Reported Weight 150   Patient-Reported Systolic 618   Patient-Reported Diastolic 74   Patient-Reported Pulse 116     Jeannette was seen today for hypertension. Diagnoses and all orders for this visit:    Essential hypertension  -     Comprehensive Metabolic Panel; Future    Prediabetes  -     Hemoglobin A1C; Future    Screening for lipid disorders  -     Lipid Panel; Future    need lab work done . Try to do in the next couple of months  Doing well    Moon Casarez is a 68 y.o. female being evaluated by a Virtual Visit (video visit) encounter to address concerns as mentioned above. A caregiver was present when appropriate. Due to this being a TeleHealth encounter (During [facility]-12 public health emergency), evaluation of the following organ systems was limited: Vitals/Constitutional/EENT/Resp/CV/GI//MS/Neuro/Skin/Heme-Lymph-Imm. Pursuant to the emergency declaration under the [address] [place] [address], [address] [place] [address] authority and the [employer] Act, this Virtual Visit was conducted with patient's (and/or legal guardian's) consent, to reduce the patient's risk of exposure to COVID-19 and provide necessary medical care. The patient (and/or legal guardian) has also been advised to contact this office for worsening conditions or problems, and seek emergency medical treatment and/or call 911 if deemed necessary. Patient identification was verified at the start of the visit: Yes    Total time spent for this encounter: 10 min  Services were provided through a phone  discussion virtually to substitute for in-person clinic visit. Patient and provider were located at their individual homes. --Prabhakar Middleton MD on 6/10/2020 at 10:43 AM    An electronic signature was used to authenticate this note.      Note: not billable if this call serves to triage the patient into an appointment for the relevant concern

## 2020-06-29 RX ORDER — AMLODIPINE BESYLATE 10 MG/1
TABLET ORAL
Qty: 90 TABLET | Refills: 1 | Status: SHIPPED | OUTPATIENT
Start: 2020-06-29 | End: 2020-12-18

## 2020-06-29 RX ORDER — FUROSEMIDE 20 MG/1
TABLET ORAL
Qty: 90 TABLET | Refills: 1 | Status: SHIPPED | OUTPATIENT
Start: 2020-06-29 | End: 2020-12-18

## 2020-08-04 DIAGNOSIS — I10 ESSENTIAL HYPERTENSION: ICD-10-CM

## 2020-08-04 DIAGNOSIS — R73.03 PREDIABETES: ICD-10-CM

## 2020-08-04 DIAGNOSIS — Z13.220 SCREENING FOR LIPID DISORDERS: ICD-10-CM

## 2020-08-04 LAB
A/G RATIO: 1.7 (ref 1.1–2.2)
ALBUMIN SERPL-MCNC: 4.3 G/DL (ref 3.4–5)
ALP BLD-CCNC: 96 U/L (ref 40–129)
ALT SERPL-CCNC: 16 U/L (ref 10–40)
ANION GAP SERPL CALCULATED.3IONS-SCNC: 14 MMOL/L (ref 3–16)
AST SERPL-CCNC: 17 U/L (ref 15–37)
BILIRUB SERPL-MCNC: <0.2 MG/DL (ref 0–1)
BUN BLDV-MCNC: 12 MG/DL (ref 7–20)
CALCIUM SERPL-MCNC: 9.3 MG/DL (ref 8.3–10.6)
CHLORIDE BLD-SCNC: 104 MMOL/L (ref 99–110)
CHOLESTEROL, TOTAL: 171 MG/DL (ref 0–199)
CO2: 25 MMOL/L (ref 21–32)
CREAT SERPL-MCNC: 0.9 MG/DL (ref 0.6–1.2)
GFR AFRICAN AMERICAN: >60
GFR NON-AFRICAN AMERICAN: >60
GLOBULIN: 2.5 G/DL
GLUCOSE BLD-MCNC: 78 MG/DL (ref 70–99)
HDLC SERPL-MCNC: 70 MG/DL (ref 40–60)
LDL CHOLESTEROL CALCULATED: 86 MG/DL
POTASSIUM SERPL-SCNC: 3.9 MMOL/L (ref 3.5–5.1)
SODIUM BLD-SCNC: 143 MMOL/L (ref 136–145)
TOTAL PROTEIN: 6.8 G/DL (ref 6.4–8.2)
TRIGL SERPL-MCNC: 75 MG/DL (ref 0–150)
VLDLC SERPL CALC-MCNC: 15 MG/DL

## 2020-08-05 LAB
ESTIMATED AVERAGE GLUCOSE: 119.8 MG/DL
HBA1C MFR BLD: 5.8 %

## 2020-08-11 ENCOUNTER — OFFICE VISIT (OUTPATIENT)
Dept: FAMILY MEDICINE CLINIC | Age: 70
End: 2020-08-11
Payer: MEDICARE

## 2020-08-11 VITALS
WEIGHT: 151 LBS | HEART RATE: 84 BPM | DIASTOLIC BLOOD PRESSURE: 78 MMHG | TEMPERATURE: 97 F | SYSTOLIC BLOOD PRESSURE: 122 MMHG | RESPIRATION RATE: 12 BRPM | OXYGEN SATURATION: 97 % | BODY MASS INDEX: 30.5 KG/M2

## 2020-08-11 PROBLEM — R73.03 PREDIABETES: Status: ACTIVE | Noted: 2020-08-11

## 2020-08-11 PROCEDURE — 4040F PNEUMOC VAC/ADMIN/RCVD: CPT | Performed by: INTERNAL MEDICINE

## 2020-08-11 PROCEDURE — G8417 CALC BMI ABV UP PARAM F/U: HCPCS | Performed by: INTERNAL MEDICINE

## 2020-08-11 PROCEDURE — 99214 OFFICE O/P EST MOD 30 MIN: CPT | Performed by: INTERNAL MEDICINE

## 2020-08-11 PROCEDURE — 3017F COLORECTAL CA SCREEN DOC REV: CPT | Performed by: INTERNAL MEDICINE

## 2020-08-11 PROCEDURE — G8427 DOCREV CUR MEDS BY ELIG CLIN: HCPCS | Performed by: INTERNAL MEDICINE

## 2020-08-11 PROCEDURE — 1090F PRES/ABSN URINE INCON ASSESS: CPT | Performed by: INTERNAL MEDICINE

## 2020-08-11 PROCEDURE — G8926 SPIRO NO PERF OR DOC: HCPCS | Performed by: INTERNAL MEDICINE

## 2020-08-11 PROCEDURE — 3023F SPIROM DOC REV: CPT | Performed by: INTERNAL MEDICINE

## 2020-08-11 PROCEDURE — 1036F TOBACCO NON-USER: CPT | Performed by: INTERNAL MEDICINE

## 2020-08-11 PROCEDURE — G8399 PT W/DXA RESULTS DOCUMENT: HCPCS | Performed by: INTERNAL MEDICINE

## 2020-08-11 PROCEDURE — 1123F ACP DISCUSS/DSCN MKR DOCD: CPT | Performed by: INTERNAL MEDICINE

## 2020-08-11 ASSESSMENT — ENCOUNTER SYMPTOMS
SHORTNESS OF BREATH: 0
COUGH: 0
VOMITING: 0
NAUSEA: 0

## 2020-08-11 NOTE — PATIENT INSTRUCTIONS
Lab Results   Component Value Date    LABA1C 5.8 08/04/2020    LABA1C 5.0 03/12/2018    LABA1C 5.3 07/26/2017     There is a great carbohydrate book at this website  Bo[employer]able/about-diabetes/tools-and-resources.html?id=resources-accordion-group-Educational-Booklets  Please go to this website to sign up for free information in both [place] and [place]:    [email or web address] org/diabetes/type-2/living-with-type-[email or web address]-diabetes-program      SHE NEEDS TETANUS TDAP AND SHINGLES VACCINE AT PHARMACY

## 2020-08-11 NOTE — PROGRESS NOTES
2020    Chief Complaint   Patient presents with    Hypertension       HPI  HTN   PREDIABETES    Lab Results   Component Value Date    LABA1C 5.8 2020    LABA1C 5.0 2018    LABA1C 5.3 2017   COOKS RICE ONCE A WEEK, MASHED POTATOES  ONCE WEEK  SALAD ,     EATS NUTRIGRAIN    LIKES GRAPES  EATS ONE MEAL BUT EATS A BAR OTHER SNACKS. PINEAPPLE. CORN FLAKES IN THE AM OR FOR DINNER    MOM AND SISTER WITH DM     HTN  CKING BP AT HOME  HAS BEEN FINE    SON TAKING HER TO APPT AND GROCERY  SON IS  46    HAS 2 SONS     STILL HAS REDNESS IN THE RIGHT LEG      Review of Systems   Constitutional: Negative for chills and fever. Respiratory: Negative for cough and shortness of breath. Gastrointestinal: Negative for nausea and vomiting. Neurological: Negative for dizziness and light-headedness.        Health Maintenance   Topic Date Due    Shingles Vaccine (2 of 3) 2016    DTaP/Tdap/Td vaccine (2 - Td) 2019    Annual Wellness Visit (AWV)  2019    Flu vaccine (1) 2020    A1C test (Diabetic or Prediabetic)  2021    Potassium monitoring  2021    Creatinine monitoring  2021    Breast cancer screen  2021    Lipid screen  2025    Colon cancer screen colonoscopy  2028    Pneumococcal 65+ years Vaccine  Completed    Hepatitis C screen  Completed    DEXA (modify frequency per FRAX score)  Addressed    Hepatitis A vaccine  Aged Out    Hepatitis B vaccine  Aged Out    Hib vaccine  Aged Out    Meningococcal (ACWY) vaccine  Aged Out      Social History     Tobacco Use    Smoking status: Former Smoker     Packs/day: 0.50     Years: 11.00     Pack years: 5.50     Types: Cigarettes     Start date: 1966     Last attempt to quit: 8/3/2015     Years since quittin.0    Smokeless tobacco: Never Used    Tobacco comment: counseled on tobacco exposure avoidance   Substance Use Topics    Alcohol use: No     Alcohol/week: 0.0 standard drinks  Drug use: No      Family History   Problem Relation Age of Onset    Other Father         Tuberculosis in his 34\"s in [address] Diabetes Mother     Coronary Art Dis Mother     Diabetes Sister     High Blood Pressure Sister     Coronary Art Dis Sister      Prior to Visit Medications    Medication Sig Taking?  Authorizing Provider   furosemide (LASIX) 20 MG tablet TAKE 1 TABLET BY MOUTH EVERY DAY Yes Sarah Starr MD   amLODIPine (NORVASC) 10 MG tablet TAKE 1 TABLET BY MOUTH EVERY DAY Yes Sarah Starr MD   albuterol sulfate HFA (VENTOLIN HFA) 108 (90 Base) MCG/ACT inhaler Inhale 2 puffs into the lungs every 6 hours as needed for Wheezing Indications: Ventolin per PT Yes Sarah Starr MD   fluticasone-umeclidin-vilant (TRELEGY ELLIPTA) 100-62.5-25 MCG/INH AEPB Inhale 1 puff into the lungs daily  Patient not taking: Reported on 8/11/2020  Sarah Starr MD   ipratropium-albuterol (DUONEB) 0.5-2.5 (3) MG/3ML SOLN nebulizer solution Take 3 mLs by nebulization every 4 hours as needed for Shortness of Breath  Patient not taking: Reported on 8/11/2020  Sarah Starr MD   Naproxen Sodium (ALEVE) 220 MG CAPS Take 1 tablet by mouth as needed for Pain  Historical Provider, MD   Respiratory Therapy Supplies (NEBULIZER/TUBING/MOUTHPIECE) KIT 1 kit by Does not apply route daily  Patient not taking: Reported on 8/11/2020  Tomas Spangler MD     Patient Active Problem List   Diagnosis    COPD (chronic obstructive pulmonary disease)--pfts show severe obst defect w severe decrease dlco  +bronchodilator response 6/14--repeat unchanged    Tobacco abuse-pt quit 7/15--cold turkey    Pulmonary nodule, left- on lung cancer screening program-  pet scan 6/16=unchanged billy nodule-little activity lung--pos activoity ge jct (still need to cont puln nod surv x 2 yrs)    Dependent edema--echo 10/15--nl ef--mild diast dysf--advised low salt diet/ stockings/ wt loss    Essential hypertension    Diastolic dysfunction--mild on echo 10/15--nl ef    Pustular psoriasis, Dr. Ralf Eid Centrilobular emphysema [facility])    Mediastinal adenopathy    SIRS (systemic inflammatory response syndrome) (HCC)    Leukocytosis    Ex-smoker    Tobacco abuse counseling    Elevated brain natriuretic peptide (BNP) level    Enlarged lymph node enlarged ing ly node 2018    Inguinal adenopathy work up  2018 Dr. Cruz Lau.  neg path     Edema of right lower extremity aug/sept  2018    Coronary artery calcification    S/P colonoscopy 9/18 [facility] - 8mm polyp    HGSIL (high grade squamous intraepithelial lesion) on Pap smear of cervix        LABS:   Lab Results   Component Value Date    GLUCOSE 78 08/04/2020     Lab Results   Component Value Date     08/04/2020    K 3.9 08/04/2020    CREATININE 0.9 08/04/2020     Cholesterol, Total   Date Value Ref Range Status   08/04/2020 171 0 - 199 mg/dL Final     LDL Calculated   Date Value Ref Range Status   08/04/2020 86 <100 mg/dL Final     HDL   Date Value Ref Range Status   08/04/2020 70 (H) 40 - 60 mg/dL Final     Triglycerides   Date Value Ref Range Status   08/04/2020 75 0 - 150 mg/dL Final     Lab Results   Component Value Date    ALT 16 08/04/2020    AST 17 08/04/2020    ALKPHOS 96 08/04/2020    BILITOT <0.2 08/04/2020      Lab Results   Component Value Date    WBC 9.3 09/11/2018    HGB 12.9 09/11/2018    HCT 39.6 09/11/2018    MCV 83.6 09/11/2018     09/11/2018     No results found for: TSH  Lab Results   Component Value Date    LABA1C 5.8 08/04/2020     No results found for: PSA, PSADIA     PHYSICAL EXAM:  /78 (Site: Right Upper Arm, Position: Sitting, Cuff Size: Medium Adult)   Pulse 84   Temp 97 °F (36.1 °C)   Resp 12   Wt 151 lb (68.5 kg)   LMP  (LMP Unknown)   SpO2 97%   BMI 30.50 kg/m²    Physical Exam  Constitutional:       Appearance: Normal appearance. She is well-developed. She is obese. HENT:      Head: Normocephalic and atraumatic. Cardiovascular:      Rate and Rhythm: Normal rate and regular rhythm. Heart sounds: No murmur. Pulmonary:      Breath sounds: Normal breath sounds. No wheezing. Comments: VERY SLIGHT WHEEZE  Abdominal:      Palpations: Abdomen is soft. Tenderness: There is no abdominal tenderness. Skin:     General: Skin is warm and dry. Comments: PATCHY RED AREAS BOTH LOWER EXT  TRACE EDEMA ON THE RIGHT      Neurological:      Mental Status: She is alert. Psychiatric:         Mood and Affect: Mood normal.         Behavior: Behavior normal.         Thought Content: Thought content normal.         Judgment: Judgment normal.       BP Readings from Last 5 Encounters:   08/11/20 122/78   03/09/20 130/74   12/27/19 139/74   12/09/19 (!) 142/82   10/02/19 124/72       Wt Readings from Last 5 Encounters:   08/11/20 151 lb (68.5 kg)   03/09/20 159 lb 12.8 oz (72.5 kg)   12/27/19 156 lb 9.6 oz (71 kg)   12/09/19 158 lb 3.2 oz (71.8 kg)   10/02/19 155 lb (70.3 kg)      Graciela Perry was seen today for hypertension. Diagnoses and all orders for this visit:    Prediabetes  -     Ambulatory Referral To Diabetes Education    Pustular psoriasis, Dr. Ginette Lo    Centrilobular emphysema [facility])    Essential hypertension    PREDIABETES IS NEW  WILL NEED TO SEE YAQUELIN BUCK  BP IS GOOD  SKIN LOOKS OVERALL STABLE  FU IN  4MONTHS    Patient Instructions     Lab Results   Component Value Date    LABA1C 5.8 08/04/2020    LABA1C 5.0 03/12/2018    LABA1C 5.3 07/26/2017     There is a great carbohydrate book at this website  Yorxs/about-diabetes/tools-and-resources.html?id=resources-accordion-group-Educational-Booklets  Please go to this website to sign up for free information in both [place] and [place]:    [email or web address].[email or web address] org/diabetes/type-2/living-with-type-2-diabetes-program      SHE NEEDS TETANUS TDAP AND SHINGLES VACCINE AT PHARMACY

## 2020-09-21 ENCOUNTER — APPOINTMENT (OUTPATIENT)
Dept: CT IMAGING | Age: 70
End: 2020-09-21
Payer: MEDICARE

## 2020-09-21 ENCOUNTER — APPOINTMENT (OUTPATIENT)
Dept: GENERAL RADIOLOGY | Age: 70
End: 2020-09-21
Payer: MEDICARE

## 2020-09-21 ENCOUNTER — ANESTHESIA EVENT (OUTPATIENT)
Dept: OPERATING ROOM | Age: 70
End: 2020-09-21
Payer: MEDICARE

## 2020-09-21 ENCOUNTER — HOSPITAL ENCOUNTER (OUTPATIENT)
Age: 70
Discharge: HOME | End: 2020-09-22
Attending: STUDENT IN AN ORGANIZED HEALTH CARE EDUCATION/TRAINING PROGRAM | Admitting: SURGERY
Payer: MEDICARE

## 2020-09-21 ENCOUNTER — APPOINTMENT (OUTPATIENT)
Dept: ULTRASOUND IMAGING | Age: 70
End: 2020-09-21
Payer: MEDICARE

## 2020-09-21 ENCOUNTER — ANESTHESIA (OUTPATIENT)
Dept: OPERATING ROOM | Age: 70
End: 2020-09-21
Payer: MEDICARE

## 2020-09-21 VITALS
RESPIRATION RATE: 1 BRPM | SYSTOLIC BLOOD PRESSURE: 138 MMHG | DIASTOLIC BLOOD PRESSURE: 67 MMHG | TEMPERATURE: 96.8 F | OXYGEN SATURATION: 100 %

## 2020-09-21 PROBLEM — K81.0 ACUTE CHOLECYSTITIS: Status: ACTIVE | Noted: 2020-09-21

## 2020-09-21 LAB
A/G RATIO: 1.1 (ref 1.1–2.2)
ALBUMIN SERPL-MCNC: 4 G/DL (ref 3.4–5)
ALP BLD-CCNC: 109 U/L (ref 40–129)
ALT SERPL-CCNC: 19 U/L (ref 10–40)
ANION GAP SERPL CALCULATED.3IONS-SCNC: 11 MMOL/L (ref 3–16)
AST SERPL-CCNC: 23 U/L (ref 15–37)
BACTERIA: ABNORMAL /HPF
BASE EXCESS VENOUS: 0.7 MMOL/L
BASOPHILS ABSOLUTE: 0.1 K/UL (ref 0–0.2)
BASOPHILS RELATIVE PERCENT: 0.6 %
BILIRUB SERPL-MCNC: 0.4 MG/DL (ref 0–1)
BILIRUBIN URINE: ABNORMAL
BLOOD, URINE: NEGATIVE
BUN BLDV-MCNC: 8 MG/DL (ref 7–20)
CALCIUM SERPL-MCNC: 9.5 MG/DL (ref 8.3–10.6)
CARBOXYHEMOGLOBIN: 0.8 %
CHLORIDE BLD-SCNC: 104 MMOL/L (ref 99–110)
CLARITY: ABNORMAL
CO2: 23 MMOL/L (ref 21–32)
COLOR: YELLOW
COMMENT UA: ABNORMAL
CREAT SERPL-MCNC: 1 MG/DL (ref 0.6–1.2)
EKG ATRIAL RATE: 76 BPM
EKG DIAGNOSIS: NORMAL
EKG P AXIS: 49 DEGREES
EKG P-R INTERVAL: 134 MS
EKG Q-T INTERVAL: 374 MS
EKG QRS DURATION: 74 MS
EKG QTC CALCULATION (BAZETT): 420 MS
EKG R AXIS: 19 DEGREES
EKG T AXIS: 26 DEGREES
EKG VENTRICULAR RATE: 76 BPM
EOSINOPHILS ABSOLUTE: 0 K/UL (ref 0–0.6)
EOSINOPHILS RELATIVE PERCENT: 0.2 %
EPITHELIAL CELLS, UA: ABNORMAL /HPF (ref 0–5)
FINE CASTS, UA: ABNORMAL /LPF (ref 0–2)
GFR AFRICAN AMERICAN: >60
GFR NON-AFRICAN AMERICAN: 54
GLOBULIN: 3.5 G/DL
GLUCOSE BLD-MCNC: 111 MG/DL (ref 70–99)
GLUCOSE URINE: NEGATIVE MG/DL
HCO3 VENOUS: 26 MMOL/L (ref 23–29)
HCT VFR BLD CALC: 41.2 % (ref 36–48)
HEMOGLOBIN: 13.3 G/DL (ref 12–16)
HYALINE CASTS: ABNORMAL /LPF (ref 0–2)
KETONES, URINE: ABNORMAL MG/DL
LACTIC ACID: 1.5 MMOL/L (ref 0.4–2)
LEUKOCYTE ESTERASE, URINE: ABNORMAL
LIPASE: 16 U/L (ref 13–60)
LYMPHOCYTES ABSOLUTE: 1.7 K/UL (ref 1–5.1)
LYMPHOCYTES RELATIVE PERCENT: 13.6 %
MCH RBC QN AUTO: 26.3 PG (ref 26–34)
MCHC RBC AUTO-ENTMCNC: 32.4 G/DL (ref 31–36)
MCV RBC AUTO: 81.3 FL (ref 80–100)
METHEMOGLOBIN VENOUS: 0.4 %
MICROSCOPIC EXAMINATION: YES
MONOCYTES ABSOLUTE: 1 K/UL (ref 0–1.3)
MONOCYTES RELATIVE PERCENT: 8.1 %
NEUTROPHILS ABSOLUTE: 9.8 K/UL (ref 1.7–7.7)
NEUTROPHILS RELATIVE PERCENT: 77.5 %
NITRITE, URINE: NEGATIVE
O2 CONTENT, VEN: 13 ML/DL
O2 SAT, VEN: 72 %
O2 THERAPY: NORMAL
PCO2, VEN: 44 MMHG (ref 40–50)
PDW BLD-RTO: 14.6 % (ref 12.4–15.4)
PH UA: 7.5 (ref 5–8)
PH VENOUS: 7.38 (ref 7.35–7.45)
PLATELET # BLD: 241 K/UL (ref 135–450)
PMV BLD AUTO: 9.7 FL (ref 5–10.5)
PO2, VEN: 37 MMHG
POTASSIUM REFLEX MAGNESIUM: 4.1 MMOL/L (ref 3.5–5.1)
PRO-BNP: 605 PG/ML (ref 0–124)
PROTEIN UA: 100 MG/DL
RBC # BLD: 5.06 M/UL (ref 4–5.2)
RBC UA: ABNORMAL /HPF (ref 0–4)
SARS-COV-2, NAAT: NOT DETECTED
SODIUM BLD-SCNC: 138 MMOL/L (ref 136–145)
SPECIFIC GRAVITY UA: 1.02 (ref 1–1.03)
TCO2 CALC VENOUS: 28 MMOL/L
TOTAL PROTEIN: 7.5 G/DL (ref 6.4–8.2)
TROPONIN: <0.01 NG/ML
URINE REFLEX TO CULTURE: ABNORMAL
URINE TYPE: ABNORMAL
UROBILINOGEN, URINE: 1 E.U./DL
WBC # BLD: 12.6 K/UL (ref 4–11)
WBC UA: ABNORMAL /HPF (ref 0–5)

## 2020-09-21 PROCEDURE — 47563 LAPARO CHOLECYSTECTOMY/GRAPH: CPT | Performed by: SURGERY

## 2020-09-21 PROCEDURE — 93005 ELECTROCARDIOGRAM TRACING: CPT | Performed by: PHYSICIAN ASSISTANT

## 2020-09-21 PROCEDURE — 94640 AIRWAY INHALATION TREATMENT: CPT

## 2020-09-21 PROCEDURE — 2500000003 HC RX 250 WO HCPCS: Performed by: NURSE ANESTHETIST, CERTIFIED REGISTERED

## 2020-09-21 PROCEDURE — 96365 THER/PROPH/DIAG IV INF INIT: CPT

## 2020-09-21 PROCEDURE — 80053 COMPREHEN METABOLIC PANEL: CPT

## 2020-09-21 PROCEDURE — 84484 ASSAY OF TROPONIN QUANT: CPT

## 2020-09-21 PROCEDURE — 96367 TX/PROPH/DG ADDL SEQ IV INF: CPT

## 2020-09-21 PROCEDURE — 7100000000 HC PACU RECOVERY - FIRST 15 MIN: Performed by: SURGERY

## 2020-09-21 PROCEDURE — 93010 ELECTROCARDIOGRAM REPORT: CPT | Performed by: INTERNAL MEDICINE

## 2020-09-21 PROCEDURE — 83690 ASSAY OF LIPASE: CPT

## 2020-09-21 PROCEDURE — 7100000001 HC PACU RECOVERY - ADDTL 15 MIN: Performed by: SURGERY

## 2020-09-21 PROCEDURE — 2500000003 HC RX 250 WO HCPCS: Performed by: EMERGENCY MEDICINE

## 2020-09-21 PROCEDURE — 2580000003 HC RX 258: Performed by: SURGERY

## 2020-09-21 PROCEDURE — APPSS180 APP SPLIT SHARED TIME > 60 MINUTES: Performed by: PHYSICIAN ASSISTANT

## 2020-09-21 PROCEDURE — 76705 ECHO EXAM OF ABDOMEN: CPT

## 2020-09-21 PROCEDURE — 2500000003 HC RX 250 WO HCPCS: Performed by: SURGERY

## 2020-09-21 PROCEDURE — 88304 TISSUE EXAM BY PATHOLOGIST: CPT

## 2020-09-21 PROCEDURE — 85025 COMPLETE CBC W/AUTO DIFF WBC: CPT

## 2020-09-21 PROCEDURE — 2580000003 HC RX 258: Performed by: NURSE ANESTHETIST, CERTIFIED REGISTERED

## 2020-09-21 PROCEDURE — 3600000004 HC SURGERY LEVEL 4 BASE: Performed by: SURGERY

## 2020-09-21 PROCEDURE — 74300 X-RAY BILE DUCTS/PANCREAS: CPT

## 2020-09-21 PROCEDURE — 6370000000 HC RX 637 (ALT 250 FOR IP): Performed by: SURGERY

## 2020-09-21 PROCEDURE — 36415 COLL VENOUS BLD VENIPUNCTURE: CPT

## 2020-09-21 PROCEDURE — 6360000002 HC RX W HCPCS: Performed by: NURSE ANESTHETIST, CERTIFIED REGISTERED

## 2020-09-21 PROCEDURE — 74177 CT ABD & PELVIS W/CONTRAST: CPT

## 2020-09-21 PROCEDURE — 81001 URINALYSIS AUTO W/SCOPE: CPT

## 2020-09-21 PROCEDURE — 83605 ASSAY OF LACTIC ACID: CPT

## 2020-09-21 PROCEDURE — 3700000000 HC ANESTHESIA ATTENDED CARE: Performed by: SURGERY

## 2020-09-21 PROCEDURE — 71045 X-RAY EXAM CHEST 1 VIEW: CPT

## 2020-09-21 PROCEDURE — 6360000002 HC RX W HCPCS: Performed by: EMERGENCY MEDICINE

## 2020-09-21 PROCEDURE — 99222 1ST HOSP IP/OBS MODERATE 55: CPT | Performed by: SURGERY

## 2020-09-21 PROCEDURE — 99285 EMERGENCY DEPT VISIT HI MDM: CPT

## 2020-09-21 PROCEDURE — 96366 THER/PROPH/DIAG IV INF ADDON: CPT

## 2020-09-21 PROCEDURE — 82803 BLOOD GASES ANY COMBINATION: CPT

## 2020-09-21 PROCEDURE — APPNB180 APP NON BILLABLE TIME > 60 MINS: Performed by: PHYSICIAN ASSISTANT

## 2020-09-21 PROCEDURE — 83880 ASSAY OF NATRIURETIC PEPTIDE: CPT

## 2020-09-21 PROCEDURE — 6360000004 HC RX CONTRAST MEDICATION

## 2020-09-21 PROCEDURE — 2709999900 HC NON-CHARGEABLE SUPPLY: Performed by: SURGERY

## 2020-09-21 PROCEDURE — 6360000004 HC RX CONTRAST MEDICATION: Performed by: SURGERY

## 2020-09-21 PROCEDURE — 2720000010 HC SURG SUPPLY STERILE: Performed by: SURGERY

## 2020-09-21 PROCEDURE — U0002 COVID-19 LAB TEST NON-CDC: HCPCS

## 2020-09-21 PROCEDURE — 3600000014 HC SURGERY LEVEL 4 ADDTL 15MIN: Performed by: SURGERY

## 2020-09-21 PROCEDURE — 1200000000 HC SEMI PRIVATE

## 2020-09-21 PROCEDURE — 3700000001 HC ADD 15 MINUTES (ANESTHESIA): Performed by: SURGERY

## 2020-09-21 RX ORDER — DEXAMETHASONE SODIUM PHOSPHATE 4 MG/ML
INJECTION, SOLUTION INTRA-ARTICULAR; INTRALESIONAL; INTRAMUSCULAR; INTRAVENOUS; SOFT TISSUE PRN
Status: DISCONTINUED | OUTPATIENT
Start: 2020-09-21 | End: 2020-09-21 | Stop reason: SDUPTHER

## 2020-09-21 RX ORDER — ONDANSETRON 2 MG/ML
4 INJECTION INTRAMUSCULAR; INTRAVENOUS EVERY 6 HOURS PRN
Status: DISCONTINUED | OUTPATIENT
Start: 2020-09-21 | End: 2020-09-22 | Stop reason: HOSPADM

## 2020-09-21 RX ORDER — OXYCODONE HYDROCHLORIDE AND ACETAMINOPHEN 5; 325 MG/1; MG/1
1 TABLET ORAL PRN
Status: DISCONTINUED | OUTPATIENT
Start: 2020-09-21 | End: 2020-09-21 | Stop reason: HOSPADM

## 2020-09-21 RX ORDER — SODIUM CHLORIDE 9 MG/ML
INJECTION, SOLUTION INTRAVENOUS CONTINUOUS
Status: DISCONTINUED | OUTPATIENT
Start: 2020-09-21 | End: 2020-09-22 | Stop reason: HOSPADM

## 2020-09-21 RX ORDER — SUCCINYLCHOLINE CHLORIDE 20 MG/ML
INJECTION INTRAMUSCULAR; INTRAVENOUS PRN
Status: DISCONTINUED | OUTPATIENT
Start: 2020-09-21 | End: 2020-09-21 | Stop reason: SDUPTHER

## 2020-09-21 RX ORDER — OXYCODONE HYDROCHLORIDE 10 MG/1
10 TABLET ORAL EVERY 4 HOURS PRN
Status: DISCONTINUED | OUTPATIENT
Start: 2020-09-21 | End: 2020-09-22 | Stop reason: HOSPADM

## 2020-09-21 RX ORDER — ONDANSETRON 2 MG/ML
INJECTION INTRAMUSCULAR; INTRAVENOUS PRN
Status: DISCONTINUED | OUTPATIENT
Start: 2020-09-21 | End: 2020-09-21 | Stop reason: SDUPTHER

## 2020-09-21 RX ORDER — SODIUM CHLORIDE 0.9 % (FLUSH) 0.9 %
10 SYRINGE (ML) INJECTION PRN
Status: DISCONTINUED | OUTPATIENT
Start: 2020-09-21 | End: 2020-09-22 | Stop reason: HOSPADM

## 2020-09-21 RX ORDER — OXYCODONE HYDROCHLORIDE 5 MG/1
5 TABLET ORAL EVERY 4 HOURS PRN
Status: DISCONTINUED | OUTPATIENT
Start: 2020-09-21 | End: 2020-09-22 | Stop reason: HOSPADM

## 2020-09-21 RX ORDER — AMLODIPINE BESYLATE 10 MG/1
10 TABLET ORAL DAILY
Status: DISCONTINUED | OUTPATIENT
Start: 2020-09-21 | End: 2020-09-22 | Stop reason: HOSPADM

## 2020-09-21 RX ORDER — GLYCOPYRROLATE 0.2 MG/ML
INJECTION INTRAMUSCULAR; INTRAVENOUS PRN
Status: DISCONTINUED | OUTPATIENT
Start: 2020-09-21 | End: 2020-09-21 | Stop reason: SDUPTHER

## 2020-09-21 RX ORDER — FUROSEMIDE 20 MG/1
20 TABLET ORAL DAILY
Status: DISCONTINUED | OUTPATIENT
Start: 2020-09-21 | End: 2020-09-22 | Stop reason: HOSPADM

## 2020-09-21 RX ORDER — ROCURONIUM BROMIDE 10 MG/ML
INJECTION, SOLUTION INTRAVENOUS PRN
Status: DISCONTINUED | OUTPATIENT
Start: 2020-09-21 | End: 2020-09-21 | Stop reason: SDUPTHER

## 2020-09-21 RX ORDER — FENTANYL CITRATE 50 UG/ML
INJECTION, SOLUTION INTRAMUSCULAR; INTRAVENOUS PRN
Status: DISCONTINUED | OUTPATIENT
Start: 2020-09-21 | End: 2020-09-21 | Stop reason: SDUPTHER

## 2020-09-21 RX ORDER — MORPHINE SULFATE 2 MG/ML
2 INJECTION, SOLUTION INTRAMUSCULAR; INTRAVENOUS
Status: DISCONTINUED | OUTPATIENT
Start: 2020-09-21 | End: 2020-09-22 | Stop reason: HOSPADM

## 2020-09-21 RX ORDER — BUPIVACAINE HYDROCHLORIDE 5 MG/ML
INJECTION, SOLUTION EPIDURAL; INTRACAUDAL
Status: COMPLETED | OUTPATIENT
Start: 2020-09-21 | End: 2020-09-21

## 2020-09-21 RX ORDER — FENTANYL CITRATE 50 UG/ML
25 INJECTION, SOLUTION INTRAMUSCULAR; INTRAVENOUS EVERY 5 MIN PRN
Status: DISCONTINUED | OUTPATIENT
Start: 2020-09-21 | End: 2020-09-21 | Stop reason: HOSPADM

## 2020-09-21 RX ORDER — BUDESONIDE AND FORMOTEROL FUMARATE DIHYDRATE 80; 4.5 UG/1; UG/1
2 AEROSOL RESPIRATORY (INHALATION) 2 TIMES DAILY
Status: DISCONTINUED | OUTPATIENT
Start: 2020-09-21 | End: 2020-09-22 | Stop reason: HOSPADM

## 2020-09-21 RX ORDER — IPRATROPIUM BROMIDE AND ALBUTEROL SULFATE 2.5; .5 MG/3ML; MG/3ML
1 SOLUTION RESPIRATORY (INHALATION) EVERY 4 HOURS PRN
Status: DISCONTINUED | OUTPATIENT
Start: 2020-09-21 | End: 2020-09-22 | Stop reason: HOSPADM

## 2020-09-21 RX ORDER — SODIUM CHLORIDE 9 MG/ML
INJECTION, SOLUTION INTRAVENOUS CONTINUOUS PRN
Status: DISCONTINUED | OUTPATIENT
Start: 2020-09-21 | End: 2020-09-21 | Stop reason: SDUPTHER

## 2020-09-21 RX ORDER — SODIUM CHLORIDE 0.9 % (FLUSH) 0.9 %
10 SYRINGE (ML) INJECTION EVERY 12 HOURS SCHEDULED
Status: DISCONTINUED | OUTPATIENT
Start: 2020-09-21 | End: 2020-09-22 | Stop reason: HOSPADM

## 2020-09-21 RX ORDER — ONDANSETRON 4 MG/1
4 TABLET, ORALLY DISINTEGRATING ORAL EVERY 8 HOURS PRN
Status: DISCONTINUED | OUTPATIENT
Start: 2020-09-21 | End: 2020-09-22 | Stop reason: HOSPADM

## 2020-09-21 RX ORDER — MAGNESIUM HYDROXIDE 1200 MG/15ML
LIQUID ORAL CONTINUOUS PRN
Status: COMPLETED | OUTPATIENT
Start: 2020-09-21 | End: 2020-09-21

## 2020-09-21 RX ORDER — CIPROFLOXACIN 2 MG/ML
400 INJECTION, SOLUTION INTRAVENOUS ONCE
Status: COMPLETED | OUTPATIENT
Start: 2020-09-21 | End: 2020-09-21

## 2020-09-21 RX ORDER — PROPOFOL 10 MG/ML
INJECTION, EMULSION INTRAVENOUS PRN
Status: DISCONTINUED | OUTPATIENT
Start: 2020-09-21 | End: 2020-09-21 | Stop reason: SDUPTHER

## 2020-09-21 RX ORDER — OXYCODONE HYDROCHLORIDE AND ACETAMINOPHEN 5; 325 MG/1; MG/1
2 TABLET ORAL PRN
Status: DISCONTINUED | OUTPATIENT
Start: 2020-09-21 | End: 2020-09-21 | Stop reason: HOSPADM

## 2020-09-21 RX ORDER — MIDAZOLAM HYDROCHLORIDE 1 MG/ML
INJECTION INTRAMUSCULAR; INTRAVENOUS PRN
Status: DISCONTINUED | OUTPATIENT
Start: 2020-09-21 | End: 2020-09-21 | Stop reason: SDUPTHER

## 2020-09-21 RX ORDER — ONDANSETRON 2 MG/ML
4 INJECTION INTRAMUSCULAR; INTRAVENOUS
Status: DISCONTINUED | OUTPATIENT
Start: 2020-09-21 | End: 2020-09-21 | Stop reason: HOSPADM

## 2020-09-21 RX ORDER — LIDOCAINE HYDROCHLORIDE 20 MG/ML
INJECTION, SOLUTION EPIDURAL; INFILTRATION; INTRACAUDAL; PERINEURAL PRN
Status: DISCONTINUED | OUTPATIENT
Start: 2020-09-21 | End: 2020-09-21 | Stop reason: SDUPTHER

## 2020-09-21 RX ADMIN — FENTANYL CITRATE 100 MCG: 50 INJECTION INTRAMUSCULAR; INTRAVENOUS at 13:46

## 2020-09-21 RX ADMIN — IOPAMIDOL 75 ML: 755 INJECTION, SOLUTION INTRAVENOUS at 10:44

## 2020-09-21 RX ADMIN — SUGAMMADEX 200 MG: 100 INJECTION, SOLUTION INTRAVENOUS at 14:26

## 2020-09-21 RX ADMIN — BUDESONIDE AND FORMOTEROL FUMARATE DIHYDRATE 2 PUFF: 80; 4.5 AEROSOL RESPIRATORY (INHALATION) at 20:06

## 2020-09-21 RX ADMIN — LIDOCAINE HYDROCHLORIDE 60 MG: 20 INJECTION, SOLUTION EPIDURAL; INFILTRATION; INTRACAUDAL; PERINEURAL at 13:49

## 2020-09-21 RX ADMIN — SUCCINYLCHOLINE CHLORIDE 100 MG: 20 INJECTION, SOLUTION INTRAMUSCULAR; INTRAVENOUS at 13:49

## 2020-09-21 RX ADMIN — FAMOTIDINE 20 MG: 10 INJECTION, SOLUTION INTRAVENOUS at 13:45

## 2020-09-21 RX ADMIN — PROPOFOL 120 MG: 10 INJECTION, EMULSION INTRAVENOUS at 13:49

## 2020-09-21 RX ADMIN — SODIUM CHLORIDE: 9 INJECTION, SOLUTION INTRAVENOUS at 13:44

## 2020-09-21 RX ADMIN — ONDANSETRON 4 MG: 2 INJECTION INTRAMUSCULAR; INTRAVENOUS at 13:55

## 2020-09-21 RX ADMIN — DEXAMETHASONE SODIUM PHOSPHATE 8 MG: 4 INJECTION, SOLUTION INTRAMUSCULAR; INTRAVENOUS at 13:55

## 2020-09-21 RX ADMIN — PHENYLEPHRINE HYDROCHLORIDE 200 MCG: 10 INJECTION INTRAVENOUS at 14:00

## 2020-09-21 RX ADMIN — MIDAZOLAM 2 MG: 1 INJECTION INTRAMUSCULAR; INTRAVENOUS at 13:45

## 2020-09-21 RX ADMIN — SODIUM CHLORIDE: 9 INJECTION, SOLUTION INTRAVENOUS at 16:19

## 2020-09-21 RX ADMIN — GLYCOPYRROLATE 0.2 MG: 0.2 INJECTION, SOLUTION INTRAMUSCULAR; INTRAVENOUS at 13:55

## 2020-09-21 RX ADMIN — ROCURONIUM BROMIDE 20 MG: 10 INJECTION INTRAVENOUS at 13:53

## 2020-09-21 RX ADMIN — METRONIDAZOLE 500 MG: 500 INJECTION, SOLUTION INTRAVENOUS at 12:41

## 2020-09-21 RX ADMIN — CIPROFLOXACIN 400 MG: 2 INJECTION, SOLUTION INTRAVENOUS at 11:33

## 2020-09-21 RX ADMIN — AMLODIPINE BESYLATE 10 MG: 10 TABLET ORAL at 16:19

## 2020-09-21 RX ADMIN — SODIUM CHLORIDE, PRESERVATIVE FREE 10 ML: 5 INJECTION INTRAVENOUS at 21:21

## 2020-09-21 ASSESSMENT — PAIN DESCRIPTION - PROGRESSION: CLINICAL_PROGRESSION: GRADUALLY IMPROVING

## 2020-09-21 ASSESSMENT — PULMONARY FUNCTION TESTS
PIF_VALUE: 17
PIF_VALUE: 4
PIF_VALUE: 14
PIF_VALUE: 1
PIF_VALUE: 26
PIF_VALUE: 14
PIF_VALUE: 22
PIF_VALUE: 27
PIF_VALUE: 23
PIF_VALUE: 24
PIF_VALUE: 1
PIF_VALUE: 25
PIF_VALUE: 2
PIF_VALUE: 20
PIF_VALUE: 20
PIF_VALUE: 2
PIF_VALUE: 25
PIF_VALUE: 26
PIF_VALUE: 14
PIF_VALUE: 2
PIF_VALUE: 14
PIF_VALUE: 2
PIF_VALUE: 20
PIF_VALUE: 25
PIF_VALUE: 21
PIF_VALUE: 10
PIF_VALUE: 25
PIF_VALUE: 25
PIF_VALUE: 1
PIF_VALUE: 0
PIF_VALUE: 21
PIF_VALUE: 4
PIF_VALUE: 25
PIF_VALUE: 26
PIF_VALUE: 20
PIF_VALUE: 25
PIF_VALUE: 20
PIF_VALUE: 24
PIF_VALUE: 19
PIF_VALUE: 1
PIF_VALUE: 21
PIF_VALUE: 2
PIF_VALUE: 1
PIF_VALUE: 17
PIF_VALUE: 25
PIF_VALUE: 27
PIF_VALUE: 32
PIF_VALUE: 25
PIF_VALUE: 21
PIF_VALUE: 26

## 2020-09-21 ASSESSMENT — PAIN SCALES - GENERAL
PAINLEVEL_OUTOF10: 0
PAINLEVEL_OUTOF10: 0
PAINLEVEL_OUTOF10: 7
PAINLEVEL_OUTOF10: 3
PAINLEVEL_OUTOF10: 0

## 2020-09-21 ASSESSMENT — PAIN - FUNCTIONAL ASSESSMENT
PAIN_FUNCTIONAL_ASSESSMENT: 0-10
PAIN_FUNCTIONAL_ASSESSMENT: PREVENTS OR INTERFERES SOME ACTIVE ACTIVITIES AND ADLS
PAIN_FUNCTIONAL_ASSESSMENT: PREVENTS OR INTERFERES SOME ACTIVE ACTIVITIES AND ADLS

## 2020-09-21 ASSESSMENT — PAIN DESCRIPTION - ONSET
ONSET: ON-GOING
ONSET: ON-GOING

## 2020-09-21 ASSESSMENT — PAIN DESCRIPTION - FREQUENCY
FREQUENCY: CONTINUOUS
FREQUENCY: CONTINUOUS

## 2020-09-21 ASSESSMENT — PAIN DESCRIPTION - ORIENTATION
ORIENTATION: MID
ORIENTATION: MID
ORIENTATION: ANTERIOR

## 2020-09-21 ASSESSMENT — ENCOUNTER SYMPTOMS: SHORTNESS OF BREATH: 0

## 2020-09-21 ASSESSMENT — PAIN DESCRIPTION - LOCATION
LOCATION: ABDOMEN

## 2020-09-21 ASSESSMENT — PAIN DESCRIPTION - DESCRIPTORS
DESCRIPTORS: ACHING
DESCRIPTORS: BURNING
DESCRIPTORS: BURNING
DESCRIPTORS: SORE

## 2020-09-21 ASSESSMENT — PAIN DESCRIPTION - PAIN TYPE
TYPE: SURGICAL PAIN
TYPE: SURGICAL PAIN;ACUTE PAIN
TYPE: ACUTE PAIN

## 2020-09-21 NOTE — PLAN OF CARE
Problem: Falls - Risk of:  Goal: Will remain free from falls  Description: Will remain free from falls  Outcome: Ongoing  Note: Fall risk assessment completed. Fall precautions in place. Call light within reach. Pt educated on calling for assistance before getting up. Walkway free of clutter. Will continue to monitor. Electronically signed by Sally Chin RN on 9/21/2020 at 5:40 PM      Problem: Activity:  Goal: Risk for activity intolerance will decrease  Description: Risk for activity intolerance will decrease  Outcome: Ongoing     Problem: Sensory:  Goal: Pain level will decrease  Description: Pain level will decrease  Outcome: Ongoing  Note: Educated patient on pain management. Will assess patients pain level per unit protocol, and provide pain management measures as needed.    Electronically signed by Sally Chin RN on 9/21/2020 at 5:40 PM

## 2020-09-21 NOTE — ANESTHESIA PRE PROCEDURE
+bronchodilator response 6/14--repeat unchanged J44.9    Tobacco abuse-pt quit 7/15--cold turkey Z72.0    Pulmonary nodule, left- on lung cancer screening program-  pet scan 6/16=unchanged billy nodule-little activity lung--pos activoity ge jct (still need to cont puln nod surv x 2 yrs) R91.1    Dependent edema--echo 10/15--nl ef--mild diast dysf--advised low salt diet/ stockings/ wt loss R60.9    Essential hypertension K91    Diastolic dysfunction--mild on echo 10/15--nl ef I51.89    Pustular psoriasis, Dr. Berrios Part L40.1    Centrilobular emphysema ([address] J43.2    Mediastinal adenopathy R59.0    SIRS (systemic inflammatory response syndrome) (HCC) R65.10    Leukocytosis D72.829    Ex-smoker Z87.891    Tobacco abuse counseling Z71.6    Elevated brain natriuretic peptide (BNP) level R79.89    Enlarged lymph node enlarged ing ly node 2018 R59.9    Inguinal adenopathy work up  2018 Dr. Josef Flores.  neg path  R59.0    Edema of right lower extremity aug/sept  2018 R60.0    Coronary artery calcification I25.10, I25.84    S/P colonoscopy 9/18 [place] - 8mm polyp Z98.890    HGSIL (high grade squamous intraepithelial lesion) on Pap smear of cervix R87.613    Prediabetes R73.03    Acute cholecystitis K81.0       Past Medical History:        Diagnosis Date    COPD (chronic obstructive pulmonary disease) (HCC)     Glaucoma     Hx of blood clots     small-right leg    Hypertension        Past Surgical History:        Procedure Laterality Date    CERVIX BIOPSY N/A 1/3/2019    COLD KNIFE CONIZATION, ENDOCERVICAL CURETTAGE performed by Suzy Vásquez MD at [address] [place] [address]  09/20/2018    Dr. Lockett Button EXTRACTION Right 7/18/2019    PHACOEMULSIFICATION WITH INTRAOCULAR LENS IMPLANT performed by Samantha Umaña MD at [place] [address] Left 8/1/2019    PHACOEMULSIFICATION WITH INTRAOCULAR TORIC LENS IMPLANT performed by Samantha Umaña MD (-) diabetes mellitus, hypothyroidism, hyperthyroidism, blood dyscrasia               Abdominal:           Vascular:   + DVT, . Anesthesia Plan      general     ASA 3       Induction: intravenous. MIPS: Postoperative opioids intended and Prophylactic antiemetics administered. Anesthetic plan and risks discussed with patient. Plan discussed with CRNA. This pre-anesthesia assessment may be used as a history and physical.    DOS STAFF ADDENDUM:    Pt seen and examined, chart reviewed (including anesthesia, drug and allergy history). No interval changes to history and physical examination. Anesthetic plan, risks, benefits, alternatives, and personnel involved discussed with patient. Patient verbalized an understanding and agrees to proceed.       Mayi Pruett MD  September 21, 2020  1:42 PM        Mayi Pruett MD   9/21/2020

## 2020-09-21 NOTE — OP NOTE
Laparoscopic Cholecystectomy Operative Report      Patient: Kezia Dumont MRN: [id number]     YOB: 1950  Age: 65 y.o. Sex: female        Primary Care Physician: Lashonda Morris MD         DATE OF OPERATION: 9/21/2020     PREOPERATIVE DIAGNOSIS: acute cholecystitis    POSTOPERATIVE DIAGNOSIS: acute cholecystitis    PROCEDURE PERFORMED: Laparoscopic cholecystectomy with cholangiogram    SURGEON: Jerry Ramos MD    ANESTHESIA: GETA with local anesthetic    ASA CLASS: 3    DVT PROPHYLAXIS: BLE pneumatic compression devices    ANTIBIOTICS: cipro and flagyl IV    INDICATIONS: Symptomatic gallbladder disease. The patient has an ultrasound that does show gallstones. The patient's symptoms were felt to be secondary to gallbladder disease and they presented through the ED for removal after the risks, benefits and alternatives were discussed with them. Procedure Details:       After informed consent was obtained, the patient was brought to the operating room and placed on the table in the supine position. Preoperative antibiotics were given. Once under general endotracheal anesthesia, the abdomen was prepped and sterilely draped in the standard fashion. A time-out was performed for patient and procedure verification. A small periumbilical incision was made just at the umbilicus and a  Veress needle inserted into peritoneal cavity. The abdomen was insufflated with carbon dioxide to a pressure of 15 mmHg. A 5mm trocar was introduced and a camera placed. Under direct vision, a 10mm port was placed in the subxiphoid location and two 5 mm ports placed in the right upper quadrant. The dome of the gallbladder was grasped and elevated up and over the liver after aspirating luminal contents with an ovarian needle. The patient had significant inflammation and adhesions. We bluntly took down the peritoneum over the infundibulum of the gallbladder.   The infundibulum was then grasped and retracted laterally exposing the triangle of Calot. We performed our dissection until the anatomy was very clear as we could see two and only two structures entering the gallbladder and completing our critical view of safety. The cystic duct was dissected toward the john exposing the common bile duct and common hepatic duct. A clip was placed on the gallbladder side of the cystic duct. A small stab incision was made just below the costal margin in the RUQ and the cholangiogram catheter was introduced. A ductotomy was made in the cystic duct and the catheter inserted. Cholangiogram showed contrast flow into the duodenum. Common bile duct, common hepatic duct, cystic duct and junction of the left and right hepatic ducts were well visualized. No filling defects were noted. The catheter was removed. The cystic duct was further cleared circumferentially was clipped proximally with 3 clips and divided. The artery was likewise identified, clipped and divided. The gallbladder was removed from the liver bed with cautery and removed through the epigastric port site. The abdomen was reinspected and found to have good hemostasis. The epigastric trocar site fascia was closed with a single suture of 0-Vicryl using a laparoscopic suture passer. The trocars were withdrawn and the skin closed with 4-0 Vicryl. Skin affix dermal adhesive was applied after injecting local anesthetic Patient was awoken and extubated without complication. All instrument counts were said to be correct. Findings: cholecystitis with stone in neck of GB.   Normal IOC    Estimated Blood Loss: less than 50 ml    Complications: none           Specimen: gallbladder and contents                  Electronically signed by Leeroy Scott MD on 9/21/2020 at 2:25 PM

## 2020-09-21 NOTE — ANESTHESIA POSTPROCEDURE EVALUATION
Department of Anesthesiology  Postprocedure Note    Patient: Ria Xiong  MRN: [id number]  YOB: 1950  Date of evaluation: 9/21/2020  Time:  5:32 PM     Procedure Summary     Date:  09/21/20 Room / Location:  [address]    Anesthesia Start:  7639 Anesthesia Stop:  7878    Procedure:  CHOLECYSTECTOMY LAPAROSCOPIC, WITH CHOLANGIOGRAM (N/A Abdomen) Diagnosis:  (ACUTE CHOLECYSTITIS)    Surgeon:  Eddie Mckenzie MD Responsible Provider:  Renetta Lund MD    Anesthesia Type:  general ASA Status:  3          Anesthesia Type: general    Irving Phase I: Irving Score: 10    Irving Phase II:      Last vitals: Reviewed and per EMR flowsheets.        Anesthesia Post Evaluation    Patient location during evaluation: PACU  Patient participation: complete - patient participated  Level of consciousness: awake and alert  Airway patency: patent  Nausea & Vomiting: no nausea and no vomiting  Complications: no  Cardiovascular status: hemodynamically stable  Respiratory status: acceptable  Hydration status: stable

## 2020-09-21 NOTE — PROGRESS NOTES
4 Eyes Admission Assessment     I agree as the admission nurse that 2 RN's have performed a thorough Head to Toe Skin Assessment on the patient. ALL assessment sites listed below have been assessed on admission. Areas assessed by both nurses: Yes  [x]   Head, Face, and Ears   [x]   Shoulders, Back, and Chest  [x]   Arms, Elbows, and Hands   [x]   Coccyx, Sacrum, and Ischum  [x]   Legs, Feet, and Heels        Does the Patient have Skin Breakdown?   No         Alexey Prevention initiated:  No   Wound Care Orders initiated:  No      [facility] nurse consulted for Pressure Injury (Stage 3,4, Unstageable, DTI, NWPT, and Complex wounds):  No      Nurse 1 eSignature: Electronically signed by Gerald Jones RN on 9/21/20 at 4:23 PM EDT    **SHARE this note so that the co-signing nurse is able to place an eSignature**    Nurse 2 eSignature: Electronically signed by David Galvan RN on 9/21/20 at 5:05 PM EDT

## 2020-09-21 NOTE — PROGRESS NOTES
Patient resting quietly in bed. Alert and oriented. Pain minimal at this time. IV fluids infusing. Dinner tray ordered. VSS. Patient with stedy gait and ambulates independently. Call light and belongings within reach. Patient able to make needs known. Will continue to monitor.      Electronically signed by Mike Guerrero RN on 9/21/2020 at 6:23 PM

## 2020-09-21 NOTE — H&P
Surgery Consult Note     Lucas Lucas PA-C  Pt Name: Mando Ward  MRN: [id number]  YOB: 1950  Date of evaluation: 9/21/2020  Primary Care Physician: Kun Spangler MD  Chief Complaint: abdominal pain  IMPRESSIONS:   1. Acute cholecystitis  2. WBC count 12.6  3. LFTs WNL  4. does not have any pertinent problems on file. PLANS:   1. Monitor and control pain   2. NPO  3. IVF  4. Antibiotics  5. Cholecystectomy  6. Treatment consent   7. COVID test for surgical clearance   SUBJECTIVE:   History of Chief Complaint:    Mando Ward is a 65 y.o. female who presents with epigastric abdominal pain. She stated that this pain began yesterday around 1900. She has had associated nausea and emesis. The pain continued in intensity and she came to the ER. In the ER a CT scan was performed that showed her to have distention of the gallbladder with wall enhancement and pericholecystic fluid. She then got an ultrasound and that showed her to have cholelithiasis and pericholecystic fluid concerning for acute cholecystitis. She denies having any other pain. Luis any CP, SOB, cough, lightheadedness or dizziness. Past Medical History  Reviewed  has a past medical history of COPD (chronic obstructive pulmonary disease) ([address] Glaucoma, Hx of blood clots, and Hypertension. Past Surgical History  Reviewed has a past surgical history that includes Colonoscopy (09/20/2018); Upper gastrointestinal endoscopy; pr biopsy/excision, lymph node(s) (Right, 10/1/2018); LEEP (10/25/2018); pr conization cervix,loop electrd (N/A, 10/25/2018); Cervix biopsy (N/A, 1/3/2019); Intracapsular cataract extraction (Right, 7/18/2019); and Intracapsular cataract extraction (Left, 8/1/2019). Medications  Prior to Admission medications    Medication Sig Start Date End Date Taking?  Authorizing Provider   furosemide (LASIX) 20 MG tablet TAKE 1 TABLET BY MOUTH EVERY DAY 6/29/20  Yes Josette Washington MD   amLODIPine (NORVASC) 10 MG tablet TAKE 1 TABLET BY MOUTH EVERY DAY 6/29/20  Yes Lorrie Felton MD   fluticasone-umeclidin-vilant (TRELEGY ELLIPTA) 250-86.7-14 MCG/INH AEPB Inhale 1 puff into the lungs daily 3/9/20  Yes Lorrie Felton MD   albuterol sulfate HFA (VENTOLIN HFA) 108 (90 Base) MCG/ACT inhaler Inhale 2 puffs into the lungs every 6 hours as needed for Wheezing Indications: Ventolin per PT 10/15/19  Yes Lorrie Felton MD   ipratropium-albuterol (DUONEB) 0.5-2.5 (3) MG/3ML SOLN nebulizer solution Take 3 mLs by nebulization every 4 hours as needed for Shortness of Breath 10/10/19  Yes Lorrie Felton MD   Naproxen Sodium (ALEVE) 220 MG CAPS Take 1 tablet by mouth as needed for Pain   Yes Historical Provider, MD    Scheduled Meds:   ciprofloxacin  400 mg Intravenous Once    metroNIDAZOLE  500 mg Intravenous Once     Continuous Infusions:  PRN Meds:. Allergies  is allergic to sulfa antibiotics. Family History  Reviewedfamily history includes Coronary Art Dis in her mother and sister; Diabetes in her mother and sister; High Blood Pressure in her sister; Other in her father. Social History   reports that she quit smoking about 5 years ago. Her smoking use included cigarettes. She started smoking about 54 years ago. She has a 5.50 pack-year smoking history. She has never used smokeless tobacco. She reports that she does not drink alcohol or use drugs. EDUCATION  Patient educated about their illness/diagnosis, stated above, and all questions answered. We discussed the importance of nutrition, medications they are taking, and healthy lifestyle.   Review of Systems:  General Denies any fever or chills  HEENT Denies any diplopia, tinnitus or vertigo  Resp Denies any shortness of breath, cough or wheezing  Cardiac Denies any chest pain, palpitations, claudication or edema  GI Denies any melena, hematochezia, hematemesis or pyrosis   Denies any frequency, urgency, hesitancy or incontinence  Heme Denies bruising or bleeding easily  Neuro 16.0     CBC:   Lab Results   Component Value Date    WBC 12.6 09/21/2020    RBC 5.06 09/21/2020    HGB 13.3 09/21/2020    HCT 41.2 09/21/2020    MCV 81.3 09/21/2020    MCH 26.3 09/21/2020    MCHC 32.4 09/21/2020    RDW 14.6 09/21/2020     09/21/2020    MPV 9.7 09/21/2020     CMP:    Lab Results   Component Value Date     09/21/2020    K 4.1 09/21/2020     09/21/2020    CO2 23 09/21/2020    BUN 8 09/21/2020    CREATININE 1.0 09/21/2020    GFRAA >60 09/21/2020    AGRATIO 1.1 09/21/2020    LABGLOM 54 09/21/2020    GLUCOSE 111 09/21/2020    PROT 7.5 09/21/2020    LABALBU 4.0 09/21/2020    CALCIUM 9.5 09/21/2020    BILITOT 0.4 09/21/2020    ALKPHOS 109 09/21/2020    AST 23 09/21/2020    ALT 19 09/21/2020     Urine Culture:  No components found for: CURINE  Blood Culture:  No components found for: CBLOOD, CFUNGUSBL  RADIOLOGY:     CT scan abd/pelvis:   Distention of the gallbladder with prominent enhancement of the wall of the    gallbladder and pericholecystic fluid.  Findings suggests acute cholecystitis      Ultrasound:     Cholelithiasis and pericholecystic fluid concerning for acute cholecystitis. Please correlate with clinical exam.     Diffuse hepatic steatosis. [facility]  General [facility] V[facility] [phone]  Electronically signed by PRASHANT Kitchen on 9/21/2020 at 11:30 AM      Surgery Staff  I have examined this patient and read and agree with the note by Martell Quintero PA-C from today. Chart, labs and imaging reviewed    A/P  Acute cholecystitis  OR for Laparoscopic cholecystectomy with cholangiogram, possible open  The risks, benefits and alternatives to the planned procedure were discussed. Patient expressed an understanding and is willing to proceed.   Will admit postop and likely D/C in AM      Electronically signed by Telma Nayak MD on 9/21/2020 at 12:51 PM

## 2020-09-21 NOTE — PROGRESS NOTES
Pt arrived to room 3126 from PACU. Vital signs taken and were WNL. Admission and assessment completed. Pt oriented to room, bed, phone, and call light. Fall prevention contract reviewed and signed. Fall precautions in place. Call light, bedside table and phone within easy reach. Pt instructed to use call light to call for assistance.

## 2020-09-21 NOTE — PROGRESS NOTES
Medication Reconciliation    List of medications patient is currently taking is complete. Source of information: 1. Conversation with patient at bedside                                      2. EPIC records      Notes regarding home medications:   1. Patient did not receive any of her home medications prior to arrival to the emergency department.     Augustine Pickard, Pharmacy Student 9/21/2020 11:20 AM

## 2020-09-21 NOTE — ED PROVIDER NOTES
Tuberculosis in his 30\"s in [address] [place] Diabetes Mother     Coronary Art Dis Mother     Diabetes Sister     High Blood Pressure Sister     Coronary Art Dis Sister           SOCIAL HISTORY       Social History     Tobacco Use    Smoking status: Former Smoker     Packs/day: 0.50     Years: 11.00     Pack years: 5.50     Types: Cigarettes     Start date: 1966     Last attempt to quit: 8/3/2015     Years since quittin.1    Smokeless tobacco: Never Used    Tobacco comment: counseled on tobacco exposure avoidance   Substance Use Topics    Alcohol use: No     Alcohol/week: 0.0 standard drinks    Drug use: No       SCREENINGS    Hilton Coma Scale  Eye Opening: Spontaneous  Best Verbal Response: Oriented  Best Motor Response: Obeys commands  Hilton Coma Scale Score: 15        PHYSICAL EXAM    (up to 7 for level 4, 8 or more for level 5)     ED Triage Vitals   BP Temp Temp Source Pulse Resp SpO2 Height Weight   20 0934 20 0934 20 0934 20 0934 20 0934 20 0934 20 0931 20 0931   (!) 156/70 98 °F (36.7 °C) Oral 100 16 97 % 4' 11\" (1.499 m) 148 lb 9.4 oz (67.4 kg)       Physical Exam  Vitals signs and nursing note reviewed. Constitutional:       Appearance: She is well-developed. She is obese. HENT:      Head: Normocephalic and atraumatic. Right Ear: External ear normal.      Left Ear: External ear normal.   Eyes:      General: No scleral icterus. Right eye: No discharge. Left eye: No discharge. Conjunctiva/sclera: Conjunctivae normal.   Neck:      Musculoskeletal: Normal range of motion and neck supple. Cardiovascular:      Rate and Rhythm: Normal rate and regular rhythm. Heart sounds: Normal heart sounds. Pulmonary:      Effort: Pulmonary effort is normal.      Breath sounds: Normal breath sounds. Abdominal:      General: Bowel sounds are normal. There is no abdominal bruit. Palpations: Abdomen is soft.  There is no pulsatile mass. Tenderness: There is abdominal tenderness in the right lower quadrant and epigastric area. There is no right CVA tenderness, left CVA tenderness or guarding. Negative signs include Qiu's sign. Comments: Patient with mild abdominal bloating. Tenderness noted more in the upper abdomen. No rebound or mass. Musculoskeletal: Normal range of motion. Right lower leg: Edema present. Comments: Patient with trace edema right lower extremity. This is her baseline. Skin:     General: Skin is warm and dry. Neurological:      General: No focal deficit present. Mental Status: She is alert and oriented to person, place, and time. Mental status is at baseline. Psychiatric:         Mood and Affect: Mood normal.         Behavior: Behavior normal.         Thought Content:  Thought content normal.         Judgment: Judgment normal.         DIAGNOSTIC RESULTS   LABS:    Labs Reviewed   CBC WITH AUTO DIFFERENTIAL - Abnormal; Notable for the following components:       Result Value    WBC 12.6 (*)     Neutrophils Absolute 9.8 (*)     All other components within normal limits    Narrative:     Performed at:  [facility]  [address] [facility] [address]   Phone [phone]   COMPREHENSIVE METABOLIC PANEL W/ REFLEX TO MG FOR LOW K - Abnormal; Notable for the following components:    Glucose 111 (*)     GFR Non- 54 (*)     All other components within normal limits    Narrative:     Performed at:  [facility]  [address] [employer] [address]   Phone [phone]   URINE RT REFLEX TO CULTURE - Abnormal; Notable for the following components:    Clarity, UA SL CLOUDY (*)     Bilirubin Urine SMALL (*)     Ketones, Urine TRACE (*)     Protein,  (*)     Leukocyte Esterase, Urine SMALL (*)     All other components within normal limits    Narrative:     Performed at:  [facility]) [facility]  [address] [facility] [place] [address]   Phone [phone]   BRAIN NATRIURETIC PEPTIDE - Abnormal; Notable for the following components:    Pro- (*)     All other components within normal limits    Narrative:     Performed at:  [facility]  [address] [facility] [place] [address]   Phone [phone]   MICROSCOPIC URINALYSIS - Abnormal; Notable for the following components:    Hyaline Casts, UA 3-5 (*)     Bacteria, UA 1+ (*)     All other components within normal limits    Narrative:     Performed at:  [place] [address] [facility] [place] [address]   Phone [phone]   LACTIC ACID, PLASMA    Narrative:     Performed at:  [address] [facility] [place] [address]   Phone [phone]   LIPASE    Narrative:     Performed at:  [facility]  [address] [place] [address] [facility] [place] [address]   Phone [phone]   TROPONIN    Narrative:     Performed at:  [facility]  [address] [place] [address] [facility] [place] [address]   Phone [phone]   BLOOD GAS, VENOUS    Narrative:     Performed at:  [address] [facility] [place] [address]   Phone [phone]    Narrative:     Performed at:  [facility]  [address] [place] [address] [facility] [place] [address]   Phone [phone]       All other labs were within normal range or not returned as of this dictation. EKG: All EKG's are interpreted by the Emergency Department Physician in the absence of a cardiologist.  Please see their note for interpretation of EKG.       RADIOLOGY:   Non-plain film images such as CT, Ultrasound and MRI are read by the radiologist. Plain radiographic images are visualized and preliminarily interpreted by the ED Provider with the below findings:        Interpretation per the Radiologist below, if available at the time of this note:    [address]   Preliminary Result   Cholelithiasis and pericholecystic fluid concerning for acute cholecystitis. Please correlate with clinical exam.      Diffuse hepatic steatosis. CT ABDOMEN PELVIS W IV CONTRAST Additional Contrast? None   Preliminary Result   Distention of the gallbladder with prominent enhancement of the wall of the   gallbladder and pericholecystic fluid. Findings suggests acute cholecystitis. XR CHEST PORTABLE   Final Result   1. No acute airspace consolidation. 2. Persistent 15 mm nodular density overlying the left lower lung zone, which   appears slightly increased in size in comparison with more remote studies. Consider a repeat chest CT, preferably with contrast, to re-evaluate this   nodule. No results found.         PROCEDURES   Unless otherwise noted below, none     Procedures    CRITICAL CARE TIME   N/A    CONSULTS:  IP CONSULT TO GENERAL SURGERY      EMERGENCY DEPARTMENT COURSE and DIFFERENTIAL DIAGNOSIS/MDM:   Vitals:    Vitals:    09/21/20 1202 09/21/20 1231 09/21/20 1242 09/21/20 1330   BP: (!) 148/67 (!) 143/64  (!) 140/84   Pulse:   88 91   Resp:    18   Temp:    96.2 °F (35.7 °C)   TempSrc:    Temporal   SpO2: 100% 99%  99%   Weight:       Height:           Patient was given the following medications:  Medications   metronidazole (FLAGYL) 500 mg in NaCl 100 mL IVPB premix (500 mg Intravenous New Bag 9/21/20 1241)   sodium chloride 0.9 % irrigation (1,000 mLs Irrigation New Bag 9/21/20 1315)   iopamidol (ISOVUE-370) 76 % injection 75 mL (75 mLs Intravenous Given 9/21/20 1044)   ciprofloxacin (CIPRO) IVPB 400 mg (0 mg Intravenous Stopped 9/21/20 1238)   bupivacaine (PF) (MARCAINE) 0.5 % injection (30 mLs Intradermal Given 9/21/20 1315)   iothalamate (CONRAY) 60 % injection (10 mLs  Given 9/21/20 1315)           The patient presenting with onset epigastric and right upper quadrant abdominal pain last night 7 PM.  Steady progression. Patient has had nausea, vomiting and diarrhea. She is received no medication here in emergency room. She states things are relatively quiet at this time. Pain however is her greatest concern. Patient has findings consistent with cholelithiasis and acute cholecystitis. Patient 65years of age and requires admission with surgical consultation. Patient does tell me that her sister and granddaughter both had gallbladders removed. Case reviewed with attending physician who recommends admission for surgical consult and consideration for cholecystectomy. At this time the patient's renal function, hepatic function and pancreatic function are normal.    General surgery has evaluated patient. The patient will be admitted for further treatment. FINAL IMPRESSION      1. Acute cholecystitis    2. Calculus of gallbladder with acute cholecystitis without obstruction          DISPOSITION/PLAN   DISPOSITION Decision To Admit 09/21/2020 11:12:41 AM      PATIENT REFERREDTO:  No follow-up provider specified. DISCHARGE MEDICATIONS:  Current Discharge Medication List          DISCONTINUED MEDICATIONS:  Current Discharge Medication List      STOP taking these medications       Respiratory Therapy Supplies (NEBULIZER/TUBING/MOUTHPIECE) KIT Comments:   Reason for Stopping:                      (Please note that portions of this note were completed with a voice recognition program.  Efforts were made to edit the dictations but occasionally words are mis-transcribed. )    Oscar Garcia PA-C (electronically signed)           Oscar Garcia PA-C  09/21/20 9720

## 2020-09-22 VITALS
HEIGHT: 59 IN | RESPIRATION RATE: 16 BRPM | OXYGEN SATURATION: 96 % | WEIGHT: 149.47 LBS | DIASTOLIC BLOOD PRESSURE: 62 MMHG | BODY MASS INDEX: 30.13 KG/M2 | SYSTOLIC BLOOD PRESSURE: 137 MMHG | TEMPERATURE: 98.1 F | HEART RATE: 104 BPM

## 2020-09-22 LAB
A/G RATIO: 1.2 (ref 1.1–2.2)
ALBUMIN SERPL-MCNC: 3.6 G/DL (ref 3.4–5)
ALP BLD-CCNC: 92 U/L (ref 40–129)
ALT SERPL-CCNC: 38 U/L (ref 10–40)
ANION GAP SERPL CALCULATED.3IONS-SCNC: 8 MMOL/L (ref 3–16)
AST SERPL-CCNC: 41 U/L (ref 15–37)
BILIRUB SERPL-MCNC: <0.2 MG/DL (ref 0–1)
BUN BLDV-MCNC: 8 MG/DL (ref 7–20)
CALCIUM SERPL-MCNC: 9 MG/DL (ref 8.3–10.6)
CHLORIDE BLD-SCNC: 112 MMOL/L (ref 99–110)
CO2: 24 MMOL/L (ref 21–32)
CREAT SERPL-MCNC: 1 MG/DL (ref 0.6–1.2)
GFR AFRICAN AMERICAN: >60
GFR NON-AFRICAN AMERICAN: 54
GLOBULIN: 2.9 G/DL
GLUCOSE BLD-MCNC: 135 MG/DL (ref 70–99)
HCT VFR BLD CALC: 36.6 % (ref 36–48)
HEMOGLOBIN: 12 G/DL (ref 12–16)
MCH RBC QN AUTO: 26.8 PG (ref 26–34)
MCHC RBC AUTO-ENTMCNC: 32.7 G/DL (ref 31–36)
MCV RBC AUTO: 81.9 FL (ref 80–100)
PDW BLD-RTO: 14.6 % (ref 12.4–15.4)
PLATELET # BLD: 196 K/UL (ref 135–450)
PMV BLD AUTO: 9.7 FL (ref 5–10.5)
POTASSIUM REFLEX MAGNESIUM: 4.2 MMOL/L (ref 3.5–5.1)
RBC # BLD: 4.47 M/UL (ref 4–5.2)
SODIUM BLD-SCNC: 144 MMOL/L (ref 136–145)
TOTAL PROTEIN: 6.5 G/DL (ref 6.4–8.2)
WBC # BLD: 8.9 K/UL (ref 4–11)

## 2020-09-22 PROCEDURE — 6370000000 HC RX 637 (ALT 250 FOR IP): Performed by: SURGERY

## 2020-09-22 PROCEDURE — 94760 N-INVAS EAR/PLS OXIMETRY 1: CPT

## 2020-09-22 PROCEDURE — 80053 COMPREHEN METABOLIC PANEL: CPT

## 2020-09-22 PROCEDURE — 36415 COLL VENOUS BLD VENIPUNCTURE: CPT

## 2020-09-22 PROCEDURE — 85027 COMPLETE CBC AUTOMATED: CPT

## 2020-09-22 PROCEDURE — 6360000002 HC RX W HCPCS: Performed by: SURGERY

## 2020-09-22 PROCEDURE — 94640 AIRWAY INHALATION TREATMENT: CPT

## 2020-09-22 PROCEDURE — APPSS15 APP SPLIT SHARED TIME 0-15 MINUTES: Performed by: NURSE PRACTITIONER

## 2020-09-22 PROCEDURE — 2580000003 HC RX 258: Performed by: SURGERY

## 2020-09-22 PROCEDURE — APPNB15 APP NON BILLABLE TIME 0-15 MINS: Performed by: NURSE PRACTITIONER

## 2020-09-22 RX ORDER — OXYCODONE HYDROCHLORIDE 5 MG/1
5 TABLET ORAL EVERY 6 HOURS PRN
Qty: 20 TABLET | Refills: 0 | Status: SHIPPED | OUTPATIENT
Start: 2020-09-22 | End: 2020-09-27

## 2020-09-22 RX ADMIN — AMLODIPINE BESYLATE 10 MG: 10 TABLET ORAL at 09:32

## 2020-09-22 RX ADMIN — TIOTROPIUM BROMIDE INHALATION SPRAY 2 PUFF: 3.12 SPRAY, METERED RESPIRATORY (INHALATION) at 08:27

## 2020-09-22 RX ADMIN — ENOXAPARIN SODIUM 40 MG: 40 INJECTION SUBCUTANEOUS at 09:32

## 2020-09-22 RX ADMIN — FUROSEMIDE 20 MG: 20 TABLET ORAL at 09:32

## 2020-09-22 RX ADMIN — BUDESONIDE AND FORMOTEROL FUMARATE DIHYDRATE 2 PUFF: 80; 4.5 AEROSOL RESPIRATORY (INHALATION) at 08:25

## 2020-09-22 RX ADMIN — SODIUM CHLORIDE: 9 INJECTION, SOLUTION INTRAVENOUS at 06:05

## 2020-09-22 ASSESSMENT — PAIN SCALES - GENERAL: PAINLEVEL_OUTOF10: 0

## 2020-09-22 NOTE — PROGRESS NOTES
Patient resting with eyes closed. Respirations within normal limits. Call light within reach.  Electronically signed by Mook Huff RN on 9/21/2020 at 8:00 PM

## 2020-09-22 NOTE — DISCHARGE SUMMARY
Physician Discharge Summary     Patient ID:  Christina Daugherty  [id number]  60 y.o.  1950    Admit date: 9/21/2020    Discharge date and time: 9/22/20 9:12 AM EDT   Admitting Physician: Clifford Mcneil MD     Discharge Physician: same    Admission Diagnoses: Acute cholecystitis [K81.0]    Discharge Diagnoses: same    Admission Condition: fair    Discharged Condition: good    Indication for Admission: abdominal pain. Symptomatic gallbladder disease. The patient has an ultrasound that does show gallstones. The patient's symptoms were felt to be secondary to gallbladder disease and they presented through the ED for removal after the risks, benefits and alternatives were discussed with them. Hospital Course:   Acute cholecystitis   -lap connie 9/21.   -POD #1 pain controlled, stable post op course  -DC home. Consults: none    Significant Diagnostic Studies:   FL CHOLANGIOGRAM OR   Final Result   Common duct is demonstrated as widely patent. US GALLBLADDER RUQ   Final Result   Cholelithiasis and pericholecystic fluid concerning for acute cholecystitis. Please correlate with clinical exam.      Diffuse hepatic steatosis. CT ABDOMEN PELVIS W IV CONTRAST Additional Contrast? None   Final Result   Distention of the gallbladder with prominent enhancement of the wall of the   gallbladder and pericholecystic fluid. Findings suggests acute cholecystitis. XR CHEST PORTABLE   Final Result   1. No acute airspace consolidation. 2. Persistent 15 mm nodular density overlying the left lower lung zone, which   appears slightly increased in size in comparison with more remote studies. Consider a repeat chest CT, preferably with contrast, to re-evaluate this   nodule. Discharge Exam:  CONSTITUTIONAL: Alert and oriented times 3, no acute distress and cooperative to examination with proper mood and affect. SKIN: Skin color, texture, turgor normal. No rashes or lesions.   LYMPH: no cervical Base) MCG/ACT inhaler Inhale 2 puffs into the lungs every 6 hours as needed for Wheezing Indications: Ventolin per PT  Qty: 1 Inhaler, Refills: 3      ipratropium-albuterol (DUONEB) 0.5-2.5 (3) MG/3ML SOLN nebulizer solution Take 3 mLs by nebulization every 4 hours as needed for Shortness of Breath  Qty: 360 mL, Refills: 1    Associated Diagnoses: Centrilobular emphysema (HCC)      Naproxen Sodium (ALEVE) 220 MG CAPS Take 1 tablet by mouth as needed for Pain         STOP taking these medications       Respiratory Therapy Supplies (NEBULIZER/TUBING/MOUTHPIECE) KIT Comments:   Reason for Stopping:             Activity: activity as tolerated, no driving while on analgesics and no heavy lifting for 6 weeks  Diet: low fat, low cholesterol diet  Wound Care: keep wound clean and dry and as directed    Follow-up with Dr. Rosa Su in 2 weeks.     SignedDivya Arreola APRN-CNP 9:14 AM 9/22/2020   [facility]  Office: [phone]   9/22/2020  9:11 AM

## 2020-09-22 NOTE — PROGRESS NOTES
Pt assessed for pain. Pnematic compression stockings in place. Pt denies any pain at this time. Will continue to monitor pt and assess for pain throughout rest of shift. Blood pressure 111/68, pulse 102, temperature 98.6 °F (37 °C), temperature source Oral, resp. rate 17, height 4' 11\" (1.499 m), weight 149 lb 7.6 oz (67.8 kg), SpO2 97 %, not currently breastfeeding.   Electronically signed by Deuce Sanz RN on 9/22/2020 at 6:12 AM

## 2020-09-22 NOTE — PLAN OF CARE
Problem: Falls - Risk of:  Goal: Will remain free from falls  Description: Will remain free from falls  9/22/2020 1446 by Ebony Murry RN  Outcome: Ongoing  Note: Will remain free from falls     Problem: Falls - Risk of:  Goal: Absence of physical injury  Description: Absence of physical injury  9/22/2020 1446 by Ebony Murry RN  Outcome: Ongoing  Note: Will remain absent of physical injury     Problem:  Activity:  Goal: Risk for activity intolerance will decrease  Description: Risk for activity intolerance will decrease  9/22/2020 1446 by Ebony Murry RN  Outcome: Ongoing  Note: Risk for activity intolerance will decrease     Problem: Sensory:  Goal: Pain level will decrease  Description: Pain level will decrease  9/22/2020 1446 by Ebony Murry RN  Outcome: Ongoing  Note: Will communicate decreased pain level

## 2020-09-22 NOTE — PROGRESS NOTES
Data- discharge order received, pt verbalized agreement to discharge, disposition to previous residence, no needs for HHC/DME. Action- discharge instructions prepared and given to pt, pt verbalized understanding. Medication information packet given r/t NEW and/or CHANGED prescriptions emphasizing name/purpose/side effects, pt verbalized understanding. Discharge instruction summary: Diet- general, Activity- ad coleen, Primary Care Physician as follows: [facility], [phone] f/u appointment within a week, prescription medications filled to preferred pharmacy. Response- Pt belongings gathered, IV removed. Disposition is home (no HHC/DME needs), transported with pt belongings, taken to lobby via w/c w/ staff, no complications.      Electronically signed by Nick Arias RN on 9/22/2020 at 3:15 PM

## 2020-09-22 NOTE — PLAN OF CARE
Problem: Falls - Risk of:  Goal: Will remain free from falls  Description: Will remain free from falls  9/22/2020 0517 by Brent Brand RN  Outcome: Ongoing  9/21/2020 1739 by Staci Oswald RN  Outcome: Ongoing  Note: Fall risk assessment completed. Fall precautions in place. Call light within reach. Pt educated on calling for assistance before getting up. Walkway free of clutter. Will continue to monitor. Electronically signed by Brent Brand RN on 9/22/2020 at 5:18 AM   Problem: Activity:  Goal: Risk for activity intolerance will decrease  Description: Risk for activity intolerance will decrease  9/22/2020 0517 by Brent Brand RN  Outcome: Ongoing  9/21/2020 1740 by Staci Oswald RN  Outcome: Ongoing     Problem: Sensory:  Goal: Pain level will decrease  Description: Pain level will decrease  9/22/2020 0517 by Brent Brand RN  Outcome: Ongoing  9/21/2020 1740 by Staci Oswald RN  Outcome: Ongoing  Note: Educated patient on pain management. Will assess patients pain level per unit protocol, and provide pain management measures as needed.    Electronically signed by Brent Brand RN on 9/22/2020 at 5:18 AM   Description: Absence of physical injury  Outcome: Ongoing

## 2020-09-22 NOTE — CARE COORDINATION
INITIAL CASE MANAGEMENT ASSESSMENT    Reviewed chart, met with patient to assess possible discharge needs. Explained Case Management role/services. Living Situation: Patient lives with her son who will assist as needed. ADLs: independent      DME: nebulizer    PT/OT Recs: not ordered     Active Services: none     Transportation: son     Medications: takes on her own    PCP: Huyen Cuevas MD    PLAN/COMMENTS: Patient plans to return home and denied needs at the present time. SW/CM provided contact information for patient or family to call with any questions. SW/CM will follow and assist as needed.     Electronically signed by Javed Hermosillo on 9/22/2020 at 3:12 PM

## 2020-09-22 NOTE — PROGRESS NOTES
Pt awake and alert this AM. Morning meds given with no issues. Asessment completed. IVF remain infusing. No needs at this time. Call light within reach. Bed in lowest position. To d/c home today. Will continue to monitor.     Electronically signed by Alexi Oliveira RN on 9/22/2020 at 11:45 AM

## 2020-09-22 NOTE — PROGRESS NOTES
Patient alert and oriented times four. Fall risk assessment completed. Fall precautions in place. Call light within reach. Pt educated on calling for assistance before getting up. Walkway free of clutter. Will continue to monitor.  Electronically signed by Jodi Gordon RN on 9/21/2020 at 8:12 PM

## 2020-10-15 ENCOUNTER — OFFICE VISIT (OUTPATIENT)
Dept: PULMONOLOGY | Age: 70
End: 2020-10-15
Payer: MEDICARE

## 2020-10-15 VITALS
DIASTOLIC BLOOD PRESSURE: 68 MMHG | OXYGEN SATURATION: 98 % | RESPIRATION RATE: 16 BRPM | SYSTOLIC BLOOD PRESSURE: 124 MMHG | TEMPERATURE: 98.3 F | BODY MASS INDEX: 29.84 KG/M2 | HEIGHT: 59 IN | WEIGHT: 148 LBS | HEART RATE: 100 BPM

## 2020-10-15 PROCEDURE — G8427 DOCREV CUR MEDS BY ELIG CLIN: HCPCS | Performed by: INTERNAL MEDICINE

## 2020-10-15 PROCEDURE — 4040F PNEUMOC VAC/ADMIN/RCVD: CPT | Performed by: INTERNAL MEDICINE

## 2020-10-15 PROCEDURE — G8926 SPIRO NO PERF OR DOC: HCPCS | Performed by: INTERNAL MEDICINE

## 2020-10-15 PROCEDURE — 1090F PRES/ABSN URINE INCON ASSESS: CPT | Performed by: INTERNAL MEDICINE

## 2020-10-15 PROCEDURE — 1036F TOBACCO NON-USER: CPT | Performed by: INTERNAL MEDICINE

## 2020-10-15 PROCEDURE — G0008 ADMIN INFLUENZA VIRUS VAC: HCPCS | Performed by: INTERNAL MEDICINE

## 2020-10-15 PROCEDURE — 1123F ACP DISCUSS/DSCN MKR DOCD: CPT | Performed by: INTERNAL MEDICINE

## 2020-10-15 PROCEDURE — 99213 OFFICE O/P EST LOW 20 MIN: CPT | Performed by: INTERNAL MEDICINE

## 2020-10-15 PROCEDURE — 90694 VACC AIIV4 NO PRSRV 0.5ML IM: CPT | Performed by: INTERNAL MEDICINE

## 2020-10-15 PROCEDURE — G8417 CALC BMI ABV UP PARAM F/U: HCPCS | Performed by: INTERNAL MEDICINE

## 2020-10-15 PROCEDURE — 3017F COLORECTAL CA SCREEN DOC REV: CPT | Performed by: INTERNAL MEDICINE

## 2020-10-15 PROCEDURE — G8399 PT W/DXA RESULTS DOCUMENT: HCPCS | Performed by: INTERNAL MEDICINE

## 2020-10-15 PROCEDURE — G8484 FLU IMMUNIZE NO ADMIN: HCPCS | Performed by: INTERNAL MEDICINE

## 2020-10-15 PROCEDURE — 3023F SPIROM DOC REV: CPT | Performed by: INTERNAL MEDICINE

## 2020-10-15 NOTE — ASSESSMENT & PLAN NOTE
Pulmonary nodule has been stable since 2015. Negative PET scan 2016. This likely granuloma. Despite the CAT scan being read as a 14 mm nodule, chest x-ray interpretation was 15 mm enlarging nodule. Chest x-ray does not have the granularity to see the difference between 14 and 15 mm nodule. Therefore, it is likely that the follow-up CT chest will not be changed. If no significant change, no further follow-up is needed for this pulmonary nodule.

## 2020-10-15 NOTE — PROGRESS NOTES
Vaccine Information Sheet, \"Influenza - Inactivated\"  given to Terra Busch, or parent/legal guardian of  Terra Busch and verbalized understanding. Patient responses:    Have you ever had a reaction to a flu vaccine? No  Do you have any current illness? No  Have you ever had Guillian Brisbin Syndrome? No  Do you have a serious allergy to any of the follow: Neomycin, Polymyxin, Thimerosal, eggs or egg products? No    Flu vaccine given per order. Please see immunization tab. Risks and benefits explained. Current VIS given.         2

## 2020-10-15 NOTE — PROGRESS NOTES
REASON FOR CONSULTATION/CC:   Chief Complaint   Patient presents with    COPD     F/U copd        Consult at request of  Conard Parker MD     PCP: Conrad Parker MD    HISTORY OF PRESENT ILLNESS: Aylin Hicks is a 65y.o. year old female with a history of tobacco abuse who presents :          COPD  Trelegy working well. Only needing albuterol with exertion. status post Gallbladder surgery. Doing well from this. PAST MEDICAL HISTORY:  Past Medical History:   Diagnosis Date    COPD (chronic obstructive pulmonary disease) ([facility] [address]     Glaucoma     Hx of blood clots     small-right leg    Hypertension        PAST SURGICAL HISTORY:  Past Surgical History:   Procedure Laterality Date    CERVIX BIOPSY N/A 1/3/2019    COLD KNIFE CONIZATION, ENDOCERVICAL CURETTAGE performed by Zoltan Waters MD at [address] [place] [address], LAPAROSCOPIC N/A 9/21/2020    CHOLECYSTECTOMY LAPAROSCOPIC, WITH CHOLANGIOGRAM performed by Brittny Tellez MD at [address] [place] [address]  09/20/2018    Dr. Christina Graham Right 7/18/2019    PHACOEMULSIFICATION WITH INTRAOCULAR LENS IMPLANT performed by Theresa Gannon MD at [place] [id number] Left 8/1/2019    PHACOEMULSIFICATION WITH INTRAOCULAR TORIC LENS IMPLANT performed by Theresa Gannon MD at [place] [address] LEEP  10/25/2018     LEEP, ecc    IL BIOPSY/EXCISION, LYMPH NODE(S) Right 10/1/2018    EXCISIONAL BRIOPSY OF RIGHT GROIN LYMPH NODE performed by Brittny Tellez MD at [facility] N/A 10/25/2018    LOOP ELECTROSURGICAL EXCISION PROCEDURE performed by Zoltan Waters MD at [facility]      Dr. Steven Muller HISTORY:  family history includes Coronary Art Dis in her mother and sister; Diabetes in her mother and sister; High Blood Pressure in her sister; Other in her father.     SOCIAL HISTORY: reports that she quit smoking about 5 years ago. Her smoking use included cigarettes. She started smoking about 54 years ago. She has a 5.50 pack-year smoking history. She has never used smokeless tobacco.      ALLERGIES:  Patient is allergic to sulfa antibiotics. REVIEW OF SYSTEMS:  Constitutional: Negative for fever   HENT: Negative for sore throat  Respiratory: Negative for dyspnea, cough  Cardiovascular: Negative for chest pain  Gastrointestinal: Negative for vomiting, diarrhea   Skin: Negative for rash  Psychiatric/Behavorial: Negative for anxiety     Objective:   PHYSICAL EXAM:  Blood pressure 124/68, pulse 100, temperature 98.3 °F (36.8 °C), temperature source Oral, resp. rate 16, height 4' 11\" (1.499 m), weight 148 lb (67.1 kg), SpO2 98 %, not currently breastfeeding.'  Gen: No distress. ENT:   Resp: No accessory muscle use. No crackles. No wheezes. No rhonchi. CV: Regular rate. Regular rhythm. No murmur or rub. No edema. Skin: Warm, dry, normal texture and turgor. No nodule on exposed extremities. M/S: No cyanosis. No clubbing. No joint deformity. Psych: Oriented x 3. No anxiety. Awake. Alert. Intact judgement and insight. Good Mood / Affect.   Memory appears in tact     Current Outpatient Medications   Medication Sig Dispense Refill    furosemide (LASIX) 20 MG tablet TAKE 1 TABLET BY MOUTH EVERY DAY 90 tablet 1    amLODIPine (NORVASC) 10 MG tablet TAKE 1 TABLET BY MOUTH EVERY DAY 90 tablet 1    fluticasone-umeclidin-vilant (TRELEGY ELLIPTA) 100-62.5-25 MCG/INH AEPB Inhale 1 puff into the lungs daily 1 each 2    albuterol sulfate HFA (VENTOLIN HFA) 108 (90 Base) MCG/ACT inhaler Inhale 2 puffs into the lungs every 6 hours as needed for Wheezing Indications: Ventolin per PT 1 Inhaler 3    ipratropium-albuterol (DUONEB) 0.5-2.5 (3) MG/3ML SOLN nebulizer solution Take 3 mLs by nebulization every 4 hours as needed for Shortness of Breath 360 mL 1    Naproxen Sodium (ALEVE) 220 MG CAPS Take 1 tablet by mouth as needed for Pain       No current facility-administered medications for this visit. Data Reviewed:   CBC and Renal reviewed  Last CBC  Lab Results   Component Value Date    WBC 8.9 09/22/2020    RBC 4.47 09/22/2020    HGB 12.0 09/22/2020    MCV 81.9 09/22/2020     09/22/2020     Last Renal  Lab Results   Component Value Date     09/22/2020    K 4.2 09/22/2020     09/22/2020    CO2 24 09/22/2020    CO2 23 09/21/2020    CO2 25 08/04/2020    BUN 8 09/22/2020    CREATININE 1.0 09/22/2020    GLUCOSE 135 09/22/2020    CALCIUM 9.0 09/22/2020       Last ABG  POC Blood Gas: No results found for: POCPH, POCPCO2, POCPO2, POCHCO3, NBEA, [id number]  No results for input(s): PH, PCO2, PO2, HCO3, BE, O2SAT in the last 72 hours. Assessment:     ·  Pulmonary Nodule, stable from 2015 to 2018   · COPD    Plan:        Problem List Items Addressed This Visit     Pulmonary nodule, left- on lung cancer screening program-  pet scan 6/16=unchanged billy nodule-little activity lung--pos activoity ge jct (still need to cont puln nod surv x 2 yrs)     Pulmonary nodule has been stable since 2015. Negative PET scan 2016. This likely granuloma. Despite the CAT scan being read as a 14 mm nodule, chest x-ray interpretation was 15 mm enlarging nodule. Chest x-ray does not have the granularity to see the difference between 14 and 15 mm nodule. Therefore, it is likely that the follow-up CT chest will not be changed. If no significant change, no further follow-up is needed for this pulmonary nodule. COPD (chronic obstructive pulmonary disease)--pfts show severe obst defect w severe decrease dlco  +bronchodilator response 6/14--repeat unchanged - Primary     Continues to be stable with Trelegy and albuterol as needed. No change in therapy.            Other Visit Diagnoses     Need for influenza vaccination        Relevant Orders    INFLUENZA, QUADV, ADJUVANTED, 72 YRS =, IM, PF, PREFILL SYR, 0.5ML (FLUAD) (Completed)    Pulmonary nodule seen on imaging study        Relevant Orders    Low Dose Chest CT--pulmonologist/radiologist use only        This note was transcribed using [employer]. Please disregard any translational errors.

## 2020-10-15 NOTE — LETTER
[facility]   [address] [facility] [facility]  Phone: [phone]  Fax: [phone]     10/15/2020    Dr. Ratna Guillen MD    I have seen your patient, J Carlos Guevara on follow up. Here is the assessment and plan for your patient. Any question, please do not hesitate to call. Problem List Items Addressed This Visit     Pulmonary nodule, left- on lung cancer screening program-  pet scan 6/16=unchanged billy nodule-little activity lung--pos activoity ge jct (still need to cont puln nod surv x 2 yrs)     Pulmonary nodule has been stable since 2015. Negative PET scan 2016. This likely granuloma. Despite the CAT scan being read as a 14 mm nodule, chest x-ray interpretation was 15 mm enlarging nodule. Chest x-ray does not have the granularity to see the difference between 14 and 15 mm nodule. Therefore, it is likely that the follow-up CT chest will not be changed. If no significant change, no further follow-up is needed for this pulmonary nodule. COPD (chronic obstructive pulmonary disease)--pfts show severe obst defect w severe decrease dlco  +bronchodilator response 6/14--repeat unchanged - Primary     Continues to be stable with Trelegy and albuterol as needed. No change in therapy.            Other Visit Diagnoses     Need for influenza vaccination        Relevant Orders    INFLUENZA, QUADV, ADJUVANTED, 72 YRS =, IM, PF, PREFILL SYR, 0.5ML (FLUAD) (Completed)    Pulmonary nodule seen on imaging study        Relevant Orders    Low Dose Chest CT--pulmonologist/radiologist use only            Sincerely,    [facility]  [phone]

## 2020-12-18 RX ORDER — AMLODIPINE BESYLATE 10 MG/1
TABLET ORAL
Qty: 90 TABLET | Refills: 0 | Status: SHIPPED | OUTPATIENT
Start: 2020-12-18 | End: 2021-02-03 | Stop reason: SDUPTHER

## 2020-12-18 RX ORDER — FUROSEMIDE 20 MG/1
TABLET ORAL
Qty: 90 TABLET | Refills: 0 | Status: SHIPPED | OUTPATIENT
Start: 2020-12-18 | End: 2021-09-08 | Stop reason: SDUPTHER

## 2020-12-21 ENCOUNTER — OFFICE VISIT (OUTPATIENT)
Dept: GYNECOLOGY | Age: 70
End: 2020-12-21
Payer: MEDICARE

## 2020-12-21 VITALS
DIASTOLIC BLOOD PRESSURE: 84 MMHG | BODY MASS INDEX: 28.48 KG/M2 | SYSTOLIC BLOOD PRESSURE: 144 MMHG | WEIGHT: 141 LBS | HEART RATE: 97 BPM | TEMPERATURE: 96.5 F

## 2020-12-21 PROCEDURE — G8417 CALC BMI ABV UP PARAM F/U: HCPCS | Performed by: OBSTETRICS & GYNECOLOGY

## 2020-12-21 PROCEDURE — G8427 DOCREV CUR MEDS BY ELIG CLIN: HCPCS | Performed by: OBSTETRICS & GYNECOLOGY

## 2020-12-21 PROCEDURE — G0101 CA SCREEN;PELVIC/BREAST EXAM: HCPCS | Performed by: OBSTETRICS & GYNECOLOGY

## 2020-12-21 NOTE — PROGRESS NOTES
Subjective:      Patient ID: Solomon Kiran is a 65 y.o. female. HPI  pts here for annual gyn exam.  She denies complaints. No bleeding. Due for mammogram.  Up to date regarding colonoscopy. Review of Systems Pertinent review of systems items discussed above. All others systems items not discussed above were negative. Objective:   Physical Exam  Constitutional:       Appearance: She is well-developed. HENT:      Head: Normocephalic and atraumatic. Neck:      Thyroid: No thyromegaly. Trachea: No tracheal deviation. Cardiovascular:      Rate and Rhythm: Normal rate and regular rhythm. Heart sounds: Normal heart sounds. No murmur. Pulmonary:      Effort: Pulmonary effort is normal. No respiratory distress. Breath sounds: Normal breath sounds. No wheezing or rales. Chest:      Breasts:         Right: No mass, nipple discharge or skin change. Left: No mass, nipple discharge or skin change. Abdominal:      General: There is no distension. Palpations: Abdomen is soft. There is no mass. Tenderness: There is no abdominal tenderness. There is no rebound. Genitourinary:     Labia:         Right: No lesion. Left: No lesion. Vagina: Normal. No foreign body. No vaginal discharge. Cervix: No cervical motion tenderness, discharge or friability. Uterus: Not deviated, not enlarged, not fixed and not tender. Adnexa:         Right: No mass or tenderness. Left: No mass or tenderness. Rectum: Normal. Guaiac result negative. No mass or external hemorrhoid. Comments: Pap performed. Musculoskeletal: Normal range of motion. Lymphadenopathy:      Cervical: No cervical adenopathy. Neurological:      Mental Status: She is alert and oriented to person, place, and time. Assessment:   Normal gyn exam     Plan:   Mammogram.  Call with results.   F/u annual gyn exam.       Simona Lee MD

## 2020-12-28 ENCOUNTER — HOSPITAL ENCOUNTER (OUTPATIENT)
Dept: CT IMAGING | Age: 70
Discharge: HOME | End: 2020-12-28
Payer: MEDICARE

## 2020-12-28 PROCEDURE — 71250 CT THORAX DX C-: CPT

## 2021-01-20 ENCOUNTER — OFFICE VISIT (OUTPATIENT)
Dept: FAMILY MEDICINE CLINIC | Age: 71
End: 2021-01-20
Payer: COMMERCIAL

## 2021-01-20 VITALS
OXYGEN SATURATION: 96 % | DIASTOLIC BLOOD PRESSURE: 84 MMHG | HEIGHT: 59 IN | SYSTOLIC BLOOD PRESSURE: 140 MMHG | TEMPERATURE: 97.5 F | RESPIRATION RATE: 14 BRPM | WEIGHT: 142.8 LBS | BODY MASS INDEX: 28.79 KG/M2 | HEART RATE: 82 BPM

## 2021-01-20 DIAGNOSIS — I50.9 ACUTE CONGESTIVE HEART FAILURE, UNSPECIFIED HEART FAILURE TYPE (HCC): ICD-10-CM

## 2021-01-20 DIAGNOSIS — I25.84 CORONARY ARTERY CALCIFICATION: Primary | ICD-10-CM

## 2021-01-20 DIAGNOSIS — J43.2 CENTRILOBULAR EMPHYSEMA (HCC): ICD-10-CM

## 2021-01-20 DIAGNOSIS — R73.03 PREDIABETES: ICD-10-CM

## 2021-01-20 DIAGNOSIS — I82.401 ACUTE EMBOLISM AND THROMBOSIS OF DEEP VEIN OF RIGHT LOWER EXTREMITY (HCC): ICD-10-CM

## 2021-01-20 DIAGNOSIS — I10 ESSENTIAL HYPERTENSION: ICD-10-CM

## 2021-01-20 DIAGNOSIS — I25.10 CORONARY ARTERY CALCIFICATION: Primary | ICD-10-CM

## 2021-01-20 PROBLEM — K81.0 ACUTE CHOLECYSTITIS: Status: RESOLVED | Noted: 2020-09-21 | Resolved: 2021-01-20

## 2021-01-20 LAB — HBA1C MFR BLD: 5.4 %

## 2021-01-20 PROCEDURE — 83036 HEMOGLOBIN GLYCOSYLATED A1C: CPT | Performed by: INTERNAL MEDICINE

## 2021-01-20 PROCEDURE — 99213 OFFICE O/P EST LOW 20 MIN: CPT | Performed by: INTERNAL MEDICINE

## 2021-01-20 PROCEDURE — 93000 ELECTROCARDIOGRAM COMPLETE: CPT | Performed by: INTERNAL MEDICINE

## 2021-01-20 RX ORDER — PRAVASTATIN SODIUM 20 MG
20 TABLET ORAL DAILY
Qty: 90 TABLET | Refills: 1 | Status: SHIPPED | OUTPATIENT
Start: 2021-01-20 | End: 2021-09-08 | Stop reason: SDUPTHER

## 2021-01-20 ASSESSMENT — ENCOUNTER SYMPTOMS
WHEEZING: 0
SHORTNESS OF BREATH: 0
VOMITING: 0
NAUSEA: 0
COUGH: 0

## 2021-01-20 ASSESSMENT — PATIENT HEALTH QUESTIONNAIRE - PHQ9
SUM OF ALL RESPONSES TO PHQ QUESTIONS 1-9: 0
SUM OF ALL RESPONSES TO PHQ9 QUESTIONS 1 & 2: 0
SUM OF ALL RESPONSES TO PHQ QUESTIONS 1-9: 0
SUM OF ALL RESPONSES TO PHQ QUESTIONS 1-9: 0

## 2021-01-20 NOTE — PATIENT INSTRUCTIONS
Starting cholesterol medication for coronary artery calcification seen on the CT that the pulmonary doctor ordered. Prediabetes now shows a lower hga1c    great work  Lab Results   Component Value Date    LABA1C 5.4 01/20/2021    LABA1C 5.8 08/04/2020    LABA1C 5.0 03/12/2018         For covid vaccine : To pre-register for COVID vaccine: [email or web address]   More info at: Jennifer

## 2021-01-20 NOTE — PROGRESS NOTES
HDL 66 (H) 2019    HDL 85 (H) 2017     Lab Results   Component Value Date    LDLCALC 86 2020    LDLCALC 100 (H) 2019    LDLCALC 66 2017     Lab Results   Component Value Date    LABVLDL 15 2020    LABVLDL 23 2019    LABVLDL 15 2017     No results found for: CHOLHDLRATIO     19 stress exercise test   Summary   Normal GXT,Negative for ischemia. Markedly reduced excercise capacity   Abnormal systolic hypertensive respnose to excercise   Patient complained of fatigue and requested stop after obtaining target   heart. Review of Systems   Constitutional: Negative for chills and fever. Respiratory: Negative for cough, shortness of breath and wheezing. Cardiovascular: Positive for palpitations (sometimes in the summer noticed palpitation). Negative for chest pain. Gastrointestinal: Negative for nausea and vomiting. Neurological: Negative for dizziness and light-headedness.        Health Maintenance   Topic Date Due    Shingles Vaccine (2 of 3) 2016    DTaP/Tdap/Td vaccine (2 - Td) 2019    Annual Wellness Visit (AWV)  2019    Lipid screen  2021    Potassium monitoring  2021    Creatinine monitoring  2021    Breast cancer screen  2021    A1C test (Diabetic or Prediabetic)  2022    Colon cancer screen colonoscopy  2028    DEXA (modify frequency per FRAX score)  Completed    Flu vaccine  Completed    Pneumococcal 65+ years Vaccine  Completed    Hepatitis C screen  Completed    Hepatitis A vaccine  Aged Out    Hepatitis B vaccine  Aged Out    Hib vaccine  Aged Out    Meningococcal (ACWY) vaccine  Aged Out      Social History     Tobacco Use    Smoking status: Former Smoker     Packs/day: 0.50     Years: 11.00     Pack years: 5.50     Types: Cigarettes     Start date: 1966     Quit date: 8/3/2015     Years since quittin.4    Smokeless tobacco: Never Used  Tobacco comment: counseled on tobacco exposure avoidance   Substance Use Topics    Alcohol use: No     Alcohol/week: 0.0 standard drinks    Drug use: No      Family History   Problem Relation Age of Onset    Other Father         Tuberculosis in his 34\"s in [address] Diabetes Mother     Coronary Art Dis Mother     Diabetes Sister     High Blood Pressure Sister     Coronary Art Dis Sister      Prior to Visit Medications    Medication Sig Taking?  Authorizing Provider   pravastatin (PRAVACHOL) 20 MG tablet Take 1 tablet by mouth daily Yes Tay Neri MD   amLODIPine (NORVASC) 10 MG tablet TAKE 1 TABLET BY MOUTH EVERY DAY Yes Tay Neri MD   furosemide (LASIX) 20 MG tablet TAKE 1 TABLET BY MOUTH EVERY DAY Yes Tay Neri MD   fluticasone-umeclidin-vilant (TRELEGY ELLIPTA) 100-62.5-25 MCG/INH AEPB Inhale 1 puff into the lungs daily Yes Tay Neri MD   albuterol sulfate HFA (VENTOLIN HFA) 108 (90 Base) MCG/ACT inhaler Inhale 2 puffs into the lungs every 6 hours as needed for Wheezing Indications: Ventolin per PT Yes Tay Neri MD   ipratropium-albuterol (DUONEB) 0.5-2.5 (3) MG/3ML SOLN nebulizer solution Take 3 mLs by nebulization every 4 hours as needed for Shortness of Breath Yes Tay Neri MD   Naproxen Sodium (ALEVE) 220 MG CAPS Take 1 tablet by mouth as needed for Pain Yes Historical Provider, MD     Patient Active Problem List   Diagnosis    COPD (chronic obstructive pulmonary disease)--pfts show severe obst defect w severe decrease dlco  +bronchodilator response 6/14--repeat unchanged    Tobacco abuse-pt quit 7/15--cold turkey    Pulmonary nodule, left- on lung cancer screening program-  pet scan 6/16=unchanged billy nodule-little activity lung--pos activoity ge jct (still need to cont puln nod surv x 2 yrs)    Dependent edema--echo 10/15--nl ef--mild diast dysf--advised low salt diet/ stockings/ wt loss    Essential hypertension Appearance: Normal appearance. She is well-developed. HENT:      Head: Normocephalic and atraumatic. Cardiovascular:      Rate and Rhythm: Normal rate and regular rhythm. Pulses: Normal pulses. Heart sounds: No murmur. Pulmonary:      Effort: Pulmonary effort is normal.      Breath sounds: Normal breath sounds. No wheezing. Abdominal:      Palpations: Abdomen is soft. Tenderness: There is no abdominal tenderness. Skin:     General: Skin is warm and dry. Comments: No open lesions. Chronic discoloration in both tibial areas  No edema     Neurological:      Mental Status: She is alert. Psychiatric:         Mood and Affect: Mood normal.         Behavior: Behavior normal.         Thought Content: Thought content normal.         Judgment: Judgment normal.       BP Readings from Last 5 Encounters:   01/20/21 (!) 140/84   12/21/20 (!) 144/84   10/15/20 124/68   09/22/20 137/62   09/21/20 138/67       Wt Readings from Last 5 Encounters:   01/20/21 142 lb 12.8 oz (64.8 kg)   12/21/20 141 lb (64 kg)   10/15/20 148 lb (67.1 kg)   09/22/20 149 lb 7.6 oz (67.8 kg)   08/11/20 151 lb (68.5 kg)      Tyler Tate was seen today for follow-up. Diagnoses and all orders for this visit:    Coronary artery calcification  -     EKG 12 Lead  ekg nsr rate  67 rare pac, slight t inversion v2   Not new    Prediabetes  -     POCT glycosylated hemoglobin (Hb A1C)    Centrilobular emphysema (HCC)  Seeing pulmonary     Essential hypertension  bp little high today    Other orders  -     pravastatin (PRAVACHOL) 20 MG tablet; Take 1 tablet by mouth daily  Start statin for the coronary artery calcifications      Acute congestive heart failure, unspecified heart failure type ([address]  She is not in acute heart failure and this problem needs resolved. I don't see it on the problem list    Acute embolism and thrombosis of deep vein of right lower extremity ([address]  I don't think this is accurate. I reviewed ultrasound 2018 - no dvt present  Also needs resolved.     Fu in  2 months

## 2021-01-25 ENCOUNTER — HOSPITAL ENCOUNTER (OUTPATIENT)
Dept: WOMENS IMAGING | Age: 71
Discharge: HOME | End: 2021-01-25
Payer: MEDICARE

## 2021-01-25 DIAGNOSIS — Z12.31 ENCOUNTER FOR SCREENING MAMMOGRAM FOR MALIGNANT NEOPLASM OF BREAST: ICD-10-CM

## 2021-01-25 PROCEDURE — 77063 BREAST TOMOSYNTHESIS BI: CPT

## 2021-02-02 DIAGNOSIS — I25.84 CORONARY ARTERY CALCIFICATION: ICD-10-CM

## 2021-02-02 DIAGNOSIS — I25.10 CORONARY ARTERY CALCIFICATION: ICD-10-CM

## 2021-02-02 LAB
A/G RATIO: 1.4 (ref 1.1–2.2)
ALBUMIN SERPL-MCNC: 4.3 G/DL (ref 3.4–5)
ALP BLD-CCNC: 114 U/L (ref 40–129)
ALT SERPL-CCNC: 14 U/L (ref 10–40)
ANION GAP SERPL CALCULATED.3IONS-SCNC: 15 MMOL/L (ref 3–16)
AST SERPL-CCNC: 17 U/L (ref 15–37)
BILIRUB SERPL-MCNC: 0.4 MG/DL (ref 0–1)
BUN BLDV-MCNC: 9 MG/DL (ref 7–20)
CALCIUM SERPL-MCNC: 9.9 MG/DL (ref 8.3–10.6)
CHLORIDE BLD-SCNC: 101 MMOL/L (ref 99–110)
CHOLESTEROL, TOTAL: 163 MG/DL (ref 0–199)
CO2: 27 MMOL/L (ref 21–32)
CREAT SERPL-MCNC: 0.9 MG/DL (ref 0.6–1.2)
GFR AFRICAN AMERICAN: >60
GFR NON-AFRICAN AMERICAN: >60
GLOBULIN: 3 G/DL
GLUCOSE BLD-MCNC: 94 MG/DL (ref 70–99)
HDLC SERPL-MCNC: 74 MG/DL (ref 40–60)
LDL CHOLESTEROL CALCULATED: 70 MG/DL
POTASSIUM SERPL-SCNC: 5.3 MMOL/L (ref 3.5–5.1)
SODIUM BLD-SCNC: 143 MMOL/L (ref 136–145)
TOTAL PROTEIN: 7.3 G/DL (ref 6.4–8.2)
TRIGL SERPL-MCNC: 96 MG/DL (ref 0–150)
VLDLC SERPL CALC-MCNC: 19 MG/DL

## 2021-02-03 ENCOUNTER — NURSE ONLY (OUTPATIENT)
Dept: FAMILY MEDICINE CLINIC | Age: 71
End: 2021-02-03

## 2021-02-03 VITALS — DIASTOLIC BLOOD PRESSURE: 86 MMHG | SYSTOLIC BLOOD PRESSURE: 178 MMHG

## 2021-02-03 PROCEDURE — 99999 PR OFFICE/OUTPT VISIT,PROCEDURE ONLY: CPT | Performed by: INTERNAL MEDICINE

## 2021-02-03 RX ORDER — ALBUTEROL SULFATE 90 UG/1
2 AEROSOL, METERED RESPIRATORY (INHALATION) EVERY 6 HOURS PRN
Qty: 1 INHALER | Refills: 1 | Status: SHIPPED | OUTPATIENT
Start: 2021-02-03 | End: 2021-09-08 | Stop reason: SDUPTHER

## 2021-02-03 RX ORDER — AMLODIPINE BESYLATE 10 MG/1
TABLET ORAL
Qty: 90 TABLET | Refills: 0 | Status: SHIPPED | OUTPATIENT
Start: 2021-02-03 | End: 2021-04-28

## 2021-02-03 NOTE — PROGRESS NOTES
Patient is here for a B/P check. Patient has been out of her medication for a week. Her B/P was checked twice 15 15 minutes apart and is elevated. Per Dr. Mary Melendez patient needs to take medication and come back in 1 week for re-check.  Refill sent to [facility]

## 2021-02-04 DIAGNOSIS — E87.5 HYPERKALEMIA: Primary | ICD-10-CM

## 2021-02-10 ENCOUNTER — NURSE ONLY (OUTPATIENT)
Dept: FAMILY MEDICINE CLINIC | Age: 71
End: 2021-02-10

## 2021-02-10 VITALS — SYSTOLIC BLOOD PRESSURE: 140 MMHG | DIASTOLIC BLOOD PRESSURE: 80 MMHG

## 2021-02-10 DIAGNOSIS — E87.5 HYPERKALEMIA: ICD-10-CM

## 2021-02-10 LAB — POTASSIUM SERPL-SCNC: 4.8 MMOL/L (ref 3.5–5.1)

## 2021-04-19 ENCOUNTER — OFFICE VISIT (OUTPATIENT)
Dept: PULMONOLOGY | Age: 71
End: 2021-04-19
Payer: MEDICARE

## 2021-04-19 VITALS
OXYGEN SATURATION: 94 % | BODY MASS INDEX: 28.83 KG/M2 | SYSTOLIC BLOOD PRESSURE: 118 MMHG | TEMPERATURE: 96.9 F | RESPIRATION RATE: 16 BRPM | DIASTOLIC BLOOD PRESSURE: 74 MMHG | WEIGHT: 143 LBS | HEART RATE: 107 BPM | HEIGHT: 59 IN

## 2021-04-19 DIAGNOSIS — R91.1 PULMONARY NODULE, LEFT: ICD-10-CM

## 2021-04-19 DIAGNOSIS — J43.2 CENTRILOBULAR EMPHYSEMA (HCC): ICD-10-CM

## 2021-04-19 PROCEDURE — 99214 OFFICE O/P EST MOD 30 MIN: CPT | Performed by: INTERNAL MEDICINE

## 2021-04-19 PROCEDURE — 1123F ACP DISCUSS/DSCN MKR DOCD: CPT | Performed by: INTERNAL MEDICINE

## 2021-04-19 PROCEDURE — G8417 CALC BMI ABV UP PARAM F/U: HCPCS | Performed by: INTERNAL MEDICINE

## 2021-04-19 PROCEDURE — 3023F SPIROM DOC REV: CPT | Performed by: INTERNAL MEDICINE

## 2021-04-19 PROCEDURE — 1036F TOBACCO NON-USER: CPT | Performed by: INTERNAL MEDICINE

## 2021-04-19 PROCEDURE — G8399 PT W/DXA RESULTS DOCUMENT: HCPCS | Performed by: INTERNAL MEDICINE

## 2021-04-19 PROCEDURE — G8926 SPIRO NO PERF OR DOC: HCPCS | Performed by: INTERNAL MEDICINE

## 2021-04-19 PROCEDURE — G8427 DOCREV CUR MEDS BY ELIG CLIN: HCPCS | Performed by: INTERNAL MEDICINE

## 2021-04-19 PROCEDURE — 3017F COLORECTAL CA SCREEN DOC REV: CPT | Performed by: INTERNAL MEDICINE

## 2021-04-19 PROCEDURE — 1090F PRES/ABSN URINE INCON ASSESS: CPT | Performed by: INTERNAL MEDICINE

## 2021-04-19 PROCEDURE — 4040F PNEUMOC VAC/ADMIN/RCVD: CPT | Performed by: INTERNAL MEDICINE

## 2021-04-19 ASSESSMENT — COPD QUESTIONNAIRES
QUESTION4_WALKINCLINE: 2
QUESTION2_CHESTPHLEGM: 0
QUESTION1_COUGHFREQUENCY: 1
QUESTION6_LEAVINGHOUSE: 0

## 2021-04-19 NOTE — PROGRESS NOTES
x 2 yrs)     follow up CT chest ordered. She is working on completing this. It is unlikely to change. Centrilobular emphysema ([address]     Very well controlled with Trelegy and albuterol. CAT score 5             This note was transcribed using [id number]. Please disregard any translational errors.

## 2021-04-20 ENCOUNTER — HOSPITAL ENCOUNTER (OUTPATIENT)
Dept: CT IMAGING | Age: 71
Discharge: HOME | End: 2021-04-20
Payer: MEDICARE

## 2021-04-20 DIAGNOSIS — R91.1 PULMONARY NODULE SEEN ON IMAGING STUDY: ICD-10-CM

## 2021-04-20 PROCEDURE — 71250 CT THORAX DX C-: CPT

## 2021-04-21 ENCOUNTER — OFFICE VISIT (OUTPATIENT)
Dept: FAMILY MEDICINE CLINIC | Age: 71
End: 2021-04-21
Payer: MEDICARE

## 2021-04-21 VITALS
DIASTOLIC BLOOD PRESSURE: 74 MMHG | HEIGHT: 59 IN | OXYGEN SATURATION: 96 % | BODY MASS INDEX: 28.87 KG/M2 | RESPIRATION RATE: 14 BRPM | SYSTOLIC BLOOD PRESSURE: 126 MMHG | WEIGHT: 143.2 LBS | HEART RATE: 108 BPM

## 2021-04-21 DIAGNOSIS — R53.81 PHYSICAL DECONDITIONING: ICD-10-CM

## 2021-04-21 DIAGNOSIS — R00.0 TACHYCARDIA: ICD-10-CM

## 2021-04-21 DIAGNOSIS — I10 ESSENTIAL HYPERTENSION: Primary | ICD-10-CM

## 2021-04-21 DIAGNOSIS — R73.03 PREDIABETES: ICD-10-CM

## 2021-04-21 DIAGNOSIS — I25.84 CORONARY ARTERY CALCIFICATION: ICD-10-CM

## 2021-04-21 DIAGNOSIS — I25.10 CORONARY ARTERY CALCIFICATION: ICD-10-CM

## 2021-04-21 DIAGNOSIS — L40.1 PUSTULAR PSORIASIS: ICD-10-CM

## 2021-04-21 LAB
A/G RATIO: 1.8 (ref 1.1–2.2)
ALBUMIN SERPL-MCNC: 4.7 G/DL (ref 3.4–5)
ALP BLD-CCNC: 115 U/L (ref 40–129)
ALT SERPL-CCNC: 19 U/L (ref 10–40)
ANION GAP SERPL CALCULATED.3IONS-SCNC: 14 MMOL/L (ref 3–16)
AST SERPL-CCNC: 21 U/L (ref 15–37)
BILIRUB SERPL-MCNC: 0.3 MG/DL (ref 0–1)
BUN BLDV-MCNC: 9 MG/DL (ref 7–20)
CALCIUM SERPL-MCNC: 9.3 MG/DL (ref 8.3–10.6)
CHLORIDE BLD-SCNC: 104 MMOL/L (ref 99–110)
CO2: 24 MMOL/L (ref 21–32)
CREAT SERPL-MCNC: 1.1 MG/DL (ref 0.6–1.2)
GFR AFRICAN AMERICAN: 59
GFR NON-AFRICAN AMERICAN: 49
GLOBULIN: 2.6 G/DL
GLUCOSE BLD-MCNC: 98 MG/DL (ref 70–99)
POTASSIUM SERPL-SCNC: 4.4 MMOL/L (ref 3.5–5.1)
SODIUM BLD-SCNC: 142 MMOL/L (ref 136–145)
TOTAL PROTEIN: 7.3 G/DL (ref 6.4–8.2)

## 2021-04-21 PROCEDURE — 99214 OFFICE O/P EST MOD 30 MIN: CPT | Performed by: INTERNAL MEDICINE

## 2021-04-21 PROCEDURE — 93000 ELECTROCARDIOGRAM COMPLETE: CPT | Performed by: INTERNAL MEDICINE

## 2021-04-21 ASSESSMENT — ENCOUNTER SYMPTOMS
SHORTNESS OF BREATH: 0
COUGH: 0

## 2021-04-21 NOTE — PROGRESS NOTES
4/21/2021    Chief Complaint   Patient presents with    Follow-up     ccfu       HPI  Doing well  Here for fu  Saw pulmonary 2 days ago. Had a test yesterday    Prediabetes     Lab Results   Component Value Date    LABA1C 5.4 01/20/2021    LABA1C 5.8 08/04/2020    LABA1C 5.0 03/12/2018     htn  bp have been good  No headache or dizziness  On amlodipine  10. Says when she take the lasix it helps the swelling'  Only has swelling in the right leg    Only takes aleve with a headache  \  Psoriasis   Not seeing derm anymore  Rash looks better  She does not want to go back. Coronary artery calcification  On pravastatin for   7/16/19 stress test    Summary   Normal GXT,Negative for ischemia. Markedly reduced excercise capacity   Abnormal systolic hypertensive respnose to excercise   Patient complained of fatigue and requested stop after obtaining target   heart. Review of Systems   Respiratory: Negative for cough and shortness of breath. Cardiovascular: Negative for chest pain and palpitations.    Skin:        Rash better  Started using vasoline with jony butter and aloe       Health Maintenance   Topic Date Due    COVID-19 Vaccine (1) Never done    Shingles Vaccine (2 of 3) 07/20/2016    DTaP/Tdap/Td vaccine (2 - Td) 01/13/2019    Annual Wellness Visit (AWV)  Never done    A1C test (Diabetic or Prediabetic)  01/20/2022    Lipid screen  02/02/2022    Creatinine monitoring  02/02/2022    Potassium monitoring  02/10/2022    Breast cancer screen  01/25/2023    Colon cancer screen colonoscopy  09/25/2028    DEXA (modify frequency per FRAX score)  Completed    Flu vaccine  Completed    Pneumococcal 65+ years Vaccine  Completed    Hepatitis C screen  Completed    Hepatitis A vaccine  Aged Out    Hepatitis B vaccine  Aged Out    Hib vaccine  Aged Out    Meningococcal (ACWY) vaccine  Aged Out      Social History     Tobacco Use    Smoking status: Former Smoker     Packs/day: 0.50     Years: 11.00     Pack years: 5.50     Types: Cigarettes     Start date: 1966     Quit date: 8/3/2015     Years since quittin.7    Smokeless tobacco: Never Used    Tobacco comment: counseled on tobacco exposure avoidance   Substance Use Topics    Alcohol use: No     Alcohol/week: 0.0 standard drinks    Drug use: No      Family History   Problem Relation Age of Onset    Other Father         Tuberculosis in his 34\"s in [address] Diabetes Mother     Coronary Art Dis Mother     Diabetes Sister     High Blood Pressure Sister     Coronary Art Dis Sister      Prior to Visit Medications    Medication Sig Taking?  Authorizing Provider   amLODIPine (NORVASC) 10 MG tablet TAKE 1 TABLET BY MOUTH EVERY DAY Yes Maximus Weston MD   albuterol sulfate HFA (VENTOLIN HFA) 108 (90 Base) MCG/ACT inhaler Inhale 2 puffs into the lungs every 6 hours as needed for Wheezing Indications: Ventolin per PT Yes Maximus Weston MD   pravastatin (PRAVACHOL) 20 MG tablet Take 1 tablet by mouth daily Yes Maximus Weston MD   furosemide (LASIX) 20 MG tablet TAKE 1 TABLET BY MOUTH EVERY DAY Yes Maximus Weston MD   fluticasone-umeclidin-vilant (TRELEGY ELLIPTA) 100-62.5-25 MCG/INH AEPB Inhale 1 puff into the lungs daily Yes Maximus Weston MD   ipratropium-albuterol (DUONEB) 0.5-2.5 (3) MG/3ML SOLN nebulizer solution Take 3 mLs by nebulization every 4 hours as needed for Shortness of Breath Yes Maximus Weston MD   Naproxen Sodium (ALEVE) 220 MG CAPS Take 1 tablet by mouth as needed for Pain Yes Historical Provider, MD     Patient Active Problem List   Diagnosis    COPD (chronic obstructive pulmonary disease)--pfts show severe obst defect w severe decrease dlco  +bronchodilator response --repeat unchanged    Tobacco abuse-pt quit 7/15--cold turkey    Pulmonary nodule, left- on lung cancer screening program-  pet scan =unchanged billy nodule-little activity lung--pos activoity ge jct (still need to cont puln nod surv x 2 yrs)    Dependent edema--echo 10/15--nl ef--mild diast dysf--advised low salt diet/ stockings/ wt loss    Essential hypertension    Diastolic dysfunction--mild on echo 10/15--nl ef    Pustular psoriasis, Dr. Damari Dodson Cent[facility]ular emphyse[facility])    Mediastinal adenopathy    SIRS (systemic inflammatory response syndrome) (HCC)    Leukocytosis    Ex-smoker    Tobacco abuse counseling    Elevated brain natriuretic peptide (BNP) level    Enlarged lymph node enlarged ing ly node 2018    Inguinal adenopathy work up  2018 Dr. Leda Marie.  neg path     Edema of right lower extremity aug/sept  2018    Coronary artery calcification    S/P colonoscopy 9/18 [place] - 8mm polyp    HGSIL (high grade squamous intraepithelial lesion) on Pap smear of cervix    Prediabetes        LABS:   Lab Results   Component Value Date    GLUCOSE 94 02/02/2021     Lab Results   Component Value Date     02/02/2021    K 4.8 02/10/2021    CREATININE 0.9 02/02/2021     Cholesterol, Total   Date Value Ref Range Status   02/02/2021 163 0 - 199 mg/dL Final     LDL Calculated   Date Value Ref Range Status   02/02/2021 70 <100 mg/dL Final     HDL   Date Value Ref Range Status   02/02/2021 74 (H) 40 - 60 mg/dL Final     Triglycerides   Date Value Ref Range Status   02/02/2021 96 0 - 150 mg/dL Final     Lab Results   Component Value Date    ALT 14 02/02/2021    AST 17 02/02/2021    ALKPHOS 114 02/02/2021    BILITOT 0.4 02/02/2021      Lab Results   Component Value Date    WBC 8.9 09/22/2020    HGB 12.0 09/22/2020    HCT 36.6 09/22/2020    MCV 81.9 09/22/2020     09/22/2020     No results found for: TSH  Lab Results   Component Value Date    LABA1C 5.4 01/20/2021     No results found for: PSA, PSADIA     PHYSICAL EXAM:  /74   Pulse 108   Resp 14   Ht 4' 11\" (1.499 m)   Wt 143 lb 3.2 oz (65 kg)   LMP  (LMP Unknown)   SpO2 96%   BMI 28.92 kg/m²    Physical Exam  Constitutional:       Appearance: Normal appearance. She is well-developed. HENT:      Head: Normocephalic and atraumatic. Cardiovascular:      Rate and Rhythm: Regular rhythm. Tachycardia present. Heart sounds: No murmur. Pulmonary:      Effort: Pulmonary effort is normal.      Breath sounds: Normal breath sounds. No wheezing. Skin:     General: Skin is warm and dry. Neurological:      General: No focal deficit present. Mental Status: She is alert. Psychiatric:         Mood and Affect: Mood normal.         Behavior: Behavior normal.         Thought Content: Thought content normal.         Judgment: Judgment normal.       BP Readings from Last 5 Encounters:   04/21/21 126/74   04/19/21 118/74   02/10/21 (!) 140/80   02/03/21 (!) 178/86   01/20/21 (!) 140/84       Wt Readings from Last 5 Encounters:   04/21/21 143 lb 3.2 oz (65 kg)   04/19/21 143 lb (64.9 kg)   01/20/21 142 lb 12.8 oz (64.8 kg)   12/21/20 141 lb (64 kg)   10/15/20 148 lb (67.1 kg)      Sujatha Morris was seen today for follow-up. Diagnoses and all orders for this visit:    Essential hypertension    Prediabetes    Coronary artery calcification    Pustular psoriasis, Dr. Harpal Butts    Physical deconditioning    Tachycardia  -     EKG 12 Lead  -     CBC Auto Differential; Future  -     Comprehensive Metabolic Panel; Future  -     TSH with Reflex; Future  ekg nsr rate 91,  DE and QTC are normal  v2 t wave abn but not a new finding  Compared to prior ekg in chart    bp is very good continue [facility]  Has coronary artery ca - on statin and had stress test  Psoriasis is better  Has tachycardia suspect from deconditioning   She likes to walk  Suggest getting into better shape. Has awe in may  Says she feels fine.   Her heart rate  107 with pulmonary this week  HR  On ekg down  Recommend that she walks for conditioning or do exercise video etc.  Has a medicare fu visit in may        Patient Angelito Farfan to Be Alerted and Schedule When Eligible [email or web address]    [facility] (ONLY IF YOU LIVE OR WORK IN [facility])  [email or web address] research. [facility].org/surveys/?[email or web address]    [facility] Vacc[facility] Hotline  PLEASE CHECK TO SCHEDULE THROUGH YOUR [address] ACCOUNT AS WELL  [phone]    [facility]  [email or web address]  \"Appointments are limited based on state supplies and are available on a first-come, first-served basis. If we receive new supply from the state, we open new appointment slots through My [address] [place] [address] and [phone] every FRIDAY at [facility]  EST and will schedule appointments until our supply is exhausted. You do NOT have to be an [place] resident or a [address] [place] [address] patient to schedule an appointment. \"    [address] do NOT need to be a [place] patient to schedule through them  [email or web address]  [facility] ([address])  You do NOT need to be a [facility] patient to schedule through them  [email or web address]

## 2021-04-21 NOTE — PATIENT INSTRUCTIONS
[address] to [employer] and Schedule When Eligible  Kayla    [facility] (ONLY IF YOU LIVE OR WORK IN [place])  [email or web address] research. [facility].org/surveys/?[email or web address]    [facility] Vaccine Hotline  PLEASE CHECK TO SCHEDULE THROUGH YOUR [address] ACCOUNT AS WELL  [phone]    [facility]  [email or web address]  \"Appointments are limited based on state supplies and are available on a first-come, first-served basis. If we receive new supply from the state, we open new appointment slots through My [address] [place] [address] and [phone] every FRIDAY at [facility] and will schedule appointments until our supply is exhausted. You do NOT have to be an [place] resident or a [address] [place] [address] patient to schedule an appointment. \"    [address] do NOT need to be a [place] patient to schedule through them  [email or web address]  [address] ([address])  You do NOT need to be a [facility] patient to schedule through them  [email or web address]

## 2021-04-22 LAB
BASOPHILS ABSOLUTE: 0.1 K/UL (ref 0–0.2)
BASOPHILS RELATIVE PERCENT: 0.5 %
EOSINOPHILS ABSOLUTE: 0.2 K/UL (ref 0–0.6)
EOSINOPHILS RELATIVE PERCENT: 1.7 %
HCT VFR BLD CALC: 42.2 % (ref 36–48)
HEMOGLOBIN: 14.1 G/DL (ref 12–16)
LYMPHOCYTES ABSOLUTE: 2.9 K/UL (ref 1–5.1)
LYMPHOCYTES RELATIVE PERCENT: 25.5 %
MCH RBC QN AUTO: 28.2 PG (ref 26–34)
MCHC RBC AUTO-ENTMCNC: 33.3 G/DL (ref 31–36)
MCV RBC AUTO: 84.7 FL (ref 80–100)
MONOCYTES ABSOLUTE: 0.8 K/UL (ref 0–1.3)
MONOCYTES RELATIVE PERCENT: 7.1 %
NEUTROPHILS ABSOLUTE: 7.3 K/UL (ref 1.7–7.7)
NEUTROPHILS RELATIVE PERCENT: 65.2 %
PDW BLD-RTO: 14.9 % (ref 12.4–15.4)
PLATELET # BLD: 258 K/UL (ref 135–450)
PMV BLD AUTO: 9.9 FL (ref 5–10.5)
RBC # BLD: 4.99 M/UL (ref 4–5.2)
TSH REFLEX: 3.35 UIU/ML (ref 0.27–4.2)
WBC # BLD: 11.3 K/UL (ref 4–11)

## 2021-05-04 ENCOUNTER — OFFICE VISIT (OUTPATIENT)
Dept: FAMILY MEDICINE CLINIC | Age: 71
End: 2021-05-04
Payer: MEDICARE

## 2021-05-04 VITALS
WEIGHT: 142.8 LBS | HEIGHT: 59 IN | BODY MASS INDEX: 28.79 KG/M2 | RESPIRATION RATE: 14 BRPM | HEART RATE: 80 BPM | DIASTOLIC BLOOD PRESSURE: 78 MMHG | SYSTOLIC BLOOD PRESSURE: 116 MMHG | OXYGEN SATURATION: 95 %

## 2021-05-04 DIAGNOSIS — R59.9 ENLARGED LYMPH NODE: ICD-10-CM

## 2021-05-04 DIAGNOSIS — R59.0 MEDIASTINAL ADENOPATHY: ICD-10-CM

## 2021-05-04 DIAGNOSIS — Z00.00 ENCOUNTER FOR ANNUAL WELLNESS EXAM IN MEDICARE PATIENT: Primary | ICD-10-CM

## 2021-05-04 DIAGNOSIS — D72.829 LEUKOCYTOSIS, UNSPECIFIED TYPE: ICD-10-CM

## 2021-05-04 DIAGNOSIS — I10 ESSENTIAL HYPERTENSION: ICD-10-CM

## 2021-05-04 DIAGNOSIS — Z00.00 ROUTINE GENERAL MEDICAL EXAMINATION AT A HEALTH CARE FACILITY: ICD-10-CM

## 2021-05-04 DIAGNOSIS — R59.0 INGUINAL ADENOPATHY: ICD-10-CM

## 2021-05-04 PROCEDURE — G0438 PPPS, INITIAL VISIT: HCPCS | Performed by: INTERNAL MEDICINE

## 2021-05-04 ASSESSMENT — PATIENT HEALTH QUESTIONNAIRE - PHQ9
SUM OF ALL RESPONSES TO PHQ QUESTIONS 1-9: 0
SUM OF ALL RESPONSES TO PHQ QUESTIONS 1-9: 0
1. LITTLE INTEREST OR PLEASURE IN DOING THINGS: 0

## 2021-05-04 NOTE — PATIENT INSTRUCTIONS
You need new shingles vaccine and tetanus shot      [address] [place] to [employer] and Schedule When Eligible  Kayla    [facility] (ONLY IF YOU LIVE OR WORK IN [facility])  [email or web address] research. [facility].org/surveys/?[email or web address]    [facility] Vacc[facility] Hotline  PLEASE CHECK TO SCHEDULE THROUGH YOUR [address] ACCOUNT AS WELL  [phone]    [facility]  [email or web address]  \"Appointments are limited based on state supplies and are available on a first-come, first-served basis. If we receive new supply from the state, we open new appointment slots through My [facility] and [phone] every FRIDAY at [facility] and will schedule appointments until our supply is exhausted. You do NOT have to be an [place] resident or a [address] [place] [address] patient to schedule an appointment. \"    [address] do NOT need to be a [facility] patient to schedule through them  [email or web address]  [address] [place] ([address])  You do NOT need to be a [facility] patient to schedule through them  [email or web address]       Learning About [address]  What is a medical power of ? A medical power of , also called a durable power of  for health care, is one type of the legal forms called advance directives. It lets you name the person you want to make treatment decisions for you if you can't speak or decide for yourself. The person you choose is called your health care agent. This person is also called a health care proxy or health care surrogate. A medical power of  may be called something else in your state. How do you choose a health care agent? Choose your health care agent carefully. This person may or may not be a family member.   Talk to the person before you make your final decision. Make sure he or she is comfortable with this responsibility. It's a good idea to choose someone who:  · Is at least 25years old. · Knows you well and understands what makes life meaningful for you. · Understands your [place] and moral values. · Will do what you want, not what he or she wants. · Will be able to make difficult choices at a stressful time. · Will be able to refuse or stop treatment, if that is what you would want, even if you could die. · Will be firm and confident with health professionals if needed. · Will ask questions to get needed information. · Lives near you or agrees to travel to you if needed. Your family may help you make medical decisions while you can still be part of that process. But it's important to choose one person to be your health care agent in case you aren't able to make decisions for yourself. If you don't fill out the legal form and name a health care agent, the decisions your family can make may be limited. A health care agent may be called something else in your state. Who will make decisions for you if you don't have a health care agent? If you don't have a health care agent or a living will, you may not get the care you want. Decisions may be made by family members who disagree about your medical care. Or decisions may be made by a medical professional who doesn't know you well. In some cases, a  makes the decisions. When you name a health care agent, it is very clear who has the power to make health decisions for you. How do you name a health care agent? You name your health care agent on a legal form. This form is usually called a medical power of . Ask your hospital, state bar association, or office on aging where to find these forms. You must sign the form to make it legal. Some states require you to get the form notarized.  This means that a person called a  watches you sign the form and then he or she signs the form. Some states also require that two or more witnesses sign the form. Be sure to tell your family members and doctors who your health care agent is. Where can you learn more? Go to [email or web address] and sign in to your SensorWave account. Enter [id number] in the [facility] box to learn more about \"Learning About [address]. \"     If you do not have an account, please click on the \"Sign Up Now\" link. Current as of: July 17, 2020               Content Version: 12.8  © 2006-2021 Healthwise, Chunnel.[employer]. Care instructions adapted under license by [facility] [employer] [facility] ([facility]). If you have questions about a medical condition or this instruction, always ask your healthcare professional. [place] [address] any warranty or liability for your use of this information. Personalized Preventive Plan for Cherelle Mcdonald - 5/4/2021  Medicare offers a range of preventive health benefits. Some of the tests and screenings are paid in full while other may be subject to a deductible, co-insurance, and/or copay. Some of these benefits include a comprehensive review of your medical history including lifestyle, illnesses that may run in your family, and various assessments and screenings as appropriate. After reviewing your medical record and screening and assessments performed today your provider may have ordered immunizations, labs, imaging, and/or referrals for you. A list of these orders (if applicable) as well as your Preventive Care list are included within your After Visit Summary for your review. Other Preventive Recommendations:    · A preventive eye exam performed by an eye specialist is recommended every 1-2 years to screen for glaucoma; cataracts, macular degeneration, and other eye disorders. · A preventive dental visit is recommended every 6 months.   · Try to get at least 150 minutes of exercise per week or 10,000 steps per day on a pedometer . · Order or download the FREE \"Exercise & Physical Activity: Your Everyday Guide\" from [employer]. Call 8-317.924.3369 or search [employer] online. · You need 6512-4299 mg of calcium and 8369-5742 IU of vitamin D per day. It is possible to meet your calcium requirement with diet alone, but a vitamin D supplement is usually necessary to meet this goal.  · When exposed to the sun, use a sunscreen that protects against both UVA and UVB radiation with an SPF of 30 or greater. Reapply every 2 to 3 hours or after sweating, drying off with a towel, or swimming. · Always wear a seat belt when traveling in a car. Always wear a helmet when riding a bicycle or motorcycle.

## 2021-05-04 NOTE — PROGRESS NOTES
Medicare Annual Wellness Visit  Name: Holly Mesa Date: 2021   MRN: [id number] Sex: Female   Age: 65 y.o. Ethnicity: /   : 1950 Race: Antonio Phelan is here for Medicare AWV    Screenings for behavioral, psychosocial and functional/safety risks, and cognitive dysfunction are all negative except as indicated below. These results, as well as other patient data from the [address] [facility] [address] form, are documented in Flowsheets linked to this Encounter. Allergies   Allergen Reactions    Sulfa Antibiotics Rash       Prior to Visit Medications    Medication Sig Taking?  Authorizing Provider   amLODIPine (NORVASC) 10 MG tablet TAKE 1 TABLET BY MOUTH EVERY DAY Yes Haider Whaley MD   albuterol sulfate HFA (VENTOLIN HFA) 108 (90 Base) MCG/ACT inhaler Inhale 2 puffs into the lungs every 6 hours as needed for Wheezing Indications: Ventolin per PT Yes Haider Whaley MD   pravastatin (PRAVACHOL) 20 MG tablet Take 1 tablet by mouth daily Yes Haider Whaley MD   furosemide (LASIX) 20 MG tablet TAKE 1 TABLET BY MOUTH EVERY DAY Yes Haider Whaley MD   fluticasone-umeclidin-vilant (TRELEGY ELLIPTA) 100-62.5-25 MCG/INH AEPB Inhale 1 puff into the lungs daily Yes Haider Whaley MD   ipratropium-albuterol (DUONEB) 0.5-2.5 (3) MG/3ML SOLN nebulizer solution Take 3 mLs by nebulization every 4 hours as needed for Shortness of Breath Yes Haider Whaley MD   Naproxen Sodium (ALEVE) 220 MG CAPS Take 1 tablet by mouth as needed for Pain Yes Historical Provider, MD       Past Medical History:   Diagnosis Date    COPD (chronic obstructive pulmonary disease) ([facility] [address]     Glaucoma     Hx of blood clots     small-right leg    Hypertension        Past Surgical History:   Procedure Laterality Date    CERVIX BIOPSY N/A 1/3/2019    COLD KNIFE CONIZATION, ENDOCERVICAL CURETTAGE performed by Dulce Mccall MD at [facility], LAPAROSCOPIC N/A 2020    CHOLECYSTECTOMY LAPAROSCOPIC, WITH CHOLANGIOGRAM performed by Kenzie Stephens MD at [address]  09/20/2018    Dr. Mauricio Stands EXTRACTION Right 7/18/2019    PHACOEMULSIFICATION WITH INTRAOCULAR LENS IMPLANT performed by Bharati Govea MD at [address] [facility] EXTRACTION Left 8/1/2019    PHACOEMULSIFICATION WITH INTRAOCULAR TORIC LENS IMPLANT performed by Bharati Govea MD at [place] [address] LEEP  10/25/2018     LEEP, ecc    [place] BIOPSY/EXCISION, LYMPH NODE(S) Right 10/1/2018    EXCISIONAL BRIOPSY OF RIGHT GROIN LYMPH NODE performed by Kenzie Stehpens MD at [facility] N/A 10/25/2018    LOOP ELECTROSURGICAL EXCISION PROCEDURE performed by Daryl Purvis MD at [facility]      Dr. Stephanie Elizabeth       Family History   Problem Relation Age of Onset    Other Father         Tuberculosis in his 34\"s in [address] Diabetes Mother     Coronary Art Dis Mother     Diabetes Sister     High Blood Pressure Sister     Coronary Art Dis Sister        CareTeam (Including outside providers/suppliers regularly involved in providing care):   Patient Care Team:  Aniyah Urena MD as PCP - General (Internal Medicine)  Aniyah Urena MD as PCP - REHABILITATION [facility] Provider    Wt Readings from Last 3 Encounters:   05/04/21 142 lb 12.8 oz (64.8 kg)   04/21/21 143 lb 3.2 oz (65 kg)   04/19/21 143 lb (64.9 kg)     Vitals:    05/04/21 1020   BP: 116/78   Pulse: 80   Resp: 14   SpO2: 95%   Weight: 142 lb 12.8 oz (64.8 kg)   Height: 4' 11\" (1.499 m)     Body mass index is 28.84 kg/m². Based upon direct observation of the patient, evaluation of cognition reveals recent and remote memory intact. Reads, plays games ,compares picture    Patient's complete Health Risk Assessment and screening values have been reviewed and are found in Flowsheets.  The following problems were reviewed today and where indicated follow up appointments were made and/or referrals ordered. Positive Risk Factor Screenings with Interventions:          General Health and ACP:  General  In general, how would you say your health is?: Good  In the past 7 days, have you experienced any of the following? New or Increased Pain, New or Increased Fatigue, Loneliness, Social Isolation, Stress or Anger?: None of These  Do you get the social and emotional support that you need?: (!) No  Do you have a Living Will?: (!) No  Advance Directives     Power of [facility] Will ACP-Advance Directive ACP-Power of     Not on File Not on File Not on File Not on File      General Health Risk Interventions:  · Sometimes does not feel like she gets the support  · Talks to her sister but she is sick. Talks with son and argues with son. · Son lives with her  · Has grandson but does not see him.   · Does not have health care power of       Health Habits/Nutrition:  Health Habits/Nutrition  Do you exercise for at least 20 minutes 2-3 times per week?: (!) No  Have you lost any weight without trying in the past 3 months?: (!) Yes  Do you eat only one meal per day?: (!) Yes  Have you seen the dentist within the past year?: (!) No  Body mass index: (!) 28.84  Health Habits/Nutrition Interventions:   eats twice a day  Eats 3-4 in the afternoon  · Eats cereal in morning with  2 % milk  · Used  To weigh  99 lbs until she quit smoking  · Walked  For  3 hours last week     Safety:  Safety  Do you have working smoke detectors?: Yes  Have all throw rugs been removed or fastened?: (!) No  Do you have non-slip mats or surfaces in all bathtubs/showers?: (!) No  Do all of your stairways have a railing or banister?: Yes  Are your doorways, halls and stairs free of clutter?: Yes  Do you always fasten your seatbelt when you are in a car?: Yes  Safety Interventions:  · Has rug in the bathroom  Does not slide  · Does not want anything in the bathroom     Personalized Preventive Component      Latest Ref Rng & Units 8/30/2018 5/29/2018 3/12/2018 1/12/2018           6:55 PM 12:10 PM 11:36 AM  6:46 AM   WBC      4.0 - 11.0 K/uL 11.4 (H) 9.4 7.3 19.4 (H)   RBC      4.00 - 5.20 M/uL 4.83 4.80 4.39 4.52     Component      Latest Ref Rng & Units 1/11/2018 1/10/2018 1/9/2018 1/8/2018           5:35 AM  6:25 AM  6:30 AM  7:48 AM   WBC      4.0 - 11.0 K/uL 12.9 (H) 10.9 12.7 (H) 12.6 (H)   RBC      4.00 - 5.20 M/uL 4.49 4.46 4.43 4.44     Component      Latest Ref Rng & Units 1/7/2018 1/6/2018 9/26/2017 10/8/2015           5:53 AM  9:15 AM 11:20 AM 11:00 AM   WBC      4.0 - 11.0 K/uL 13.6 (H) 16.9 (H) 8.3 7.3   RBC      4.00 - 5.20 M/uL 4.47 4.53 4.63 4.50     Component      Latest Ref Rng & Units 7/18/2015 7/17/2015           6:46 AM  8:47 AM   WBC      4.0 - 11.0 K/uL 11.2 (H) 12.2 (H)   RBC      4.00 - 5.20 M/uL 4.52 4.97       Jeannette was seen today for medicare awv. Diagnoses and all orders for this visit:    Encounter for annual wellness exam in Medicare patient    Essential hypertension    Mediastinal adenopathy    Leukocytosis, unspecified type    Inguinal adenopathy work up  2018 Dr. Reymundo Tinoco.  neg path     Enlarged lymph node enlarged ing ly node 2018    will have her fu with Dr. Reymundo Tinoco who she saw in  2018  Has high white count off and on for yrs.

## 2021-05-04 NOTE — Clinical Note
Sloan Saenz,  I saw Lisandro for annual wellness exam  Was looking over her many white counts that have been elevated and I know she saw you before for Lymphadenopathy  Thought she probably should have a fu appt.   Thanks,  Mau How

## 2021-07-26 RX ORDER — AMLODIPINE BESYLATE 10 MG/1
TABLET ORAL
Qty: 90 TABLET | Refills: 0 | Status: SHIPPED | OUTPATIENT
Start: 2021-07-26 | End: 2021-10-21

## 2021-09-08 ENCOUNTER — OFFICE VISIT (OUTPATIENT)
Dept: FAMILY MEDICINE CLINIC | Age: 71
End: 2021-09-08
Payer: MEDICARE

## 2021-09-08 VITALS
OXYGEN SATURATION: 96 % | HEART RATE: 72 BPM | SYSTOLIC BLOOD PRESSURE: 115 MMHG | DIASTOLIC BLOOD PRESSURE: 76 MMHG | HEIGHT: 59 IN | WEIGHT: 146 LBS | BODY MASS INDEX: 29.43 KG/M2

## 2021-09-08 DIAGNOSIS — R60.0 EDEMA OF RIGHT LOWER LEG: Primary | ICD-10-CM

## 2021-09-08 DIAGNOSIS — R59.0 INGUINAL ADENOPATHY: ICD-10-CM

## 2021-09-08 DIAGNOSIS — R94.4 DECREASED GFR: ICD-10-CM

## 2021-09-08 DIAGNOSIS — I10 ESSENTIAL HYPERTENSION: ICD-10-CM

## 2021-09-08 PROCEDURE — 99214 OFFICE O/P EST MOD 30 MIN: CPT | Performed by: INTERNAL MEDICINE

## 2021-09-08 RX ORDER — ALBUTEROL SULFATE 90 UG/1
2 AEROSOL, METERED RESPIRATORY (INHALATION) EVERY 6 HOURS PRN
Qty: 1 EACH | Refills: 1 | Status: SHIPPED | OUTPATIENT
Start: 2021-09-08 | End: 2022-08-26 | Stop reason: SDUPTHER

## 2021-09-08 RX ORDER — FUROSEMIDE 20 MG/1
TABLET ORAL
Qty: 90 TABLET | Refills: 1 | Status: SHIPPED | OUTPATIENT
Start: 2021-09-08 | End: 2022-03-17

## 2021-09-08 RX ORDER — PRAVASTATIN SODIUM 20 MG
20 TABLET ORAL DAILY
Qty: 90 TABLET | Refills: 1 | Status: SHIPPED | OUTPATIENT
Start: 2021-09-08 | End: 2021-10-26 | Stop reason: ALTCHOICE

## 2021-09-08 ASSESSMENT — ENCOUNTER SYMPTOMS
VOMITING: 0
NAUSEA: 0
SHORTNESS OF BREATH: 0
COUGH: 0

## 2021-09-08 NOTE — PROGRESS NOTES
2021    Chief Complaint   Patient presents with    Other     med refill     HPI    Has copd and htn  Leg swelling  More on the right ,  Used to be on the left  Raises right leg at home  Copd on inhalers. Can't afford trelegy    htn on amlodipine  Right leg swollen    Here for follow up   Needs refills    Cholesterol med   Says she quit taking when it ran out. Review of Systems   Constitutional: Negative for appetite change and unexpected weight change. Respiratory: Negative for cough and shortness of breath. Gastrointestinal: Negative for nausea and vomiting.        Health Maintenance   Topic Date Due    COVID-19 Vaccine (1) Never done    Shingles Vaccine (2 of 3) 2016    DTaP/Tdap/Td vaccine (2 - Td or Tdap) 2019    Flu vaccine (1) 2021    A1C test (Diabetic or Prediabetic)  2022    Lipid screen  2022    Potassium monitoring  2022    Creatinine monitoring  2022    Annual Wellness Visit (AWV)  2022    Breast cancer screen  2023    Colon cancer screen colonoscopy  2028    DEXA (modify frequency per FRAX score)  Completed    Pneumococcal 65+ years Vaccine  Completed    Hepatitis C screen  Completed    Hepatitis A vaccine  Aged Out    Hepatitis B vaccine  Aged Out    Hib vaccine  Aged Out    Meningococcal (ACWY) vaccine  Aged Out      Social History     Tobacco Use    Smoking status: Former Smoker     Packs/day: 0.50     Years: 11.00     Pack years: 5.50     Types: Cigarettes     Start date: 1966     Quit date: 8/3/2015     Years since quittin.1    Smokeless tobacco: Never Used    Tobacco comment: counseled on tobacco exposure avoidance   Vaping Use    Vaping Use: Never used   Substance Use Topics    Alcohol use: No     Alcohol/week: 0.0 standard drinks    Drug use: No      Family History   Problem Relation Age of Onset    Other Father         Tuberculosis in his 34\"s in [address] Diabetes Mother     Coronary Art Dis Mother     Diabetes Sister     High Blood Pressure Sister     Coronary Art Dis Sister      Prior to Visit Medications    Medication Sig Taking?  Authorizing Provider   amLODIPine (NORVASC) 10 MG tablet TAKE 1 TABLET BY MOUTH EVERY DAY Yes Olvin Stanton MD   albuterol sulfate HFA (VENTOLIN HFA) 108 (90 Base) MCG/ACT inhaler Inhale 2 puffs into the lungs every 6 hours as needed for Wheezing Indications: Ventolin per PT Yes Olvin Stanton MD   furosemide (LASIX) 20 MG tablet TAKE 1 TABLET BY MOUTH EVERY DAY Yes Olvin Stanton MD   fluticasone-umeclidin-vilant (TRELEGY ELLIPTA) 100-62.5-25 MCG/INH AEPB Inhale 1 puff into the lungs daily Yes Olvin Stanton MD   ipratropium-albuterol (DUONEB) 0.5-2.5 (3) MG/3ML SOLN nebulizer solution Take 3 mLs by nebulization every 4 hours as needed for Shortness of Breath Yes Olvin Stanton MD   Naproxen Sodium (ALEVE) 220 MG CAPS Take 1 tablet by mouth as needed for Pain Yes Historical Provider, MD   pravastatin (PRAVACHOL) 20 MG tablet Take 1 tablet by mouth daily  Patient not taking: Reported on 9/8/2021  Olvin Stanton MD     Patient Active Problem List   Diagnosis    COPD (chronic obstructive pulmonary disease)--pfts show severe obst defect w severe decrease dlco  +bronchodilator response 6/14--repeat unchanged    Tobacco abuse-pt quit 7/15--cold turkey    Pulmonary nodule, left- on lung cancer screening program-  pet scan 6/16=unchanged billy nodule-little activity lung--pos activoity ge jct (still need to cont puln nod surv x 2 yrs)    Dependent edema--echo 10/15--nl ef--mild diast dysf--advised low salt diet/ stockings/ wt loss    Essential hypertension    Diastolic dysfunction--mild on echo 10/15--nl ef    Pustular psoriasis, Dr. Christina File    Centrilobular emphysema [facility])    Mediastinal adenopathy    SIRS (systemic inflammatory response syndrome) (HCC)    Leukocytosis    Ex-smoker    Tobacco abuse counseling    Elevated brain natriuretic peptide (BNP) level    Enlarged lymph node enlarged ing ly node 2018    Inguinal adenopathy work up  2018 Dr. Sangita Hammonds.  neg path     Edema of right lower extremity aug/sept  2018    Coronary artery calcification    S/P colonoscopy 9/18 [place] - 8mm polyp    HGSIL (high grade squamous intraepithelial lesion) on Pap smear of cervix    Prediabetes        LABS:     Lab Results   Component Value Date    LABA1C 5.4 01/20/2021    LABA1C 5.8 08/04/2020    LABA1C 5.0 03/12/2018    LABMICR YES 09/21/2020    LABMICR YES 01/06/2018       Lab Results   Component Value Date     04/21/2021     02/02/2021     09/22/2020    K 4.4 04/21/2021    K 4.8 02/10/2021    K 5.3 (H) 02/02/2021     04/21/2021     02/02/2021     (H) 09/22/2020    CO2 24 04/21/2021    CO2 27 02/02/2021    CO2 24 09/22/2020    BUN 9 04/21/2021    BUN 9 02/02/2021    BUN 8 09/22/2020    CREATININE 1.1 04/21/2021    CREATININE 0.9 02/02/2021    CREATININE 1.0 09/22/2020    GLUCOSE 98 04/21/2021    GLUCOSE 94 02/02/2021    GLUCOSE 135 (H) 09/22/2020    CALCIUM 9.3 04/21/2021    CALCIUM 9.9 02/02/2021    CALCIUM 9.0 09/22/2020       Lab Results   Component Value Date    CHOL 163 02/02/2021    CHOL 171 08/04/2020    CHOL 189 05/08/2019    TRIG 96 02/02/2021    TRIG 75 08/04/2020    TRIG 117 05/08/2019    HDL 74 (H) 02/02/2021    HDL 70 (H) 08/04/2020    HDL 66 (H) 05/08/2019    LDLCALC 70 02/02/2021    LDLCALC 86 08/04/2020    LDLCALC 100 (H) 05/08/2019       Lab Results   Component Value Date    ALT 19 04/21/2021    ALT 14 02/02/2021    ALT 38 09/22/2020    AST 21 04/21/2021    AST 17 02/02/2021    AST 41 (H) 09/22/2020       No results found for: TSH, T4FREE    Lab Results   Component Value Date    WBC 11.3 (H) 04/21/2021    WBC 8.9 09/22/2020    WBC 12.6 (H) 09/21/2020    HGB 14.1 04/21/2021    HGB 12.0 09/22/2020    HGB 13.3 09/21/2020    HCT 42.2 04/21/2021    HCT 36.6 09/22/2020    HCT 41.2 09/21/2020 MCV 84.7 04/21/2021    MCV 81.9 09/22/2020    MCV 81.3 09/21/2020     04/21/2021     09/22/2020     09/21/2020       Lab Results   Component Value Date    INR 0.96 07/17/2015        No results found for: PSA     No results found for: LABURIC      No results found for: PSA, PSADIA     PHYSICAL EXAM:  /76 (Site: Right Upper Arm, Position: Sitting, Cuff Size: Medium Adult)   Pulse 72   Ht 4' 11\" (1.499 m)   Wt 146 lb (66.2 kg)   LMP  (LMP Unknown)   SpO2 96%   BMI 29.49 kg/m²    Physical Exam  Constitutional:       Appearance: Normal appearance. She is well-developed. HENT:      Head: Normocephalic and atraumatic. Cardiovascular:      Rate and Rhythm: Normal rate and regular rhythm. Heart sounds: Normal heart sounds. No murmur heard. Pulmonary:      Effort: Pulmonary effort is normal.      Breath sounds: Normal breath sounds. No wheezing. Musculoskeletal:         General: Swelling (2-3 plus right lower ext ) present. Skin:     General: Skin is warm and dry. Comments: right lower ext   Discolored and scabbed   Neurological:      Mental Status: She is alert. Psychiatric:         Mood and Affect: Mood normal.         Behavior: Behavior normal.         Thought Content: Thought content normal.         Judgment: Judgment normal.       BP Readings from Last 5 Encounters:   09/08/21 115/76   05/04/21 116/78   04/21/21 126/74   04/19/21 118/74   02/10/21 (!) 140/80       Wt Readings from Last 5 Encounters:   09/08/21 146 lb (66.2 kg)   05/04/21 142 lb 12.8 oz (64.8 kg)   04/21/21 143 lb 3.2 oz (65 kg)   04/19/21 143 lb (64.9 kg)   01/20/21 142 lb 12.8 oz (64.8 kg)      Aries Argueat was seen today for other. Diagnoses and all orders for this visit:    Edema of right lower leg  -     Cancel: VL DUP LOWER EXTREMITY VENOUS RIGHT; Future  -     VL DUP LOWER EXTREMITY VENOUS RIGHT; Future    Decreased GFR  -     Comprehensive Metabolic Panel;  Future  -     Microalbumin / Creatinine Urine Ratio; Future    Essential hypertension  -     Comprehensive Metabolic Panel; Future  -     CBC Auto Differential; Future    Inguinal adenopathy work up  2018 Dr. William Gillespie.  neg path   -     VL DUP LOWER EXTREMITY VENOUS RIGHT; Future    Other orders  -     albuterol sulfate HFA (VENTOLIN HFA) 108 (90 Base) MCG/ACT inhaler; Inhale 2 puffs into the lungs every 6 hours as needed for Wheezing or Shortness of Breath Indications: Ventolin per PT  -     furosemide (LASIX) 20 MG tablet; TAKE 1 TABLET BY MOUTH EVERY DAY or every other day as needed  -     pravastatin (PRAVACHOL) 20 MG tablet;  Take 1 tablet by mouth daily    refilled meds  Can't afford trelogy  Has significant right leg edema  Advised  Compression hose if no clot is present  bp great  Needs to stop nsaids  gfr low  Follow up in  3 months

## 2021-09-09 DIAGNOSIS — N18.9 CHRONIC KIDNEY DISEASE, UNSPECIFIED CKD STAGE: Primary | ICD-10-CM

## 2021-09-10 ENCOUNTER — TELEPHONE (OUTPATIENT)
Dept: FAMILY MEDICINE CLINIC | Age: 71
End: 2021-09-10

## 2021-09-10 NOTE — TELEPHONE ENCOUNTER
She is getting her US and vein study done on 9/15  Will try and get her an appt with you on 9/17 to discuss results. Does she need a 40 min appt or is 20 min preferred?

## 2021-09-10 NOTE — TELEPHONE ENCOUNTER
----- Message from Angel Luis Hart sent at 9/10/2021 10:18 AM EDT -----  Subject: Message to Provider    QUESTIONS  Information for Provider? Pt called to inform Gus Sultana when her upcoming tests   are scheduled. Pt stated kidney test is 9/15/21 at 9:15am and leg test is   9/15/21 at 10:00AM. Please return call to pt  ---------------------------------------------------------------------------  --------------  CALL BACK INFO  What is the best way for the office to contact you? OK to leave message on   voicemail  Preferred Call Back Phone Number? [phone]  ---------------------------------------------------------------------------  --------------  SCRIPT ANSWERS  Relationship to Patient?  Self

## 2021-09-15 ENCOUNTER — HOSPITAL ENCOUNTER (OUTPATIENT)
Dept: VASCULAR LAB | Age: 71
Discharge: HOME | End: 2021-09-15
Payer: MEDICARE

## 2021-09-15 ENCOUNTER — HOSPITAL ENCOUNTER (OUTPATIENT)
Dept: ULTRASOUND IMAGING | Age: 71
Discharge: HOME | End: 2021-09-15
Payer: MEDICARE

## 2021-09-15 DIAGNOSIS — R59.0 INGUINAL ADENOPATHY: ICD-10-CM

## 2021-09-15 DIAGNOSIS — N18.9 CHRONIC KIDNEY DISEASE, UNSPECIFIED CKD STAGE: ICD-10-CM

## 2021-09-15 DIAGNOSIS — R60.0 EDEMA OF RIGHT LOWER LEG: ICD-10-CM

## 2021-09-15 PROCEDURE — 76770 US EXAM ABDO BACK WALL COMP: CPT

## 2021-09-15 PROCEDURE — 93971 EXTREMITY STUDY: CPT

## 2021-09-17 ENCOUNTER — OFFICE VISIT (OUTPATIENT)
Dept: FAMILY MEDICINE CLINIC | Age: 71
End: 2021-09-17
Payer: MEDICARE

## 2021-09-17 VITALS
HEART RATE: 94 BPM | HEIGHT: 59 IN | BODY MASS INDEX: 29.43 KG/M2 | OXYGEN SATURATION: 98 % | WEIGHT: 146 LBS | RESPIRATION RATE: 12 BRPM | DIASTOLIC BLOOD PRESSURE: 84 MMHG | SYSTOLIC BLOOD PRESSURE: 120 MMHG

## 2021-09-17 DIAGNOSIS — N18.9 CHRONIC KIDNEY DISEASE, UNSPECIFIED CKD STAGE: Primary | ICD-10-CM

## 2021-09-17 DIAGNOSIS — Z13.220 SCREENING FOR LIPID DISORDERS: ICD-10-CM

## 2021-09-17 DIAGNOSIS — R73.03 PREDIABETES: ICD-10-CM

## 2021-09-17 DIAGNOSIS — R59.1 LYMPHADENOPATHY: ICD-10-CM

## 2021-09-17 DIAGNOSIS — R87.613 HGSIL (HIGH GRADE SQUAMOUS INTRAEPITHELIAL LESION) ON PAP SMEAR OF CERVIX: ICD-10-CM

## 2021-09-17 DIAGNOSIS — R80.9 MICROALBUMINURIA: ICD-10-CM

## 2021-09-17 PROCEDURE — 99214 OFFICE O/P EST MOD 30 MIN: CPT | Performed by: INTERNAL MEDICINE

## 2021-09-17 RX ORDER — LOSARTAN POTASSIUM 25 MG/1
25 TABLET ORAL DAILY
Qty: 90 TABLET | Refills: 0 | Status: SHIPPED | OUTPATIENT
Start: 2021-09-17 | End: 2021-12-16

## 2021-09-17 NOTE — PATIENT INSTRUCTIONS
GFR  Means glomerular filtration rate   Normal >60  Yours is  49  You have some protein in the urine

## 2021-09-17 NOTE — PROGRESS NOTES
Amira Carpio (:  1950) is a 66 y.o. female,Established patient, here for evaluation of the following chief complaint(s):  Results         ASSESSMENT/PLAN:  1. Chronic kidney disease, unspecified CKD stage  -     CBC Auto Differential; Future  -     Comprehensive Metabolic Panel; Future  -     MARINA Reflex to Antibody Cascade; Future  2. Prediabetes  -     Hemoglobin A1C; Future  3. Screening for lipid disorders  -     Lipid Panel; Future  4. Microalbuminuria   5. Lymphadenopathy  -     Amb External Referral To Hematology Oncology  6. HGSIL (high grade squamous intraepithelial lesion) on Pap smear of cervix    Has right sided Lymphadenopathy in the ing area  Ho abn pap above  Will have her follow up again with DR. Nat Aviles for follow up  Had bx on the left groin  Has scar on the right groin  Discussed with her in detail CKD  Avoid nsaids  Start cozaar  hga1c  5.3 today  Follow up with me 3 months    See ma 2 weeks  bp ck on new med      Subjective   SUBJECTIVE/OBJECTIVE:  HPI  Follow up from the testing of the kidneys GFR   49 this month and in april    gfr    54   microalbumin  Level was   7.5   No kidney dx in the family    Does not eat a lot of carbs  Stopped exercising    Does not use ibuprofen or aleve  Does not take supplements or herbs    Eats a lot of salad     Lab Results   Component Value Date    LABA1C 5.4 2021    LABA1C 5.8 2020    LABA1C 5.0 2018     Path   18     FINAL DIAGNOSIS:     Left groin, core biopsy:   - Benign lymphoid tissue with fibrosis.    FREDDIE/FREDDIE        Comments  1/3/19  Pre-op diagnosis:   HIGH GRADE SQUAMOUS CELL INTRAEPITHELIAL   A. CONE OF CERVIX (stitch is at 15 O' clock)       Review of Systems   Constitutional: Negative for appetite change and unexpected weight change. Neurological: Negative for dizziness and headaches. Had bx of ly node on  18    Objective   Physical Exam  Constitutional:       Appearance: She is well-developed.

## 2021-09-17 NOTE — Clinical Note
I am seeing [facility] for right leg swelling and new worsening kidney function. Doppler showed LA right groin. I know that there was a lot that went on last time with this. Has a large scar on the right  She also was seeing dr Anayeli Florence  1[birth year]71  Pre-op diagnosis:   HIGH GRADE SQUAMOUS CELL INTRAEPITHELIAL   A. CONE OF CERVIX (stitch is at 15 O' clock)  Want to make sure we are not missing anything.   Thanks ,  Gustabo Huntley

## 2021-10-01 ENCOUNTER — NURSE ONLY (OUTPATIENT)
Dept: FAMILY MEDICINE CLINIC | Age: 71
End: 2021-10-01

## 2021-10-01 VITALS — DIASTOLIC BLOOD PRESSURE: 78 MMHG | SYSTOLIC BLOOD PRESSURE: 122 MMHG

## 2021-10-21 RX ORDER — AMLODIPINE BESYLATE 10 MG/1
TABLET ORAL
Qty: 90 TABLET | Refills: 0 | Status: SHIPPED | OUTPATIENT
Start: 2021-10-21 | End: 2022-01-20

## 2021-10-26 ENCOUNTER — OFFICE VISIT (OUTPATIENT)
Dept: PULMONOLOGY | Age: 71
End: 2021-10-26
Payer: MEDICARE

## 2021-10-26 VITALS
WEIGHT: 145.9 LBS | RESPIRATION RATE: 16 BRPM | HEART RATE: 80 BPM | DIASTOLIC BLOOD PRESSURE: 70 MMHG | SYSTOLIC BLOOD PRESSURE: 126 MMHG | HEIGHT: 59 IN | TEMPERATURE: 96.9 F | OXYGEN SATURATION: 95 % | BODY MASS INDEX: 29.41 KG/M2

## 2021-10-26 DIAGNOSIS — J43.2 CENTRILOBULAR EMPHYSEMA (HCC): ICD-10-CM

## 2021-10-26 DIAGNOSIS — Z23 NEED FOR INFLUENZA VACCINATION: Primary | ICD-10-CM

## 2021-10-26 DIAGNOSIS — R91.1 PULMONARY NODULE, LEFT: ICD-10-CM

## 2021-10-26 PROCEDURE — G0008 ADMIN INFLUENZA VIRUS VAC: HCPCS | Performed by: INTERNAL MEDICINE

## 2021-10-26 PROCEDURE — 90694 VACC AIIV4 NO PRSRV 0.5ML IM: CPT | Performed by: INTERNAL MEDICINE

## 2021-10-26 PROCEDURE — 99214 OFFICE O/P EST MOD 30 MIN: CPT | Performed by: INTERNAL MEDICINE

## 2021-10-26 ASSESSMENT — COPD QUESTIONNAIRES
QUESTION7_SLEEPQUALITY: 0
QUESTION6_LEAVINGHOUSE: 0
QUESTION4_WALKINCLINE: 2
QUESTION2_CHESTPHLEGM: 0
QUESTION8_ENERGYLEVEL: 1
QUESTION3_CHESTTIGHTNESS: 0
QUESTION5_HOMEACTIVITIES: 0
QUESTION1_COUGHFREQUENCY: 1
CAT_TOTALSCORE: 4

## 2021-10-26 NOTE — PROGRESS NOTES
REASON FOR CONSULTATION/CC:   Chief Complaint   Patient presents with    COPD     f/u COPD    Pulmonary Nodule     f/u pulmonary nodule        Consult at request of  Chase Johns MD     PCP: Chase Johns MD    HISTORY OF PRESENT ILLNESS: Pepe Braden is a 66y.o. year old female with a history of tobacco abuse who presents :          COPD     Using albuterol prn. Not using Trelegy. COPD Assessment Test (CAT)  Cough Assessment: 1  Phlegm Assessment: 0  Chest tightness: 0  Walking on an incline: 2  Home Activities: 0  Confident Leaving The Home: 0  Sleeping Soundly: 0  Have Energy: 1  Assessment Score: 4        Pulmonary nodule  Resolved on CT chest          REVIEW OF SYSTEMS:  Constitutional: Negative for fever   HENT: Negative for sore throat  Respiratory: Negative for dyspnea, cough  Cardiovascular: Negative for chest pain  Gastrointestinal: Negative for vomiting, diarrhea   Skin: Negative for rash  Psychiatric/Behavorial: Negative for anxiety     Objective:   PHYSICAL EXAM:  Blood pressure 126/70, pulse 80, temperature 96.9 °F (36.1 °C), temperature source Infrared, resp. rate 16, height 4' 11\" (1.499 m), weight 145 lb 14.4 oz (66.2 kg), SpO2 95 %, not currently breastfeeding.'  Gen: No distress. ENT:   Resp: No accessory muscle use. No crackles. No wheezes. No rhonchi. CV: Regular rate. Regular rhythm. No murmur or rub. No edema. Skin: Warm, dry, normal texture and turgor. No nodule on exposed extremities. M/S: No cyanosis. No clubbing. No joint deformity. Psych: Oriented x 3. No anxiety. Awake. Alert. Intact judgement and insight. Good Mood / Affect.   Memory appears in tact        Data Reviewed:       Assessment:     ·  Pulmonary Nodule, stable from 2015 to 2018   · COPD, FEV1 47%  2015    Plan:        Problem List Items Addressed This Visit     Pulmonary nodule, left- on lung cancer screening program-  pet scan 6/16=unchanged billy nodule-little activity lung--pos activoity ge jct (still need to cont puln nod surv x 2 yrs)     Stable nodule with prior nodule resolved. Centrilobular emphysema ([address]     Doing well on albuterol only. Off Trelegy at this point. CAT 5. Other Visit Diagnoses     Need for influenza vaccination    -  Primary    Relevant Orders    INFLUENZA, QUADV, ADJUVANTED, 72 YRS =, IM, PF, PREFILL SYR, 0.5ML (FLUAD) (Completed)        This note was transcribed using [id number] [employer]. Please disregard any translational errors.

## 2021-12-16 RX ORDER — LOSARTAN POTASSIUM 25 MG/1
25 TABLET ORAL DAILY
Qty: 90 TABLET | Refills: 0 | Status: SHIPPED | OUTPATIENT
Start: 2021-12-16 | End: 2021-12-17

## 2021-12-17 ENCOUNTER — OFFICE VISIT (OUTPATIENT)
Dept: FAMILY MEDICINE CLINIC | Age: 71
End: 2021-12-17
Payer: MEDICARE

## 2021-12-17 VITALS
HEART RATE: 81 BPM | DIASTOLIC BLOOD PRESSURE: 70 MMHG | RESPIRATION RATE: 10 BRPM | WEIGHT: 146 LBS | BODY MASS INDEX: 29.49 KG/M2 | OXYGEN SATURATION: 97 % | SYSTOLIC BLOOD PRESSURE: 130 MMHG

## 2021-12-17 DIAGNOSIS — R73.03 PREDIABETES: ICD-10-CM

## 2021-12-17 DIAGNOSIS — I10 ESSENTIAL HYPERTENSION: Primary | ICD-10-CM

## 2021-12-17 DIAGNOSIS — N18.9 CHRONIC KIDNEY DISEASE, UNSPECIFIED CKD STAGE: ICD-10-CM

## 2021-12-17 DIAGNOSIS — R80.9 MICROALBUMINURIA: ICD-10-CM

## 2021-12-17 PROCEDURE — 99214 OFFICE O/P EST MOD 30 MIN: CPT | Performed by: INTERNAL MEDICINE

## 2021-12-17 RX ORDER — LOSARTAN POTASSIUM 50 MG/1
50 TABLET ORAL DAILY
Qty: 90 TABLET | Refills: 1 | Status: SHIPPED | OUTPATIENT
Start: 2021-12-17 | End: 2022-04-22 | Stop reason: SDUPTHER

## 2021-12-17 ASSESSMENT — ENCOUNTER SYMPTOMS
VOMITING: 0
NAUSEA: 0

## 2021-12-17 NOTE — PATIENT INSTRUCTIONS
Inc cozaar  50 mg  Re ck bp and renal 10-14 lbs  Patient Education        Albumin Urine Test: About This Test  What is it? An albumin urine test checks urine for a protein called albumin. This protein is normally found in the blood. When the kidneys are damaged, small amounts of albumin leak into the urine. This is called albuminuria. If the amount of albumin is very small, but still abnormal, it is called microalbuminuria. You might provide a urine sample for your doctor during a visit. Your doctor might also ask you for a one-time sample at home or over a specific period of time, such as over 4 hours or 24 hours. Your doctor will tell you what to do. Why is this test done? This test is done to check for albumin in the urine. It helps tell your doctor how well your kidneys are working. This test is done most often to check the kidneys in people with diabetes. Other conditions also cause albuminuria. These conditions include high blood pressure, heart failure, and cirrhosis. The sooner your doctor knows you have kidney damage, the more your doctor can do to protect your kidneys. How do you prepare for the test?  · Do not exercise just before the test.  · Tell your doctor if you are having your period or have vaginal discharge. · Your doctor or the lab likely will give you the container you need to hold the urine. You will get instructions on when and how to collect the urine. This might be a one-time sample or a number of samples over a period of time. How to do the test  A morning urine sample gives the best information about albumin levels. 1. Wash your hands before you collect the urine. 2. Prepare the container. If the container has a lid, remove the lid and set it down with the inner surface up. 3. Clean the area around your penis or vagina. 4. Start to urinate into the toilet or urinal.  5. Collect the urine in the container.    After the urine has flowed for several seconds, place the collection container in the stream. Collect about 2 ounces (a quarter cup) of this \"midstream\" urine without stopping the flow. Don't touch the rim of the container to your genital area. 6. Finish urinating. 7. Replace the lid on the container. 8. Wash your hands. How to do the test  You collect your urine for a period of time, such as over 4 or 24 hours. Your doctor will give you a large container that holds about 1 gallon. You will use the container to collect your urine. · When you first get up, you empty your bladder. But don't save this urine. Write down the time you began. · For the set period of time, collect all your urine. Each time you urinate during this time period, collect your urine in a small, clean container. Then pour the urine into the large container. Don't touch the inside of either container with your fingers. · Don't get toilet paper, pubic hair, stool (feces), menstrual blood, or anything else in the urine sample. · Keep the collected urine in the refrigerator for the collection time. · Empty your bladder for the last time at or just before the end of the collection period. Add this urine to the large container. Then write down the time. What happens after the test?  · Follow your doctor's instructions for taking the urine to the doctor's office or lab. · You can go back to your usual activities right away. Follow-up care is a key part of your treatment and safety. Be sure to make and go to all appointments, and call your doctor if you are having problems. It's also a good idea to keep a list of the medicines you take. Ask your doctor when you can expect to have your test results. Where can you learn more? Go to [email or web address] and sign in to your [employer] account.  Enter L729 in the [employer] box to learn more about \"Albumin Urine Test: About This Test.\"     If you do not have an account, please click on the \"Sign Up Now\" link.  Current as of: December 17, 2020               Content Version: 13.0  © 2702-4204 [employer]. Care instructions adapted under license by [address] If you have questions about a medical condition or this instruction, always ask your healthcare professional. [address] any warranty or liability for your use of this information.

## 2021-12-17 NOTE — PROGRESS NOTES
Khloe Costa (:  1950) is a 66 y.o. female,Established patient, here for evaluation of the following chief complaint(s):  Hypertension and COPD         ASSESSMENT/PLAN:    Amadou Cortés was seen today for hypertension and copd. Diagnoses and all orders for this visit:    Essential hypertension  -     Renal Function Panel; Future    Prediabetes    Chronic kidney disease, unspecified CKD stage  -     Renal Function Panel; Future    Microalbuminuria on cozaar  -     Renal Function Panel; Future    Other orders  -     losartan (COZAAR) 50 MG tablet; Take 1 tablet by mouth daily    discussed  Her CKD     Patient Instructions     Patient Education        Albumin Urine Test: About This Test  What is it? An albumin urine test checks urine for a protein called albumin. This protein is normally found in the blood. When the kidneys are damaged, small amounts of albumin leak into the urine. This is called albuminuria. If the amount of albumin is very small, but still abnormal, it is called microalbuminuria. You might provide a urine sample for your doctor during a visit. Your doctor might also ask you for a one-time sample at home or over a specific period of time, such as over 4 hours or 24 hours. Your doctor will tell you what to do. Why is this test done? This test is done to check for albumin in the urine. It helps tell your doctor how well your kidneys are working. This test is done most often to check the kidneys in people with diabetes. Other conditions also cause albuminuria. These conditions include high blood pressure, heart failure, and cirrhosis. The sooner your doctor knows you have kidney damage, the more your doctor can do to protect your kidneys. How do you prepare for the test?  · Do not exercise just before the test.  · Tell your doctor if you are having your period or have vaginal discharge. · Your doctor or the lab likely will give you the container you need to hold the urine.  You will get instructions on when and how to collect the urine. This might be a one-time sample or a number of samples over a period of time. How to do the test  A morning urine sample gives the best information about albumin levels. 1. Wash your hands before you collect the urine. 2. Prepare the container. If the container has a lid, remove the lid and set it down with the inner surface up. 3. Clean the area around your penis or vagina. 4. Start to urinate into the toilet or urinal.  5. Collect the urine in the container. After the urine has flowed for several seconds, place the collection container in the stream. Collect about 2 ounces (a quarter cup) of this \"midstream\" urine without stopping the flow. Don't touch the rim of the container to your genital area. 6. Finish urinating. 7. Replace the lid on the container. 8. Wash your hands. How to do the test  You collect your urine for a period of time, such as over 4 or 24 hours. Your doctor will give you a large container that holds about 1 gallon. You will use the container to collect your urine. · When you first get up, you empty your bladder. But don't save this urine. Write down the time you began. · For the set period of time, collect all your urine. Each time you urinate during this time period, collect your urine in a small, clean container. Then pour the urine into the large container. Don't touch the inside of either container with your fingers. · Don't get toilet paper, pubic hair, stool (feces), menstrual blood, or anything else in the urine sample. · Keep the collected urine in the refrigerator for the collection time. · Empty your bladder for the last time at or just before the end of the collection period. Add this urine to the large container. Then write down the time. What happens after the test?  · Follow your doctor's instructions for taking the urine to the doctor's office or lab.   · You can go back to your usual activities right away.  Follow-up care is a key part of your treatment and safety. Be sure to make and go to all appointments, and call your doctor if you are having problems. It's also a good idea to keep a list of the medicines you take. Ask your doctor when you can expect to have your test results. Where can you learn more? Go to [email or web address]. org and sign in to your [employer] account. Enter [id number] in the [employer] box to learn more about \"Albumin Urine Test: About This Test.\"     If you do not have an account, please click on the \"Sign Up Now\" link. Current as of: December 17, 2020               Content Version: 13.0  © 2006-2021 Healthwise, [employer]. Care instructions adapted under license by [facility] ([facility]). If you have questions about a medical condition or this instruction, always ask your healthcare professional. [place] [address] any warranty or liability for your use of this information. SUBJECTIVE/OBJECTIVE:  HPI  Saw Rachel  Will see dr Wills next week    htn  bp good today.   Usually lower  norvasc 10 mg  And cozaar    Leg swelling   Taking  Lasix 20 mg daily  Some swelling  On the right  Sometimes wears compression hose    Only uses nebulizer as needed    Prediabetes resolved     ckd GFR  Lower 49 , 54  Has microalbumin in the urine    Kidney ultrasound was ok  , had a cyst right kidney    Lab Results   Component Value Date    LABA1C 5.3 10/01/2021    LABA1C 5.4 01/20/2021    LABA1C 5.8 08/04/2020    LABMICR 7.50 (H) 09/08/2021    LABMICR YES 09/21/2020    LABMICR YES 01/06/2018       Lab Results   Component Value Date     10/01/2021     09/08/2021     04/21/2021    K 5.1 10/01/2021    K 5.1 09/08/2021    K 4.4 04/21/2021     10/01/2021    CL 99 09/08/2021     04/21/2021    CO2 24 10/01/2021    CO2 21 09/08/2021    CO2 24 04/21/2021    BUN 11 10/01/2021    BUN 13 09/08/2021    BUN 9 04/21/2021    CREATININE 1.0 10/01/2021    CREATININE 1.1 09/08/2021    CREATININE 1.1 04/21/2021    GLUCOSE 102 (H) 10/01/2021    GLUCOSE 113 (H) 09/08/2021    GLUCOSE 98 04/21/2021    CALCIUM 9.6 10/01/2021    CALCIUM 10.4 09/08/2021    CALCIUM 9.3 04/21/2021       Lab Results   Component Value Date    CHOL 164 10/01/2021    CHOL 163 02/02/2021    CHOL 171 08/04/2020    TRIG 73 10/01/2021    TRIG 96 02/02/2021    TRIG 75 08/04/2020    HDL 72 (H) 10/01/2021    HDL 74 (H) 02/02/2021    HDL 70 (H) 08/04/2020    LDLCALC 77 10/01/2021    LDLCALC 70 02/02/2021    LDLCALC 86 08/04/2020       Lab Results   Component Value Date    ALT 14 10/01/2021    ALT 17 09/08/2021    ALT 19 04/21/2021    AST 16 10/01/2021    AST 23 09/08/2021    AST 21 04/21/2021       No results found for: TSH, T4FREE    Lab Results   Component Value Date    WBC 9.4 10/01/2021    WBC 11.4 (H) 09/08/2021    WBC 11.3 (H) 04/21/2021    HGB 13.1 10/01/2021    HGB 13.8 09/08/2021    HGB 14.1 04/21/2021    HCT 41.0 10/01/2021    HCT 42.7 09/08/2021    HCT 42.2 04/21/2021    MCV 87.0 10/01/2021    MCV 86.6 09/08/2021    MCV 84.7 04/21/2021     10/01/2021     09/08/2021     04/21/2021       Lab Results   Component Value Date    INR 0.96 07/17/2015        No results found for: PSA     No results found for: LABURIC       Review of Systems   Constitutional: Negative for appetite change and unexpected weight change. Gastrointestinal: Negative for nausea and vomiting. Objective   Physical Exam  Constitutional:       Appearance: Normal appearance. She is well-developed. HENT:      Head: Normocephalic and atraumatic. Cardiovascular:      Rate and Rhythm: Normal rate and regular rhythm. Heart sounds: Normal heart sounds. No murmur heard. Pulmonary:      Effort: Pulmonary effort is normal.      Breath sounds: Normal breath sounds. No wheezing. Skin:     General: Skin is warm and dry. Neurological:      Mental Status: She is alert.    Psychiatric: Mood and Affect: Mood normal.         Behavior: Behavior normal.         Thought Content:  Thought content normal.         Judgment: Judgment normal.           --Shraddha Metzger MD

## 2021-12-27 ENCOUNTER — OFFICE VISIT (OUTPATIENT)
Dept: GYNECOLOGY | Age: 71
End: 2021-12-27
Payer: MEDICARE

## 2021-12-27 VITALS
BODY MASS INDEX: 29.69 KG/M2 | DIASTOLIC BLOOD PRESSURE: 68 MMHG | WEIGHT: 147 LBS | SYSTOLIC BLOOD PRESSURE: 138 MMHG | HEART RATE: 99 BPM | TEMPERATURE: 97.7 F

## 2021-12-27 DIAGNOSIS — Z01.419 WELL WOMAN EXAM WITH ROUTINE GYNECOLOGICAL EXAM: Primary | ICD-10-CM

## 2021-12-27 DIAGNOSIS — Z12.31 ENCOUNTER FOR SCREENING MAMMOGRAM FOR MALIGNANT NEOPLASM OF BREAST: ICD-10-CM

## 2021-12-27 DIAGNOSIS — N90.89 VULVAR IRRITATION: ICD-10-CM

## 2021-12-27 PROCEDURE — G8417 CALC BMI ABV UP PARAM F/U: HCPCS | Performed by: OBSTETRICS & GYNECOLOGY

## 2021-12-27 PROCEDURE — G0101 CA SCREEN;PELVIC/BREAST EXAM: HCPCS | Performed by: OBSTETRICS & GYNECOLOGY

## 2021-12-27 PROCEDURE — G8427 DOCREV CUR MEDS BY ELIG CLIN: HCPCS | Performed by: OBSTETRICS & GYNECOLOGY

## 2021-12-27 RX ORDER — CLOTRIMAZOLE AND BETAMETHASONE DIPROPIONATE 10; .64 MG/G; MG/G
CREAM TOPICAL
Qty: 1 EACH | Refills: 3 | Status: SHIPPED | OUTPATIENT
Start: 2021-12-27

## 2021-12-27 NOTE — PROGRESS NOTES
Subjective:      Patient ID: Buzz Luke is a 66 y.o. female. HPI  pts here for annual gyn exam.  She notes occas vulvar irritation. Denies bleeding. Up to date with mammogram and colon cancer screening. Review of Systems Pertinent review of systems items discussed above. All others systems items not discussed above were negative. Objective:   Physical Exam  Constitutional:       Appearance: She is well-developed. HENT:      Head: Normocephalic and atraumatic. Neck:      Thyroid: No thyromegaly. Trachea: No tracheal deviation. Cardiovascular:      Rate and Rhythm: Normal rate and regular rhythm. Heart sounds: Normal heart sounds. No murmur heard. Pulmonary:      Effort: Pulmonary effort is normal. No respiratory distress. Breath sounds: Normal breath sounds. No wheezing or rales. Chest:   Breasts:      Right: No mass, nipple discharge or skin change. Left: No mass, nipple discharge or skin change. Abdominal:      General: There is no distension. Palpations: Abdomen is soft. There is no mass. Tenderness: There is no abdominal tenderness. There is no rebound. Genitourinary:     Labia:         Right: No lesion. Left: No lesion. Vagina: Normal. No foreign body. No vaginal discharge. Cervix: No cervical motion tenderness, discharge or friability. Uterus: Not deviated, not enlarged, not fixed and not tender. Adnexa:         Right: No mass or tenderness. Left: No mass or tenderness. Rectum: Normal. Guaiac result negative. No mass or external hemorrhoid. Comments: Pap performed. Musculoskeletal:         General: Normal range of motion. Lymphadenopathy:      Cervical: No cervical adenopathy. Neurological:      Mental Status: She is alert and oriented to person, place, and time. Assessment:   Normal gyn exam, vulvar irritation     Plan:   rx lotrisone. Call with results.   F/u annual gyn exam.       Claude Last MD

## 2022-03-17 DIAGNOSIS — I10 ESSENTIAL HYPERTENSION: Primary | ICD-10-CM

## 2022-03-17 DIAGNOSIS — R73.03 PREDIABETES: ICD-10-CM

## 2022-03-17 DIAGNOSIS — N18.9 CHRONIC KIDNEY DISEASE, UNSPECIFIED CKD STAGE: ICD-10-CM

## 2022-03-17 RX ORDER — FUROSEMIDE 20 MG/1
TABLET ORAL
Qty: 90 TABLET | Refills: 1 | Status: SHIPPED | OUTPATIENT
Start: 2022-03-17

## 2022-03-17 RX ORDER — LOSARTAN POTASSIUM 25 MG/1
25 TABLET ORAL DAILY
Qty: 90 TABLET | Refills: 0 | OUTPATIENT
Start: 2022-03-17

## 2022-04-22 ENCOUNTER — OFFICE VISIT (OUTPATIENT)
Dept: FAMILY MEDICINE CLINIC | Age: 72
End: 2022-04-22
Payer: MEDICARE

## 2022-04-22 VITALS
HEART RATE: 86 BPM | HEIGHT: 59 IN | RESPIRATION RATE: 16 BRPM | OXYGEN SATURATION: 97 % | DIASTOLIC BLOOD PRESSURE: 80 MMHG | BODY MASS INDEX: 29.64 KG/M2 | SYSTOLIC BLOOD PRESSURE: 144 MMHG | WEIGHT: 147 LBS

## 2022-04-22 DIAGNOSIS — J43.2 CENTRILOBULAR EMPHYSEMA (HCC): ICD-10-CM

## 2022-04-22 DIAGNOSIS — R80.9 MICROALBUMINURIA: ICD-10-CM

## 2022-04-22 DIAGNOSIS — R73.03 PREDIABETES: ICD-10-CM

## 2022-04-22 DIAGNOSIS — L03.115 CELLULITIS OF RIGHT LEG: Primary | ICD-10-CM

## 2022-04-22 DIAGNOSIS — I10 ESSENTIAL HYPERTENSION: ICD-10-CM

## 2022-04-22 DIAGNOSIS — D72.829 LEUKOCYTOSIS, UNSPECIFIED TYPE: ICD-10-CM

## 2022-04-22 DIAGNOSIS — N18.9 CHRONIC KIDNEY DISEASE, UNSPECIFIED CKD STAGE: ICD-10-CM

## 2022-04-22 PROCEDURE — 99214 OFFICE O/P EST MOD 30 MIN: CPT | Performed by: INTERNAL MEDICINE

## 2022-04-22 RX ORDER — DOXYCYCLINE HYCLATE 100 MG
100 TABLET ORAL 2 TIMES DAILY
Qty: 14 TABLET | Refills: 0 | Status: SHIPPED | OUTPATIENT
Start: 2022-04-22 | End: 2022-04-29

## 2022-04-22 RX ORDER — LOSARTAN POTASSIUM 50 MG/1
50 TABLET ORAL DAILY
Qty: 90 TABLET | Refills: 1 | Status: SHIPPED | OUTPATIENT
Start: 2022-04-22 | End: 2022-10-17

## 2022-04-22 RX ORDER — AMLODIPINE BESYLATE 10 MG/1
TABLET ORAL
Qty: 90 TABLET | Refills: 1 | Status: SHIPPED | OUTPATIENT
Start: 2022-04-22 | End: 2022-04-26

## 2022-04-22 ASSESSMENT — ENCOUNTER SYMPTOMS
SHORTNESS OF BREATH: 0
COUGH: 0
NAUSEA: 0
VOMITING: 0

## 2022-04-22 NOTE — PROGRESS NOTES
Sidney Newman (:  1950) is a 66 y.o. female, here for evaluation of the following chief complaint(s):  Hypertension (pt states she is well  states she took all of the losartan. she didn't think she had to stay on  it  )         ASSESSMENT/PLAN:    Yasmin Deleon was seen today for hypertension. Diagnoses and all orders for this visit:    Cellulitis of right leg  -     doxycycline hyclate (VIBRA-TABS) 100 MG tablet; Take 1 tablet by mouth 2 times daily for 7 days    Centrilobular emphysema (HCC)    Chronic kidney disease, unspecified CKD stage  -     Comprehensive Metabolic Panel; Future  -     Urinalysis with Reflex to Culture  -     Microalbumin / Creatinine Urine Ratio; Future    Essential hypertension    Leukocytosis, unspecified type  -     CBC with Auto Differential; Future    Prediabetes  -     Hemoglobin A1C; Future    Microalbuminuria  -     Urinalysis with Reflex to Culture  -     Microalbumin / Creatinine Urine Ratio; Future    Other orders  -     amLODIPine (NORVASC) 10 MG tablet; TAKE 1 TABLET BY MOUTH EVERY DAY  -     losartan (COZAAR) 50 MG tablet; Take 1 tablet by mouth daily      SUBJECTIVE/OBJECTIVE:  HPI  htn  On amlodipine  Also was on cozaar but she stopped taking the cozaar bc she finished it . Stopped a week ago  . Copd   Seeing dr Marcelo Ventura  q 6 months  Sees him next week        Review of Systems   Constitutional: Negative for chills and fever. Respiratory: Negative for cough and shortness of breath. Gastrointestinal: Negative for nausea and vomiting. Objective   Physical Exam  Constitutional:       Appearance: Normal appearance. She is well-developed. HENT:      Head: Normocephalic and atraumatic. Cardiovascular:      Rate and Rhythm: Normal rate and regular rhythm. Heart sounds: No murmur heard. Pulmonary:      Effort: Pulmonary effort is normal.      Breath sounds: Wheezing present. Skin:     General: Skin is warm and dry.       Comments: Red , scabbed in the right lower ext cellulitis   Neurological:      Mental Status: She is alert. Psychiatric:         Mood and Affect: Mood normal.         Behavior: Behavior normal.         Thought Content:  Thought content normal.         Judgment: Judgment normal.                Gay Ferguson MD

## 2022-04-26 ENCOUNTER — OFFICE VISIT (OUTPATIENT)
Dept: FAMILY MEDICINE CLINIC | Age: 72
End: 2022-04-26
Payer: MEDICARE

## 2022-04-26 ENCOUNTER — OFFICE VISIT (OUTPATIENT)
Dept: PULMONOLOGY | Age: 72
End: 2022-04-26
Payer: MEDICARE

## 2022-04-26 VITALS
HEART RATE: 90 BPM | HEIGHT: 59 IN | DIASTOLIC BLOOD PRESSURE: 78 MMHG | RESPIRATION RATE: 16 BRPM | OXYGEN SATURATION: 97 % | WEIGHT: 147 LBS | SYSTOLIC BLOOD PRESSURE: 136 MMHG | BODY MASS INDEX: 29.64 KG/M2

## 2022-04-26 VITALS
TEMPERATURE: 97.4 F | DIASTOLIC BLOOD PRESSURE: 60 MMHG | RESPIRATION RATE: 16 BRPM | OXYGEN SATURATION: 96 % | SYSTOLIC BLOOD PRESSURE: 130 MMHG | BODY MASS INDEX: 29.43 KG/M2 | HEART RATE: 84 BPM | HEIGHT: 59 IN | WEIGHT: 146 LBS

## 2022-04-26 DIAGNOSIS — D72.829 LEUKOCYTOSIS, UNSPECIFIED TYPE: ICD-10-CM

## 2022-04-26 DIAGNOSIS — L02.415 CELLULITIS AND ABSCESS OF RIGHT LOWER EXTREMITY: Primary | ICD-10-CM

## 2022-04-26 DIAGNOSIS — R73.03 PREDIABETES: ICD-10-CM

## 2022-04-26 DIAGNOSIS — J43.2 CENTRILOBULAR EMPHYSEMA (HCC): ICD-10-CM

## 2022-04-26 DIAGNOSIS — L40.1 PUSTULAR PSORIASIS: ICD-10-CM

## 2022-04-26 DIAGNOSIS — L03.115 CELLULITIS AND ABSCESS OF RIGHT LOWER EXTREMITY: Primary | ICD-10-CM

## 2022-04-26 DIAGNOSIS — R80.9 MICROALBUMINURIA: ICD-10-CM

## 2022-04-26 DIAGNOSIS — N18.9 CHRONIC KIDNEY DISEASE, UNSPECIFIED CKD STAGE: ICD-10-CM

## 2022-04-26 LAB
A/G RATIO: 1.6 (ref 1.1–2.2)
ALBUMIN SERPL-MCNC: 4.5 G/DL (ref 3.4–5)
ALP BLD-CCNC: 104 U/L (ref 40–129)
ALT SERPL-CCNC: 15 U/L (ref 10–40)
ANION GAP SERPL CALCULATED.3IONS-SCNC: 15 MMOL/L (ref 3–16)
AST SERPL-CCNC: 15 U/L (ref 15–37)
BASOPHILS ABSOLUTE: 0 K/UL (ref 0–0.2)
BASOPHILS RELATIVE PERCENT: 0.5 %
BILIRUB SERPL-MCNC: 0.3 MG/DL (ref 0–1)
BUN BLDV-MCNC: 17 MG/DL (ref 7–20)
CALCIUM SERPL-MCNC: 9.4 MG/DL (ref 8.3–10.6)
CHLORIDE BLD-SCNC: 104 MMOL/L (ref 99–110)
CO2: 21 MMOL/L (ref 21–32)
CREAT SERPL-MCNC: 0.7 MG/DL (ref 0.6–1.2)
CREATININE URINE: 259.5 MG/DL (ref 28–259)
EOSINOPHILS ABSOLUTE: 0.2 K/UL (ref 0–0.6)
EOSINOPHILS RELATIVE PERCENT: 2.1 %
GFR AFRICAN AMERICAN: >60
GFR NON-AFRICAN AMERICAN: >60
GLUCOSE BLD-MCNC: 95 MG/DL (ref 70–99)
HCT VFR BLD CALC: 41.4 % (ref 36–48)
HEMOGLOBIN: 13.4 G/DL (ref 12–16)
LYMPHOCYTES ABSOLUTE: 2.1 K/UL (ref 1–5.1)
LYMPHOCYTES RELATIVE PERCENT: 24.5 %
MCH RBC QN AUTO: 27.6 PG (ref 26–34)
MCHC RBC AUTO-ENTMCNC: 32.3 G/DL (ref 31–36)
MCV RBC AUTO: 85.3 FL (ref 80–100)
MICROALBUMIN UR-MCNC: <1.2 MG/DL
MICROALBUMIN/CREAT UR-RTO: ABNORMAL MG/G (ref 0–30)
MONOCYTES ABSOLUTE: 0.7 K/UL (ref 0–1.3)
MONOCYTES RELATIVE PERCENT: 7.9 %
NEUTROPHILS ABSOLUTE: 5.6 K/UL (ref 1.7–7.7)
NEUTROPHILS RELATIVE PERCENT: 65 %
PDW BLD-RTO: 14.1 % (ref 12.4–15.4)
PLATELET # BLD: 255 K/UL (ref 135–450)
PMV BLD AUTO: 9.2 FL (ref 5–10.5)
POTASSIUM SERPL-SCNC: 4.1 MMOL/L (ref 3.5–5.1)
RBC # BLD: 4.85 M/UL (ref 4–5.2)
SODIUM BLD-SCNC: 140 MMOL/L (ref 136–145)
TOTAL PROTEIN: 7.4 G/DL (ref 6.4–8.2)
WBC # BLD: 8.6 K/UL (ref 4–11)

## 2022-04-26 PROCEDURE — 99213 OFFICE O/P EST LOW 20 MIN: CPT | Performed by: INTERNAL MEDICINE

## 2022-04-26 RX ORDER — AMLODIPINE BESYLATE 10 MG/1
TABLET ORAL
Qty: 90 TABLET | Refills: 1 | Status: SHIPPED | OUTPATIENT
Start: 2022-04-26

## 2022-04-26 ASSESSMENT — PATIENT HEALTH QUESTIONNAIRE - PHQ9
SUM OF ALL RESPONSES TO PHQ QUESTIONS 1-9: 0
SUM OF ALL RESPONSES TO PHQ QUESTIONS 1-9: 0
SUM OF ALL RESPONSES TO PHQ9 QUESTIONS 1 & 2: 0
SUM OF ALL RESPONSES TO PHQ QUESTIONS 1-9: 0
1. LITTLE INTEREST OR PLEASURE IN DOING THINGS: 0
SUM OF ALL RESPONSES TO PHQ QUESTIONS 1-9: 0
2. FEELING DOWN, DEPRESSED OR HOPELESS: 0

## 2022-04-26 NOTE — PROGRESS NOTES
REASON FOR CONSULTATION/CC:   No chief complaint on file. Consult at request of  Naa Bello MD     PCP: Naa Bello MD    HISTORY OF PRESENT ILLNESS: Yesi Roth is a 66y.o. year old female with a history of tobacco abuse who presents :          COPD     Using albuterol prn. Not using Trelegy. COPD Assessment Test (CAT)                                        Objective:   PHYSICAL EXAM:  Blood pressure 130/60, pulse 84, temperature 97.4 °F (36.3 °C), temperature source Infrared, resp. rate 16, height 4' 11\" (1.499 m), weight 146 lb (66.2 kg), SpO2 96 %, not currently breastfeeding.'  Gen: No distress. ENT:   Resp: No accessory muscle use. No crackles. No wheezes. No rhonchi. CV: Regular rate. Regular rhythm. No murmur or rub. No edema. Skin: Warm, dry, normal texture and turgor. No nodule on exposed extremities. M/S: No cyanosis. No clubbing. No joint deformity. Psych: Oriented x 3. No anxiety. Awake. Alert. Intact judgement and insight. Good Mood / Affect. Memory appears in tact        Data Reviewed:       Assessment:     ·  Pulmonary Nodule, stable from 2015 to 2018   · COPD, FEV1 47%  2015    Plan:        Problem List Items Addressed This Visit     COPD (chronic obstructive pulmonary disease)--pfts show severe obst defect w severe decrease dlco  +bronchodilator response 6/14--repeat unchanged      Continues to be controlled on albuterol only. Has not used Trelegy since last visit. Discussed follow-up yearly versus as needed. Patient would prefer to follow-up on yearly basis. This note was transcribed using [employer]. Please disregard any translational errors. English

## 2022-04-26 NOTE — ASSESSMENT & PLAN NOTE
Continues to be controlled on albuterol only. Has not used Trelegy since last visit. Discussed follow-up yearly versus as needed. Patient would prefer to follow-up on yearly basis.

## 2022-04-26 NOTE — PROGRESS NOTES
Sidney Newman (:  1950) is a 66 y.o. female, here for evaluation of the following chief complaint(s):  Cellulitis (it's on her right leg and she said it started swelling and started itching )         ASSESSMENT/PLAN:    Yasmin Deleon was seen today for cellulitis. 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 Musculoskeletal:        Leg less swollen    Skin: Positive for rash. Itchy in the leg          Objective   Physical Exam  Constitutional:       Appearance: Normal appearance. HENT:      Head: Normocephalic and atraumatic. Pulmonary:      Effort: Pulmonary effort is normal.   Musculoskeletal:         General: No swelling. Comments: Swelling less on the right leg  Redness is also less  Skin peeling  Improved     Neurological:      Mental Status: She is alert. Psychiatric:         Mood and Affect: Mood normal.         Behavior: Behavior normal.         Thought Content:  Thought content normal.         Judgment: Judgment normal.            Kendra Santos MD

## 2022-04-27 LAB
ESTIMATED AVERAGE GLUCOSE: 108.3 MG/DL
HBA1C MFR BLD: 5.4 %

## 2022-08-26 ENCOUNTER — OFFICE VISIT (OUTPATIENT)
Dept: FAMILY MEDICINE CLINIC | Age: 72
End: 2022-08-26
Payer: MEDICARE

## 2022-08-26 VITALS
HEART RATE: 98 BPM | SYSTOLIC BLOOD PRESSURE: 134 MMHG | WEIGHT: 139 LBS | OXYGEN SATURATION: 95 % | HEIGHT: 59 IN | BODY MASS INDEX: 28.02 KG/M2 | TEMPERATURE: 97.9 F | RESPIRATION RATE: 12 BRPM | DIASTOLIC BLOOD PRESSURE: 68 MMHG

## 2022-08-26 DIAGNOSIS — M79.10 MYALGIA: ICD-10-CM

## 2022-08-26 DIAGNOSIS — R05.9 COUGH: Primary | ICD-10-CM

## 2022-08-26 DIAGNOSIS — J43.2 CENTRILOBULAR EMPHYSEMA (HCC): ICD-10-CM

## 2022-08-26 DIAGNOSIS — R05.9 COUGH: ICD-10-CM

## 2022-08-26 DIAGNOSIS — Z00.00 MEDICARE ANNUAL WELLNESS VISIT, SUBSEQUENT: ICD-10-CM

## 2022-08-26 PROCEDURE — G0439 PPPS, SUBSEQ VISIT: HCPCS | Performed by: INTERNAL MEDICINE

## 2022-08-26 PROCEDURE — 1123F ACP DISCUSS/DSCN MKR DOCD: CPT | Performed by: INTERNAL MEDICINE

## 2022-08-26 PROCEDURE — 99213 OFFICE O/P EST LOW 20 MIN: CPT | Performed by: INTERNAL MEDICINE

## 2022-08-26 RX ORDER — IPRATROPIUM BROMIDE AND ALBUTEROL SULFATE 2.5; .5 MG/3ML; MG/3ML
1 SOLUTION RESPIRATORY (INHALATION) EVERY 4 HOURS PRN
Qty: 50 ML | Refills: 0 | Status: SHIPPED | OUTPATIENT
Start: 2022-08-26

## 2022-08-26 RX ORDER — FLUTICASONE FUROATE, UMECLIDINIUM BROMIDE AND VILANTEROL TRIFENATATE 100; 62.5; 25 UG/1; UG/1; UG/1
1 POWDER RESPIRATORY (INHALATION) DAILY
Qty: 1 EACH | Refills: 5 | Status: SHIPPED | OUTPATIENT
Start: 2022-08-26

## 2022-08-26 RX ORDER — ALBUTEROL SULFATE 90 UG/1
2 AEROSOL, METERED RESPIRATORY (INHALATION) EVERY 6 HOURS PRN
Qty: 1 EACH | Refills: 1 | Status: SHIPPED | OUTPATIENT
Start: 2022-08-26

## 2022-08-26 SDOH — ECONOMIC STABILITY: FOOD INSECURITY: WITHIN THE PAST 12 MONTHS, THE FOOD YOU BOUGHT JUST DIDN'T LAST AND YOU DIDN'T HAVE MONEY TO GET MORE.: NEVER TRUE

## 2022-08-26 SDOH — ECONOMIC STABILITY: FOOD INSECURITY: WITHIN THE PAST 12 MONTHS, YOU WORRIED THAT YOUR FOOD WOULD RUN OUT BEFORE YOU GOT MONEY TO BUY MORE.: NEVER TRUE

## 2022-08-26 ASSESSMENT — PATIENT HEALTH QUESTIONNAIRE - PHQ9
SUM OF ALL RESPONSES TO PHQ QUESTIONS 1-9: 0
SUM OF ALL RESPONSES TO PHQ QUESTIONS 1-9: 0
SUM OF ALL RESPONSES TO PHQ9 QUESTIONS 1 & 2: 0
2. FEELING DOWN, DEPRESSED OR HOPELESS: 0
1. LITTLE INTEREST OR PLEASURE IN DOING THINGS: 0
SUM OF ALL RESPONSES TO PHQ QUESTIONS 1-9: 0
SUM OF ALL RESPONSES TO PHQ QUESTIONS 1-9: 0

## 2022-08-26 ASSESSMENT — ENCOUNTER SYMPTOMS
SHORTNESS OF BREATH: 0
COUGH: 1
CONSTIPATION: 0
DIARRHEA: 0

## 2022-08-26 ASSESSMENT — SOCIAL DETERMINANTS OF HEALTH (SDOH): HOW HARD IS IT FOR YOU TO PAY FOR THE VERY BASICS LIKE FOOD, HOUSING, MEDICAL CARE, AND HEATING?: NOT HARD AT ALL

## 2022-08-26 ASSESSMENT — LIFESTYLE VARIABLES
HOW OFTEN DO YOU HAVE A DRINK CONTAINING ALCOHOL: NEVER
HOW MANY STANDARD DRINKS CONTAINING ALCOHOL DO YOU HAVE ON A TYPICAL DAY: PATIENT DOES NOT DRINK

## 2022-08-26 NOTE — PATIENT INSTRUCTIONS
Personalized Preventive Plan for Kathie Mendosa - 8/26/2022  Medicare offers a range of preventive health benefits. Some of the tests and screenings are paid in full while other may be subject to a deductible, co-insurance, and/or copay. Some of these benefits include a comprehensive review of your medical history including lifestyle, illnesses that may run in your family, and various assessments and screenings as appropriate. After reviewing your medical record and screening and assessments performed today your provider may have ordered immunizations, labs, imaging, and/or referrals for you. A list of these orders (if applicable) as well as your Preventive Care list are included within your After Visit Summary for your review. Other Preventive Recommendations:    A preventive eye exam performed by an eye specialist is recommended every 1-2 years to screen for glaucoma; cataracts, macular degeneration, and other eye disorders. A preventive dental visit is recommended every 6 months. Try to get at least 150 minutes of exercise per week or 10,000 steps per day on a pedometer . Order or download the FREE \"Exercise & Physical Activity: Your Everyday Guide\" from [employer]. Call 2-839.926.3880 or search [employer] online. You need 1091-6900 mg of calcium and 1537-9794 IU of vitamin D per day. It is possible to meet your calcium requirement with diet alone, but a vitamin D supplement is usually necessary to meet this goal.  When exposed to the sun, use a sunscreen that protects against both UVA and UVB radiation with an SPF of 30 or greater. Reapply every 2 to 3 hours or after sweating, drying off with a towel, or swimming. Always wear a seat belt when traveling in a car. Always wear a helmet when riding a bicycle or motorcycle.

## 2022-08-26 NOTE — PROGRESS NOTES
(1.499 m)      Body mass index is 28.07 kg/m². Allergies   Allergen Reactions    Sulfa Antibiotics Rash     Prior to Visit Medications    Medication Sig Taking? Authorizing Provider   amLODIPine (NORVASC) 10 MG tablet TAKE 1 TABLET BY MOUTH EVERY DAY Yes Kristal Mccauley MD   losartan (COZAAR) 50 MG tablet Take 1 tablet by mouth daily Yes Kristal Mccauley MD   furosemide (LASIX) 20 MG tablet TAKE 1 TABLET BY MOUTH EVERY DAY OR EVERY OTHER DAY AS NEEDED Yes Kristal Mccauley MD   clotrimazole-betamethasone (LOTRISONE) 1-0.05 % cream Apply topically 2 times daily. Yes Lora Nettles MD   albuterol sulfate HFA (VENTOLIN HFA) 108 (90 Base) MCG/ACT inhaler Inhale 2 puffs into the lungs every 6 hours as needed for Wheezing or Shortness of Breath Indications: Ventolin per PT Yes Kristal Mccauley MD   fluticasone-umeclidin-vilant (TRELEGY ELLIPTA) 100-62.5-25 MCG/INH AEPB Inhale 1 puff into the lungs daily Yes Kristal Mccauley MD   ipratropium-albuterol (DUONEB) 0.5-2.5 (3) MG/3ML SOLN nebulizer solution Take 3 mLs by nebulization every 4 hours as needed for Shortness of Breath Yes Kristal Mccauley MD       [facility] (Including outside providers/suppliers regularly involved in providing care):   Patient Care Team:  Kristal Mccauley MD as PCP - General (Internal Medicine)  Kirstal Mccauley MD as PCP - [facility] Empaneled Provider     Reviewed and updated this visit:  Tobacco  Allergies  Meds  Problems  Med Hx  Surg Hx  Soc Hx  Fam Hx        Andre Dennison was seen today for chronic kidney disease and medicare awv.     Diagnoses and all orders for this visit:    Medicare annual wellness visit, subsequent

## 2022-08-26 NOTE — PROGRESS NOTES
Farooq Nelson (:  1950) is a 74 y.o. female, here for evaluation of the following chief complaint(s):  Chronic Kidney Disease and Medicare AWV         ASSESSMENT/PLAN:    Vangie Patiño was seen today for chronic kidney disease and medicare awv. Diagnoses and all orders for this visit:    Cough  -     COVID-19; Future      Myalgia  -     COVID-19; Future    Centrilobular emphysema ([address]  -     ipratropium-albuterol (DUONEB) 0.5-2.5 (3) MG/3ML SOLN nebulizer solution; Take 3 mLs by nebulization every 4 hours as needed for Shortness of Breath    Other orders  -     albuterol sulfate HFA (VENTOLIN HFA) 108 (90 Base) MCG/ACT inhaler; Inhale 2 puffs into the lungs every 6 hours as needed for Wheezing or Shortness of Breath Indications: Ventolin per PT  -     fluticasone-umeclidin-vilant (Sadie Shiley) 100-62.5-25 MCG/INH AEPB; Inhale 1 puff into the lungs daily     Pt may have had covid  Better  Had a little bit of wheezing   Ran out of inhalers  Trelegy will probably be too expensive for her  Ran out of samples but will give rx    Subjective   SUBJECTIVE/OBJECTIVE:  HPI  Co coughing for one week   Not really concerned about it  Not shortness of breath  Cough is dry    Had bodyache , but gone now  Started feeling bad  Started Friday night at  9 pm  Had bodyaches sat  also   was sweating   Started coughing Friday night    Review of Systems   Constitutional:  Negative for chills and fever. Respiratory:  Positive for cough. Negative for shortness of breath. Gastrointestinal:  Negative for constipation and diarrhea. Objective   Physical Exam  Constitutional:       Appearance: She is well-developed. She is not ill-appearing. HENT:      Head: Normocephalic and atraumatic. Cardiovascular:      Rate and Rhythm: Normal rate and regular rhythm. Heart sounds: No murmur heard. Pulmonary:      Effort: Pulmonary effort is normal.      Breath sounds: Wheezing (wheezing a little) present.    Abdominal: Palpations: Abdomen is soft. Tenderness: There is no abdominal tenderness. Skin:     General: Skin is warm and dry. Neurological:      Mental Status: She is alert. Psychiatric:         Mood and Affect: Mood normal.         Behavior: Behavior normal.         Thought Content:  Thought content normal.         Judgment: Judgment normal.          Chuyita Barker MD

## 2022-08-28 LAB — SARS-COV-2: DETECTED

## 2022-09-22 DIAGNOSIS — J43.2 CENTRILOBULAR EMPHYSEMA (HCC): ICD-10-CM

## 2022-09-22 RX ORDER — IPRATROPIUM BROMIDE AND ALBUTEROL SULFATE 2.5; .5 MG/3ML; MG/3ML
SOLUTION RESPIRATORY (INHALATION)
Qty: 180 ML | OUTPATIENT
Start: 2022-09-22

## 2022-09-22 RX ORDER — IPRATROPIUM BROMIDE AND ALBUTEROL SULFATE 2.5; .5 MG/3ML; MG/3ML
SOLUTION RESPIRATORY (INHALATION)
Qty: 180 ML | Status: CANCELLED | OUTPATIENT
Start: 2022-09-22

## 2022-09-26 ENCOUNTER — OFFICE VISIT (OUTPATIENT)
Dept: FAMILY MEDICINE CLINIC | Age: 72
End: 2022-09-26
Payer: MEDICARE

## 2022-09-26 VITALS
HEART RATE: 67 BPM | BODY MASS INDEX: 29.03 KG/M2 | DIASTOLIC BLOOD PRESSURE: 68 MMHG | RESPIRATION RATE: 16 BRPM | WEIGHT: 144 LBS | SYSTOLIC BLOOD PRESSURE: 126 MMHG | HEIGHT: 59 IN | OXYGEN SATURATION: 96 %

## 2022-09-26 DIAGNOSIS — N18.9 CHRONIC KIDNEY DISEASE, UNSPECIFIED CKD STAGE: ICD-10-CM

## 2022-09-26 DIAGNOSIS — I49.9 IRREGULAR HEART RATE: Primary | ICD-10-CM

## 2022-09-26 DIAGNOSIS — I10 ESSENTIAL HYPERTENSION: ICD-10-CM

## 2022-09-26 LAB
A/G RATIO: 1.7 (ref 1.1–2.2)
ALBUMIN SERPL-MCNC: 4.6 G/DL (ref 3.4–5)
ALP BLD-CCNC: 85 U/L (ref 40–129)
ALT SERPL-CCNC: 14 U/L (ref 10–40)
ANION GAP SERPL CALCULATED.3IONS-SCNC: 13 MMOL/L (ref 3–16)
AST SERPL-CCNC: 14 U/L (ref 15–37)
BILIRUB SERPL-MCNC: 0.3 MG/DL (ref 0–1)
BUN BLDV-MCNC: 21 MG/DL (ref 7–20)
CALCIUM SERPL-MCNC: 9.8 MG/DL (ref 8.3–10.6)
CHLORIDE BLD-SCNC: 102 MMOL/L (ref 99–110)
CO2: 26 MMOL/L (ref 21–32)
CREAT SERPL-MCNC: 1 MG/DL (ref 0.6–1.2)
GFR AFRICAN AMERICAN: >60
GFR NON-AFRICAN AMERICAN: 54
GLUCOSE BLD-MCNC: 92 MG/DL (ref 70–99)
POTASSIUM SERPL-SCNC: 4.3 MMOL/L (ref 3.5–5.1)
SODIUM BLD-SCNC: 141 MMOL/L (ref 136–145)
TOTAL PROTEIN: 7.3 G/DL (ref 6.4–8.2)

## 2022-09-26 PROCEDURE — 90694 VACC AIIV4 NO PRSRV 0.5ML IM: CPT | Performed by: INTERNAL MEDICINE

## 2022-09-26 PROCEDURE — 93000 ELECTROCARDIOGRAM COMPLETE: CPT | Performed by: INTERNAL MEDICINE

## 2022-09-26 PROCEDURE — G0008 ADMIN INFLUENZA VIRUS VAC: HCPCS | Performed by: INTERNAL MEDICINE

## 2022-09-26 PROCEDURE — 1123F ACP DISCUSS/DSCN MKR DOCD: CPT | Performed by: INTERNAL MEDICINE

## 2022-09-26 PROCEDURE — 99214 OFFICE O/P EST MOD 30 MIN: CPT | Performed by: INTERNAL MEDICINE

## 2022-09-26 NOTE — PROGRESS NOTES
GLUCOSE 113 (H) 09/08/2021    CALCIUM 9.4 04/26/2022    CALCIUM 9.6 10/01/2021    CALCIUM 10.4 09/08/2021       Lab Results   Component Value Date    CHOL 164 10/01/2021    CHOL 163 02/02/2021    CHOL 171 08/04/2020    TRIG 73 10/01/2021    TRIG 96 02/02/2021    TRIG 75 08/04/2020    HDL 72 (H) 10/01/2021    HDL 74 (H) 02/02/2021    HDL 70 (H) 08/04/2020    LDLCALC 77 10/01/2021    LDLCALC 70 02/02/2021    LDLCALC 86 08/04/2020       Lab Results   Component Value Date    ALT 15 04/26/2022    ALT 14 10/01/2021    ALT 17 09/08/2021    AST 15 04/26/2022    AST 16 10/01/2021    AST 23 09/08/2021       No results found for: TSH, T4FREE, T3FREE    Lab Results   Component Value Date    WBC 8.6 04/26/2022    WBC 9.4 10/01/2021    WBC 11.4 (H) 09/08/2021    HGB 13.4 04/26/2022    HGB 13.1 10/01/2021    HGB 13.8 09/08/2021    HCT 41.4 04/26/2022    HCT 41.0 10/01/2021    HCT 42.7 09/08/2021    MCV 85.3 04/26/2022    MCV 87.0 10/01/2021    MCV 86.6 09/08/2021     04/26/2022     10/01/2021     09/08/2021       No results found for: PSA     No results found for: LABURIC     Vitals:    09/26/22 1116   BP: 126/68   Pulse: 67   Resp: 16   SpO2: 96%       Review of Systems       Objective   Physical Exam  Constitutional:       Appearance: Normal appearance. HENT:      Head: Normocephalic and atraumatic. Cardiovascular:      Rate and Rhythm: Normal rate. Rhythm irregular. Pulmonary:      Effort: Pulmonary effort is normal.      Comments: Right leg little swollen     No open lesions   Some chronic appearing discoloration  Neurological:      Mental Status: She is alert. Psychiatric:         Mood and Affect: Mood normal.         Behavior: Behavior normal.         Thought Content:  Thought content normal.         Judgment: Judgment normal.          Susan Zamarripa MD

## 2022-10-17 RX ORDER — LOSARTAN POTASSIUM 50 MG/1
50 TABLET ORAL DAILY
Qty: 90 TABLET | Refills: 1 | Status: SHIPPED | OUTPATIENT
Start: 2022-10-17

## 2022-12-28 ENCOUNTER — OFFICE VISIT (OUTPATIENT)
Dept: FAMILY MEDICINE CLINIC | Age: 72
End: 2022-12-28
Payer: MEDICARE

## 2022-12-28 VITALS
DIASTOLIC BLOOD PRESSURE: 62 MMHG | RESPIRATION RATE: 16 BRPM | OXYGEN SATURATION: 92 % | HEART RATE: 95 BPM | BODY MASS INDEX: 29.43 KG/M2 | HEIGHT: 59 IN | SYSTOLIC BLOOD PRESSURE: 138 MMHG | WEIGHT: 146 LBS

## 2022-12-28 DIAGNOSIS — I10 ESSENTIAL HYPERTENSION: ICD-10-CM

## 2022-12-28 DIAGNOSIS — R73.03 PREDIABETES: ICD-10-CM

## 2022-12-28 DIAGNOSIS — N18.9 CHRONIC KIDNEY DISEASE, UNSPECIFIED CKD STAGE: ICD-10-CM

## 2022-12-28 DIAGNOSIS — J43.2 CENTRILOBULAR EMPHYSEMA (HCC): ICD-10-CM

## 2022-12-28 DIAGNOSIS — L40.1 PUSTULAR PSORIASIS: Primary | ICD-10-CM

## 2022-12-28 LAB
A/G RATIO: 1.7 (ref 1.1–2.2)
ALBUMIN SERPL-MCNC: 4.6 G/DL (ref 3.4–5)
ALP BLD-CCNC: 101 U/L (ref 40–129)
ALT SERPL-CCNC: 19 U/L (ref 10–40)
ANION GAP SERPL CALCULATED.3IONS-SCNC: 13 MMOL/L (ref 3–16)
AST SERPL-CCNC: 19 U/L (ref 15–37)
BILIRUB SERPL-MCNC: 0.3 MG/DL (ref 0–1)
BUN BLDV-MCNC: 24 MG/DL (ref 7–20)
CALCIUM SERPL-MCNC: 9.6 MG/DL (ref 8.3–10.6)
CHLORIDE BLD-SCNC: 103 MMOL/L (ref 99–110)
CO2: 25 MMOL/L (ref 21–32)
CREAT SERPL-MCNC: 0.9 MG/DL (ref 0.6–1.2)
GFR SERPL CREATININE-BSD FRML MDRD: >60 ML/MIN/{1.73_M2}
GLUCOSE BLD-MCNC: 95 MG/DL (ref 70–99)
POTASSIUM SERPL-SCNC: 4.7 MMOL/L (ref 3.5–5.1)
SODIUM BLD-SCNC: 141 MMOL/L (ref 136–145)
TOTAL PROTEIN: 7.3 G/DL (ref 6.4–8.2)

## 2022-12-28 PROCEDURE — 99214 OFFICE O/P EST MOD 30 MIN: CPT | Performed by: INTERNAL MEDICINE

## 2022-12-28 PROCEDURE — 3074F SYST BP LT 130 MM HG: CPT | Performed by: INTERNAL MEDICINE

## 2022-12-28 PROCEDURE — 3078F DIAST BP <80 MM HG: CPT | Performed by: INTERNAL MEDICINE

## 2022-12-28 PROCEDURE — 1123F ACP DISCUSS/DSCN MKR DOCD: CPT | Performed by: INTERNAL MEDICINE

## 2022-12-28 NOTE — PROGRESS NOTES
Kimber Dillon (:  1950) is a 74 y.o. female, here for evaluation of the following chief complaint(s):  Medication Check (Patient is here for a medication follow up)           Assessment/Plan        Subjective   SUBJECTIVE/OBJECTIVE:  HPI  Here for follow up   On amlodipine for bp and losartan  Cking bp at home with a watch  Bp just a little high today   138/62    Has CKD  Does not see nephrology    Has pustular psoriasis  She does not follow up with  dr Darnell Pino   Skin red off and on depending if she scrubs it     No wheezing or coughing        Lab Results   Component Value Date    LABA1C 5.4 2022    LABA1C 5.3 10/01/2021    LABA1C 5.4 2021    LABMICR <1.20 2022    LABMICR 7.50 (H) 2021    LABMICR YES 2020       Lab Results   Component Value Date     2022     2022     10/01/2021    K 4.3 2022    K 4.1 2022    K 5.1 10/01/2021     2022     2022     10/01/2021    CO2 26 2022    CO2 21 2022    CO2 24 10/01/2021    BUN 21 (H) 2022    BUN 17 2022    BUN 11 10/01/2021    CREATININE 1.0 2022    CREATININE 0.7 2022    CREATININE 1.0 10/01/2021    GLUCOSE 92 2022    GLUCOSE 95 2022    GLUCOSE 102 (H) 10/01/2021    CALCIUM 9.8 2022    CALCIUM 9.4 2022    CALCIUM 9.6 10/01/2021       Lab Results   Component Value Date    CHOL 164 10/01/2021    CHOL 163 2021    CHOL 171 2020    TRIG 73 10/01/2021    TRIG 96 2021    TRIG 75 2020    HDL 72 (H) 10/01/2021    HDL 74 (H) 2021    HDL 70 (H) 2020    LDLCALC 77 10/01/2021    LDLCALC 70 2021    LDLCALC 86 2020       Lab Results   Component Value Date    ALT 14 2022    ALT 15 2022    ALT 14 10/01/2021    AST 14 (L) 2022    AST 15 2022    AST 16 10/01/2021       No results found for: TSH, T4FREE, T3FREE    Lab Results   Component Value Date    WBC 8.6 04/26/2022    WBC 9.4 10/01/2021    WBC 11.4 (H) 09/08/2021    HGB 13.4 04/26/2022    HGB 13.1 10/01/2021    HGB 13.8 09/08/2021    HCT 41.4 04/26/2022    HCT 41.0 10/01/2021    HCT 42.7 09/08/2021    MCV 85.3 04/26/2022    MCV 87.0 10/01/2021    MCV 86.6 09/08/2021     04/26/2022     10/01/2021     09/08/2021       No results found for: PSA     No results found for: LABURIC     Vitals:    12/28/22 1111   BP: 138/62   Pulse: 95   Resp: 16   SpO2: 92%       Review of Systems       Objective   Physical Exam  Constitutional:       Appearance: Normal appearance. She is well-developed. HENT:      Head: Normocephalic and atraumatic. Cardiovascular:      Rate and Rhythm: Normal rate and regular rhythm. Heart sounds: Normal heart sounds. No murmur heard. Pulmonary:      Effort: Pulmonary effort is normal.      Breath sounds: Normal breath sounds. No wheezing. Skin:     General: Skin is warm and dry. Comments: Very pink , scaling skin distal right leg    Neurological:      Mental Status: She is alert. Psychiatric:         Mood and Affect: Mood normal.         Behavior: Behavior normal.         Thought Content:  Thought content normal.         Judgment: Judgment normal.          Diana Ramos MD

## 2022-12-29 LAB
ESTIMATED AVERAGE GLUCOSE: 105.4 MG/DL
HBA1C MFR BLD: 5.3 %

## 2023-01-01 NOTE — ANESTHESIA PRE PROCEDURE
Department of Anesthesiology  Preprocedure Note       Name:  Hazel Choi   Age:  77 y.o.  :  1950                                          MRN:  [id number]         Date:  2019      Surgeon: Paris Litten):  Myla Ramirez MD    Procedure: Procedure(s):  Right eye cataract    Medications prior to admission:   Prior to Admission medications    Medication Sig Start Date End Date Taking?  Authorizing Provider   PROLENSA 0.07 % SOLN PLACE ONE DROP IN OPERATIVE EYE IN THE MORNING AND EVENING THE DAY BEFORE SURGERY, THE MORNING OF SURGERY AND EVERY MORNING FOR 4 WEEKS AFTE 19  Yes Historical Provider, MD   ofloxacin (OCUFLOX) 0.3 % solution 3 drops 4 times daily   Yes Historical Provider, MD   prednisoLONE acetate (PRED FORTE) 1 % ophthalmic suspension 1 drop 4 times daily   Yes Historical Provider, MD   furosemide (LASIX) 20 MG tablet TAKE 1 TABLET BY MOUTH EVERY DAY 19  Yes Celso Gibson MD   amLODIPine (NORVASC) 10 MG tablet TAKE 1 TABLET BY MOUTH EVERY DAY 19  Yes Celso Gibson MD   albuterol sulfate HFA (PROAIR HFA) 108 (90 Base) MCG/ACT inhaler Inhale 2 puffs into the lungs every 6 hours as needed for Wheezing 19  Yes Celso Gibson MD   Fluticasone-Umeclidin-Vilant (TRELEGY ELLIPTA) 100-62.5-25 MCG/INH AEPB Inhale 1 Inhaler into the lungs daily 18  Yes Celso Gibson MD   Naproxen Sodium (ALEVE) 220 MG CAPS Take 1 tablet by mouth as needed for Pain   Yes Historical Provider, MD   Respiratory Therapy Supplies (NEBULIZER/TUBING/MOUTHPIECE) KIT 1 kit by Does not apply route daily 18  Yes Tomas Aguila MD   ipratropium-albuterol (DUONEB) 0.5-2.5 (3) MG/3ML SOLN nebulizer solution Inhale 3 mLs into the lungs every 4 hours  Patient taking differently: Inhale 1 vial into the lungs every 4 hours as needed  18  Yes Tomas Aguila MD       Current medications:    Current Facility-Administered Medications   Medication Dose Route Frequency Provider Last Rate
Vaccine status unknown

## 2023-01-23 RX ORDER — AMLODIPINE BESYLATE 10 MG/1
TABLET ORAL
Qty: 90 TABLET | Refills: 1 | Status: SHIPPED | OUTPATIENT
Start: 2023-01-23

## 2023-04-17 RX ORDER — LOSARTAN POTASSIUM 50 MG/1
50 TABLET ORAL DAILY
Qty: 90 TABLET | Refills: 1 | Status: SHIPPED | OUTPATIENT
Start: 2023-04-17

## 2023-04-26 ENCOUNTER — OFFICE VISIT (OUTPATIENT)
Dept: FAMILY MEDICINE CLINIC | Age: 73
End: 2023-04-26
Payer: MEDICARE

## 2023-04-26 VITALS
BODY MASS INDEX: 29.84 KG/M2 | WEIGHT: 148 LBS | SYSTOLIC BLOOD PRESSURE: 130 MMHG | RESPIRATION RATE: 16 BRPM | DIASTOLIC BLOOD PRESSURE: 66 MMHG | HEART RATE: 87 BPM | OXYGEN SATURATION: 94 % | HEIGHT: 59 IN

## 2023-04-26 DIAGNOSIS — Z13.220 SCREENING FOR LIPID DISORDERS: ICD-10-CM

## 2023-04-26 DIAGNOSIS — R80.9 MICROALBUMINURIA: ICD-10-CM

## 2023-04-26 DIAGNOSIS — R73.03 PREDIABETES: ICD-10-CM

## 2023-04-26 DIAGNOSIS — J43.2 CENTRILOBULAR EMPHYSEMA (HCC): ICD-10-CM

## 2023-04-26 DIAGNOSIS — I10 ESSENTIAL HYPERTENSION: ICD-10-CM

## 2023-04-26 DIAGNOSIS — I10 ESSENTIAL HYPERTENSION: Primary | ICD-10-CM

## 2023-04-26 DIAGNOSIS — L40.1 PUSTULAR PSORIASIS: ICD-10-CM

## 2023-04-26 DIAGNOSIS — M79.89 RIGHT LEG SWELLING: ICD-10-CM

## 2023-04-26 LAB
ALBUMIN SERPL-MCNC: 4.5 G/DL (ref 3.4–5)
ALBUMIN/GLOB SERPL: 1.5 {RATIO} (ref 1.1–2.2)
ALP SERPL-CCNC: 105 U/L (ref 40–129)
ALT SERPL-CCNC: 17 U/L (ref 10–40)
ANION GAP SERPL CALCULATED.3IONS-SCNC: 12 MMOL/L (ref 3–16)
AST SERPL-CCNC: 19 U/L (ref 15–37)
BILIRUB SERPL-MCNC: 0.3 MG/DL (ref 0–1)
BUN SERPL-MCNC: 13 MG/DL (ref 7–20)
CALCIUM SERPL-MCNC: 9.5 MG/DL (ref 8.3–10.6)
CHLORIDE SERPL-SCNC: 102 MMOL/L (ref 99–110)
CHOLEST SERPL-MCNC: 182 MG/DL (ref 0–199)
CO2 SERPL-SCNC: 26 MMOL/L (ref 21–32)
CREAT SERPL-MCNC: 1.1 MG/DL (ref 0.6–1.2)
CREAT UR-MCNC: 342.3 MG/DL (ref 28–259)
GFR SERPLBLD CREATININE-BSD FMLA CKD-EPI: 52 ML/MIN/{1.73_M2}
GLUCOSE SERPL-MCNC: 94 MG/DL (ref 70–99)
HDLC SERPL-MCNC: 84 MG/DL (ref 40–60)
LDLC SERPL CALC-MCNC: 84 MG/DL
MICROALBUMIN UR DL<=1MG/L-MCNC: 1.9 MG/DL
MICROALBUMIN/CREAT UR: 5.6 MG/G (ref 0–30)
POTASSIUM SERPL-SCNC: 4.8 MMOL/L (ref 3.5–5.1)
PROT SERPL-MCNC: 7.5 G/DL (ref 6.4–8.2)
SODIUM SERPL-SCNC: 140 MMOL/L (ref 136–145)
TRIGL SERPL-MCNC: 72 MG/DL (ref 0–150)
VLDLC SERPL CALC-MCNC: 14 MG/DL

## 2023-04-26 PROCEDURE — 1123F ACP DISCUSS/DSCN MKR DOCD: CPT | Performed by: INTERNAL MEDICINE

## 2023-04-26 PROCEDURE — 3078F DIAST BP <80 MM HG: CPT | Performed by: INTERNAL MEDICINE

## 2023-04-26 PROCEDURE — 3075F SYST BP GE 130 - 139MM HG: CPT | Performed by: INTERNAL MEDICINE

## 2023-04-26 PROCEDURE — 99214 OFFICE O/P EST MOD 30 MIN: CPT | Performed by: INTERNAL MEDICINE

## 2023-04-26 RX ORDER — FUROSEMIDE 20 MG/1
TABLET ORAL
Qty: 90 TABLET | Refills: 1 | Status: SHIPPED | OUTPATIENT
Start: 2023-04-26

## 2023-04-26 RX ORDER — LOSARTAN POTASSIUM 25 MG/1
25 TABLET ORAL DAILY
Qty: 90 TABLET | Refills: 0 | Status: SHIPPED | OUTPATIENT
Start: 2023-04-26 | End: 2023-04-26

## 2023-04-26 SDOH — ECONOMIC STABILITY: HOUSING INSECURITY
IN THE LAST 12 MONTHS, WAS THERE A TIME WHEN YOU DID NOT HAVE A STEADY PLACE TO SLEEP OR SLEPT IN A SHELTER (INCLUDING NOW)?: NO

## 2023-04-26 SDOH — ECONOMIC STABILITY: FOOD INSECURITY: WITHIN THE PAST 12 MONTHS, YOU WORRIED THAT YOUR FOOD WOULD RUN OUT BEFORE YOU GOT MONEY TO BUY MORE.: NEVER TRUE

## 2023-04-26 SDOH — ECONOMIC STABILITY: INCOME INSECURITY: HOW HARD IS IT FOR YOU TO PAY FOR THE VERY BASICS LIKE FOOD, HOUSING, MEDICAL CARE, AND HEATING?: NOT HARD AT ALL

## 2023-04-26 SDOH — ECONOMIC STABILITY: FOOD INSECURITY: WITHIN THE PAST 12 MONTHS, THE FOOD YOU BOUGHT JUST DIDN'T LAST AND YOU DIDN'T HAVE MONEY TO GET MORE.: NEVER TRUE

## 2023-04-26 ASSESSMENT — PATIENT HEALTH QUESTIONNAIRE - PHQ9
SUM OF ALL RESPONSES TO PHQ QUESTIONS 1-9: 0
SUM OF ALL RESPONSES TO PHQ QUESTIONS 1-9: 0
2. FEELING DOWN, DEPRESSED OR HOPELESS: 0
SUM OF ALL RESPONSES TO PHQ QUESTIONS 1-9: 0
SUM OF ALL RESPONSES TO PHQ9 QUESTIONS 1 & 2: 0
1. LITTLE INTEREST OR PLEASURE IN DOING THINGS: 0
SUM OF ALL RESPONSES TO PHQ QUESTIONS 1-9: 0

## 2023-04-26 NOTE — PATIENT INSTRUCTIONS
STOP  THE AMLODIPINE BECAUSE OF LEG SWELLING  ( CAN BE WORSE WITH THIS BP MED)  CONTINUE TAKING LOSARTAN 50MG  CHECK BP AT HOME  IF SYSTOLIC BP  >502/[id number] > 85 THEN TAKE 2 LOSARTAN DAILY = 100MG  COME BACK Monday OR Tuesday NEXT WEEK FOR BP CK

## 2023-04-26 NOTE — PROGRESS NOTES
Tristen Dixon (:  1950) is a 74 y.o. female, here for evaluation of the following chief complaint(s):  Medication Check (Patient is here for a medication follow up )    Tiny Brunner was seen today for medication check. Diagnoses and all orders for this visit:    Essential hypertension  -     Comprehensive Metabolic Panel; Future    Pustular psoriasis  -     Amb External Referral To Dermatology    Centrilobular emphysema (HCC)    Right leg swelling  -     furosemide (LASIX) 20 MG tablet; TAKE 1 TABLET BY MOUTH EVERY DAY OR EVERY OTHER DAY AS NEEDED  -     Amb External Referral To Dermatology    Screening for lipid disorders  -     Lipid Panel; Future    Prediabetes  -     Hemoglobin A1C; Future    Microalbuminuria  -     Microalbumin / Creatinine Urine Ratio; Future    Other orders  -     Discontinue: losartan (COZAAR) 25 MG tablet; Take 1 tablet by mouth daily     STOP AMLODIPINE DUE TO LEG  SWELLING  RIGHT LEG HAS BEEN CHRONICALLY SWOLLEN  TRY COMPRESSION HOSE  SEE DERM  CK BP AT HOME AND DOUBLE THE LOSARTAN IF NEEDED. DO LABS TODAY    Patient Instructions   STOP  THE AMLODIPINE BECAUSE OF LEG SWELLING  ( CAN BE WORSE WITH THIS BP MED)  CONTINUE TAKING LOSARTAN 50MG  CHECK BP AT HOME  IF SYSTOLIC BP  >688/[id number] > 85 THEN TAKE 2 LOSARTAN DAILY = 100MG  COME BACK Monday OR Tuesday NEXT WEEK FOR BP CK          Subjective   SUBJECTIVE/OBJECTIVE:  HPI  Breathing is good  Occasionally uses rescue inhaler.     Htn  On amlodipine  10 mg       Bp is good  Needs cholesterol    Reviewed chart from  2018  Was rx lisinopril  Not clear if she every took it  Has swelling chronically in the right leg    HAS PUSTULAR PSORIASIS  HER OLD DERM IS TOO FAR AWAY      Lab Results   Component Value Date    LABA1C 5.3 2022    LABA1C 5.4 2022    LABA1C 5.3 10/01/2021    LABMICR <1.20 2022    LABMICR 7.50 (H) 2021    LABMICR YES 2020       Lab Results   Component Value Date     2022

## 2023-04-27 LAB
EST. AVERAGE GLUCOSE BLD GHB EST-MCNC: 105.4 MG/DL
HBA1C MFR BLD: 5.3 %

## 2023-05-02 ENCOUNTER — NURSE ONLY (OUTPATIENT)
Dept: FAMILY MEDICINE CLINIC | Age: 73
End: 2023-05-02

## 2023-05-02 VITALS — DIASTOLIC BLOOD PRESSURE: 78 MMHG | SYSTOLIC BLOOD PRESSURE: 160 MMHG

## 2023-05-04 DIAGNOSIS — I10 ESSENTIAL HYPERTENSION: Primary | ICD-10-CM

## 2023-05-04 RX ORDER — LOSARTAN POTASSIUM 50 MG/1
TABLET ORAL
Qty: 90 TABLET | Refills: 1
Start: 2023-05-04

## 2023-05-04 NOTE — PROGRESS NOTES
Pls ask pt to take  two losartan   50 mg pills daily if she is not already doing that.   Follow up 7-10 days for bp ck and will repeat a renal since increasing the losartan dose  to  100mg

## 2023-05-23 ENCOUNTER — NURSE ONLY (OUTPATIENT)
Dept: FAMILY MEDICINE CLINIC | Age: 73
End: 2023-05-23

## 2023-05-23 VITALS — DIASTOLIC BLOOD PRESSURE: 80 MMHG | SYSTOLIC BLOOD PRESSURE: 158 MMHG

## 2023-05-23 RX ORDER — HYDROCHLOROTHIAZIDE 12.5 MG/1
12.5 CAPSULE, GELATIN COATED ORAL EVERY MORNING
Qty: 90 CAPSULE | Refills: 1 | Status: SHIPPED | OUTPATIENT
Start: 2023-05-23

## 2023-05-23 RX ORDER — LOSARTAN POTASSIUM 100 MG/1
100 TABLET ORAL DAILY
Qty: 90 TABLET | Refills: 1 | Status: SHIPPED | OUTPATIENT
Start: 2023-05-23 | End: 2023-06-02

## 2023-05-23 NOTE — PROGRESS NOTES
Pls tell Jeannette I want to add a diuretic .  Will send in rx for the  100mg pill of losartan and another pill for hctz  Will see her back June 1

## 2023-06-01 ENCOUNTER — OFFICE VISIT (OUTPATIENT)
Dept: FAMILY MEDICINE CLINIC | Age: 73
End: 2023-06-01
Payer: MEDICARE

## 2023-06-01 VITALS
DIASTOLIC BLOOD PRESSURE: 78 MMHG | WEIGHT: 148 LBS | BODY MASS INDEX: 29.84 KG/M2 | SYSTOLIC BLOOD PRESSURE: 148 MMHG | RESPIRATION RATE: 16 BRPM | OXYGEN SATURATION: 95 % | HEIGHT: 59 IN | HEART RATE: 97 BPM

## 2023-06-01 DIAGNOSIS — I10 ESSENTIAL HYPERTENSION: Primary | ICD-10-CM

## 2023-06-01 DIAGNOSIS — I10 ESSENTIAL HYPERTENSION: ICD-10-CM

## 2023-06-01 LAB
ALBUMIN SERPL-MCNC: 4.6 G/DL (ref 3.4–5)
ANION GAP SERPL CALCULATED.3IONS-SCNC: 14 MMOL/L (ref 3–16)
BUN SERPL-MCNC: 22 MG/DL (ref 7–20)
CALCIUM SERPL-MCNC: 9.6 MG/DL (ref 8.3–10.6)
CHLORIDE SERPL-SCNC: 99 MMOL/L (ref 99–110)
CO2 SERPL-SCNC: 26 MMOL/L (ref 21–32)
CREAT SERPL-MCNC: 1.4 MG/DL (ref 0.6–1.2)
GFR SERPLBLD CREATININE-BSD FMLA CKD-EPI: 39 ML/MIN/{1.73_M2}
GLUCOSE SERPL-MCNC: 104 MG/DL (ref 70–99)
PHOSPHATE SERPL-MCNC: 3.6 MG/DL (ref 2.5–4.9)
POTASSIUM SERPL-SCNC: 4.1 MMOL/L (ref 3.5–5.1)
SODIUM SERPL-SCNC: 139 MMOL/L (ref 136–145)

## 2023-06-01 PROCEDURE — 1123F ACP DISCUSS/DSCN MKR DOCD: CPT | Performed by: INTERNAL MEDICINE

## 2023-06-01 PROCEDURE — 3077F SYST BP >= 140 MM HG: CPT | Performed by: INTERNAL MEDICINE

## 2023-06-01 PROCEDURE — 99213 OFFICE O/P EST LOW 20 MIN: CPT | Performed by: INTERNAL MEDICINE

## 2023-06-01 PROCEDURE — 3078F DIAST BP <80 MM HG: CPT | Performed by: INTERNAL MEDICINE

## 2023-06-01 RX ORDER — ALBUTEROL SULFATE 90 UG/1
2 AEROSOL, METERED RESPIRATORY (INHALATION) EVERY 6 HOURS PRN
Qty: 1 EACH | Refills: 1 | OUTPATIENT
Start: 2023-06-01

## 2023-06-02 DIAGNOSIS — I10 ESSENTIAL HYPERTENSION: Primary | ICD-10-CM

## 2023-06-02 RX ORDER — DILTIAZEM HYDROCHLORIDE 120 MG/1
120 CAPSULE, COATED, EXTENDED RELEASE ORAL DAILY
Qty: 90 CAPSULE | Refills: 0 | Status: SHIPPED | OUTPATIENT
Start: 2023-06-02 | End: 2023-07-10

## 2023-07-10 ENCOUNTER — OFFICE VISIT (OUTPATIENT)
Dept: FAMILY MEDICINE CLINIC | Age: 73
End: 2023-07-10
Payer: MEDICARE

## 2023-07-10 ENCOUNTER — TELEPHONE (OUTPATIENT)
Dept: FAMILY MEDICINE CLINIC | Age: 73
End: 2023-07-10

## 2023-07-10 VITALS
SYSTOLIC BLOOD PRESSURE: 148 MMHG | HEART RATE: 75 BPM | OXYGEN SATURATION: 98 % | RESPIRATION RATE: 16 BRPM | DIASTOLIC BLOOD PRESSURE: 78 MMHG | WEIGHT: 148 LBS | HEIGHT: 59 IN | BODY MASS INDEX: 29.84 KG/M2

## 2023-07-10 DIAGNOSIS — I10 ESSENTIAL HYPERTENSION: Primary | ICD-10-CM

## 2023-07-10 DIAGNOSIS — L40.1 PUSTULAR PSORIASIS: ICD-10-CM

## 2023-07-10 DIAGNOSIS — I10 ESSENTIAL HYPERTENSION: ICD-10-CM

## 2023-07-10 DIAGNOSIS — R94.4 DECREASED GFR: ICD-10-CM

## 2023-07-10 LAB
ALBUMIN SERPL-MCNC: 4.7 G/DL (ref 3.4–5)
ANION GAP SERPL CALCULATED.3IONS-SCNC: 15 MMOL/L (ref 3–16)
BUN SERPL-MCNC: 15 MG/DL (ref 7–20)
CALCIUM SERPL-MCNC: 10 MG/DL (ref 8.3–10.6)
CHLORIDE SERPL-SCNC: 101 MMOL/L (ref 99–110)
CO2 SERPL-SCNC: 28 MMOL/L (ref 21–32)
CREAT SERPL-MCNC: 1.1 MG/DL (ref 0.6–1.2)
GFR SERPLBLD CREATININE-BSD FMLA CKD-EPI: 52 ML/MIN/{1.73_M2}
GLUCOSE SERPL-MCNC: 100 MG/DL (ref 70–99)
PHOSPHATE SERPL-MCNC: 3.6 MG/DL (ref 2.5–4.9)
POTASSIUM SERPL-SCNC: 4.4 MMOL/L (ref 3.5–5.1)
SODIUM SERPL-SCNC: 144 MMOL/L (ref 136–145)

## 2023-07-10 PROCEDURE — 3078F DIAST BP <80 MM HG: CPT | Performed by: INTERNAL MEDICINE

## 2023-07-10 PROCEDURE — 1123F ACP DISCUSS/DSCN MKR DOCD: CPT | Performed by: INTERNAL MEDICINE

## 2023-07-10 PROCEDURE — 3077F SYST BP >= 140 MM HG: CPT | Performed by: INTERNAL MEDICINE

## 2023-07-10 PROCEDURE — 99213 OFFICE O/P EST LOW 20 MIN: CPT | Performed by: INTERNAL MEDICINE

## 2023-07-10 RX ORDER — DILTIAZEM HYDROCHLORIDE 120 MG/1
CAPSULE, COATED, EXTENDED RELEASE ORAL
Qty: 90 CAPSULE | Refills: 0 | Status: SHIPPED | OUTPATIENT
Start: 2023-07-10

## 2023-07-10 RX ORDER — DILTIAZEM HYDROCHLORIDE 120 MG/1
CAPSULE, COATED, EXTENDED RELEASE ORAL
Qty: 180 CAPSULE | OUTPATIENT
Start: 2023-07-10

## 2023-07-10 NOTE — PROGRESS NOTES
Flakita Hernandez (:  1950) is a 74 y.o. female, here for evaluation of the following chief complaint(s):  Hypertension (Patient is here for a BP check )    Jose Canales was seen today for hypertension. Diagnoses and all orders for this visit:    Essential hypertension  -     Renal Function Panel; Future    Decreased GFR  -     Renal Function Panel; Future    Pustular psoriasis, Dr. Kimberly Damon  -     Elly External Referral To Dermatology    Other orders  -     dilTIAZem (CARDIZEM CD) 120 MG extended release capsule; Take two capsules daily     Double the cardizem  Re ck labs for renal function off the losartan  May need to stop the hctz depending on renal function  Still on lasix once a day  Psoriasis is significant in the lower ext  Needs new derm closer. Follow up about 3 week MA ck bp on higher dose of cardizem  Follow up with me 3 months unless bp running high          Subjective   SUBJECTIVE/OBJECTIVE:  HPI  Htn  On hctz 12.5 /day and  losartan   was inc from  50-100mg. Some confusion about the med  She is not sure if she is taking the losartan.      Hemoglobin A1C (%)   Date Value   2023 5.3   2022 5.3   2022 5.4       Sodium (mmol/L)   Date Value   2023 139   2023 140   2022 141     Potassium (mmol/L)   Date Value   2023 4.1   2023 4.8   2022 4.7     Potassium reflex Magnesium (mmol/L)   Date Value   2020 4.2   2020 4.1     Chloride (mmol/L)   Date Value   2023 99   2023 102   2022 103     CO2 (mmol/L)   Date Value   2023 26   2023 26   2022 25     BUN (mg/dL)   Date Value   2023 22 (H)   2023 13   2022 24 (H)     Creatinine (mg/dL)   Date Value   2023 1.4 (H)   2023 1.1   2022 0.9     Glucose (mg/dL)   Date Value   2023 104 (H)   2023 94   2022 95     Calcium (mg/dL)   Date Value   2023 9.6   2023 9.5   2022 9.6       Cholesterol, Total

## 2023-07-10 NOTE — TELEPHONE ENCOUNTER
Patient is calling in to tell Dr. Papo Eduardo that the medication she is taking is as follows:    DILTIAZEM 120 MG  HYDROCHILOROTHIAZIDE 120 MG    FYI

## 2023-09-01 RX ORDER — DILTIAZEM HYDROCHLORIDE 120 MG/1
CAPSULE, COATED, EXTENDED RELEASE ORAL
Qty: 90 CAPSULE | Refills: 1 | Status: SHIPPED | OUTPATIENT
Start: 2023-09-01 | End: 2023-10-10

## 2023-10-10 ENCOUNTER — OFFICE VISIT (OUTPATIENT)
Dept: FAMILY MEDICINE CLINIC | Age: 73
End: 2023-10-10
Payer: MEDICARE

## 2023-10-10 VITALS
HEIGHT: 59 IN | OXYGEN SATURATION: 95 % | SYSTOLIC BLOOD PRESSURE: 158 MMHG | RESPIRATION RATE: 16 BRPM | HEART RATE: 82 BPM | DIASTOLIC BLOOD PRESSURE: 80 MMHG | WEIGHT: 158 LBS | BODY MASS INDEX: 31.85 KG/M2

## 2023-10-10 DIAGNOSIS — Z23 NEED FOR INFLUENZA VACCINATION: ICD-10-CM

## 2023-10-10 DIAGNOSIS — R60.0 EDEMA OF RIGHT LOWER EXTREMITY: Primary | ICD-10-CM

## 2023-10-10 DIAGNOSIS — J43.2 CENTRILOBULAR EMPHYSEMA (HCC): ICD-10-CM

## 2023-10-10 DIAGNOSIS — R60.0 EDEMA OF RIGHT LOWER EXTREMITY: ICD-10-CM

## 2023-10-10 DIAGNOSIS — I10 ESSENTIAL HYPERTENSION: ICD-10-CM

## 2023-10-10 LAB
ALBUMIN SERPL-MCNC: 4.5 G/DL (ref 3.4–5)
ANION GAP SERPL CALCULATED.3IONS-SCNC: 11 MMOL/L (ref 3–16)
BASOPHILS # BLD: 0 K/UL (ref 0–0.2)
BASOPHILS NFR BLD: 0.4 %
BUN SERPL-MCNC: 12 MG/DL (ref 7–20)
CALCIUM SERPL-MCNC: 9.2 MG/DL (ref 8.3–10.6)
CHLORIDE SERPL-SCNC: 105 MMOL/L (ref 99–110)
CO2 SERPL-SCNC: 29 MMOL/L (ref 21–32)
CREAT SERPL-MCNC: 1 MG/DL (ref 0.6–1.2)
DEPRECATED RDW RBC AUTO: 14.2 % (ref 12.4–15.4)
EOSINOPHIL # BLD: 0.2 K/UL (ref 0–0.6)
EOSINOPHIL NFR BLD: 2.1 %
GFR SERPLBLD CREATININE-BSD FMLA CKD-EPI: 59 ML/MIN/{1.73_M2}
GLUCOSE SERPL-MCNC: 96 MG/DL (ref 70–99)
HCT VFR BLD AUTO: 38.1 % (ref 36–48)
HGB BLD-MCNC: 12.8 G/DL (ref 12–16)
LYMPHOCYTES # BLD: 2.2 K/UL (ref 1–5.1)
LYMPHOCYTES NFR BLD: 22.5 %
MCH RBC QN AUTO: 29.2 PG (ref 26–34)
MCHC RBC AUTO-ENTMCNC: 33.6 G/DL (ref 31–36)
MCV RBC AUTO: 87.1 FL (ref 80–100)
MONOCYTES # BLD: 0.7 K/UL (ref 0–1.3)
MONOCYTES NFR BLD: 6.8 %
NEUTROPHILS # BLD: 6.6 K/UL (ref 1.7–7.7)
NEUTROPHILS NFR BLD: 68.2 %
PHOSPHATE SERPL-MCNC: 3.1 MG/DL (ref 2.5–4.9)
PLATELET # BLD AUTO: 190 K/UL (ref 135–450)
PMV BLD AUTO: 10.4 FL (ref 5–10.5)
POTASSIUM SERPL-SCNC: 4 MMOL/L (ref 3.5–5.1)
RBC # BLD AUTO: 4.37 M/UL (ref 4–5.2)
SODIUM SERPL-SCNC: 145 MMOL/L (ref 136–145)
WBC # BLD AUTO: 9.6 K/UL (ref 4–11)

## 2023-10-10 PROCEDURE — 3077F SYST BP >= 140 MM HG: CPT | Performed by: INTERNAL MEDICINE

## 2023-10-10 PROCEDURE — G0008 ADMIN INFLUENZA VIRUS VAC: HCPCS | Performed by: INTERNAL MEDICINE

## 2023-10-10 PROCEDURE — 1123F ACP DISCUSS/DSCN MKR DOCD: CPT | Performed by: INTERNAL MEDICINE

## 2023-10-10 PROCEDURE — 90694 VACC AIIV4 NO PRSRV 0.5ML IM: CPT | Performed by: INTERNAL MEDICINE

## 2023-10-10 PROCEDURE — 3079F DIAST BP 80-89 MM HG: CPT | Performed by: INTERNAL MEDICINE

## 2023-10-10 PROCEDURE — 99214 OFFICE O/P EST MOD 30 MIN: CPT | Performed by: INTERNAL MEDICINE

## 2023-10-10 RX ORDER — DILTIAZEM HYDROCHLORIDE 120 MG/1
CAPSULE, COATED, EXTENDED RELEASE ORAL
Qty: 30 CAPSULE | Refills: 0
Start: 2023-10-10

## 2023-10-10 RX ORDER — HYDROCHLOROTHIAZIDE 12.5 MG/1
12.5 CAPSULE, GELATIN COATED ORAL EVERY MORNING
Qty: 90 CAPSULE | Refills: 1 | Status: SHIPPED | OUTPATIENT
Start: 2023-10-10

## 2023-10-10 RX ORDER — IPRATROPIUM BROMIDE AND ALBUTEROL SULFATE 2.5; .5 MG/3ML; MG/3ML
1 SOLUTION RESPIRATORY (INHALATION) EVERY 4 HOURS PRN
Qty: 50 ML | Refills: 0 | Status: SHIPPED | OUTPATIENT
Start: 2023-10-10

## 2023-10-10 NOTE — PROGRESS NOTES
06/01/2023 99   04/26/2023 102     CO2 (mmol/L)   Date Value   07/10/2023 28   06/01/2023 26   04/26/2023 26     BUN (mg/dL)   Date Value   07/10/2023 15   06/01/2023 22 (H)   04/26/2023 13     Creatinine (mg/dL)   Date Value   07/10/2023 1.1   06/01/2023 1.4 (H)   04/26/2023 1.1     Glucose (mg/dL)   Date Value   07/10/2023 100 (H)   06/01/2023 104 (H)   04/26/2023 94     Calcium (mg/dL)   Date Value   07/10/2023 10.0   06/01/2023 9.6   04/26/2023 9.5       Cholesterol, Total (mg/dL)   Date Value   04/26/2023 182   10/01/2021 164   02/02/2021 163     Triglycerides (mg/dL)   Date Value   04/26/2023 72   10/01/2021 73   02/02/2021 96     HDL (mg/dL)   Date Value   04/26/2023 84 (H)   10/01/2021 72 (H)   02/02/2021 74 (H)     LDL Calculated (mg/dL)   Date Value   04/26/2023 84   10/01/2021 77   02/02/2021 70       ALT (U/L)   Date Value   04/26/2023 17   12/28/2022 19   09/26/2022 14     AST (U/L)   Date Value   04/26/2023 19   12/28/2022 19   09/26/2022 14 (L)       No results found for: \"TSH\", \"T4FREE\", \"T3FREE\"    WBC (K/uL)   Date Value   04/26/2022 8.6   10/01/2021 9.4   09/08/2021 11.4 (H)     Hemoglobin (g/dL)   Date Value   04/26/2022 13.4   10/01/2021 13.1   09/08/2021 13.8     Hematocrit (%)   Date Value   04/26/2022 41.4   10/01/2021 41.0   09/08/2021 42.7     MCV (fL)   Date Value   04/26/2022 85.3   10/01/2021 87.0   09/08/2021 86.6     Platelets (K/uL)   Date Value   04/26/2022 255   10/01/2021 255   09/08/2021 256       No results found for: \"PSA\"     No results found for: \"LABURIC\"     Vitals:    10/10/23 1151   BP: (!) 158/80   Pulse: 82   Resp: 16   SpO2: 95%       BP Readings from Last 3 Encounters:   10/10/23 (!) 158/80   07/10/23 (!) 148/78   06/01/23 (!) 148/78        Wt Readings from Last 3 Encounters:   10/10/23 158 lb (71.7 kg)   07/10/23 148 lb (67.1 kg)   06/01/23 148 lb (67.1 kg)        Review of Systems       Objective   Physical Exam  Constitutional:       Appearance: Normal appearance.

## 2023-10-11 ENCOUNTER — HOSPITAL ENCOUNTER (OUTPATIENT)
Dept: VASCULAR LAB | Age: 73
Discharge: HOME | End: 2023-10-11
Attending: INTERNAL MEDICINE
Payer: MEDICARE

## 2023-10-11 PROCEDURE — 93971 EXTREMITY STUDY: CPT

## 2023-10-31 ENCOUNTER — OFFICE VISIT (OUTPATIENT)
Dept: FAMILY MEDICINE CLINIC | Age: 73
End: 2023-10-31
Payer: MEDICARE

## 2023-10-31 VITALS
WEIGHT: 156 LBS | BODY MASS INDEX: 31.45 KG/M2 | RESPIRATION RATE: 16 BRPM | HEART RATE: 81 BPM | DIASTOLIC BLOOD PRESSURE: 70 MMHG | HEIGHT: 59 IN | OXYGEN SATURATION: 94 % | SYSTOLIC BLOOD PRESSURE: 140 MMHG

## 2023-10-31 DIAGNOSIS — I10 ESSENTIAL HYPERTENSION: Primary | ICD-10-CM

## 2023-10-31 DIAGNOSIS — R80.9 MICROALBUMINURIA: ICD-10-CM

## 2023-10-31 DIAGNOSIS — N18.31 STAGE 3A CHRONIC KIDNEY DISEASE (HCC): ICD-10-CM

## 2023-10-31 DIAGNOSIS — L40.1 PUSTULAR PSORIASIS: ICD-10-CM

## 2023-10-31 DIAGNOSIS — J43.2 CENTRILOBULAR EMPHYSEMA (HCC): ICD-10-CM

## 2023-10-31 PROCEDURE — 99214 OFFICE O/P EST MOD 30 MIN: CPT | Performed by: INTERNAL MEDICINE

## 2023-10-31 PROCEDURE — 1123F ACP DISCUSS/DSCN MKR DOCD: CPT | Performed by: INTERNAL MEDICINE

## 2023-10-31 PROCEDURE — 3078F DIAST BP <80 MM HG: CPT | Performed by: INTERNAL MEDICINE

## 2023-10-31 PROCEDURE — 3077F SYST BP >= 140 MM HG: CPT | Performed by: INTERNAL MEDICINE

## 2023-10-31 RX ORDER — DILTIAZEM HYDROCHLORIDE 120 MG/1
CAPSULE, COATED, EXTENDED RELEASE ORAL
Qty: 30 CAPSULE | Refills: 0
Start: 2023-10-31

## 2023-10-31 RX ORDER — LISINOPRIL 10 MG/1
10 TABLET ORAL DAILY
Qty: 90 TABLET | OUTPATIENT
Start: 2023-10-31

## 2023-10-31 RX ORDER — ALBUTEROL SULFATE 90 UG/1
2 AEROSOL, METERED RESPIRATORY (INHALATION) EVERY 6 HOURS PRN
Qty: 1 EACH | Refills: 0 | Status: SHIPPED | OUTPATIENT
Start: 2023-10-31

## 2023-10-31 RX ORDER — DILTIAZEM HYDROCHLORIDE 360 MG/1
360 CAPSULE, EXTENDED RELEASE ORAL DAILY
Qty: 90 CAPSULE | Refills: 1 | Status: SHIPPED | OUTPATIENT
Start: 2023-10-31 | End: 2023-10-31

## 2023-10-31 RX ORDER — FLUTICASONE FUROATE, UMECLIDINIUM BROMIDE AND VILANTEROL TRIFENATATE 200; 62.5; 25 UG/1; UG/1; UG/1
1 POWDER RESPIRATORY (INHALATION) DAILY
Qty: 2 EACH | Refills: 0 | Status: SHIPPED | COMMUNITY
Start: 2023-10-31

## 2023-10-31 RX ORDER — LISINOPRIL 10 MG/1
10 TABLET ORAL DAILY
Qty: 30 TABLET | Refills: 1 | Status: SHIPPED | OUTPATIENT
Start: 2023-10-31

## 2023-10-31 NOTE — PROGRESS NOTES
Kellie Campbell (:  1950) is a 74 y.o. female, here for evaluation of the following chief complaint(s):  Edema and Hypertension (Patient is here for a 2 week BP and Leg swelling check )    Lydia Cai was seen today for edema and hypertension. Diagnoses and all orders for this visit:    Essential hypertension  -     Discontinue: dilTIAZem (CARDIZEM CD) 360 MG extended release capsule; Take 1 capsule by mouth daily  -     dilTIAZem (CARDIZEM CD) 120 MG extended release capsule; Take 2  ( 120 mg) =  240mg /day  -     Renal Function Panel; Future    Centrilobular emphysema (HCC)  -     albuterol sulfate HFA (VENTOLIN HFA) 108 (90 Base) MCG/ACT inhaler; Inhale 2 puffs into the lungs every 6 hours as needed for Wheezing or Shortness of Breath Indications: Ventolin per PT    Pustular psoriasis, Dr. Sandrita Mcbride    Stage 3a chronic kidney disease [facility])  Comments:  off losartan due to worsening kidney funtion  ~,  restart lisinopril  10mg 10/31/23    Orders:  -     lisinopril (PRINIVIL;ZESTRIL) 10 MG tablet; Take 1 tablet by mouth daily  -     Renal Function Panel; Future    Microalbuminuria  -     lisinopril (PRINIVIL;ZESTRIL) 10 MG tablet; Take 1 tablet by mouth daily    Other orders  -     fluticasone-umeclidin-vilant (TRELEGY ELLIPTA) 200-62.5-25 MCG/ACT AEPB inhaler; Inhale 1 puff into the lungs daily       Will restart lisinopril , stopped in . She had some microalbuminuria   Losartan was stopped due to worsening kidney function. So will ck renal in about a week after starting the lisinopril  For bp take  2   diltiazem  141=818  mg /day  Trelegy given in case she needs it  She can't afford it.   See me in one month         Subjective   SUBJECTIVE/OBJECTIVE:  Edema    Hypertension      HTN    Bp is better but still a little high  Wrist bp cuff  135 136   Taking  3 120 cardizem to equal  360  Taking 12.5 hctz    She is very clear about the dose of the cardizem   Legs less swollen   On hctz  Reviewed old

## 2023-11-24 DIAGNOSIS — I10 ESSENTIAL HYPERTENSION: ICD-10-CM

## 2023-11-24 DIAGNOSIS — J43.2 CENTRILOBULAR EMPHYSEMA (HCC): ICD-10-CM

## 2023-11-24 RX ORDER — LOSARTAN POTASSIUM 100 MG/1
100 TABLET ORAL DAILY
Qty: 90 TABLET | Refills: 1 | OUTPATIENT
Start: 2023-11-24

## 2023-11-24 RX ORDER — ALBUTEROL SULFATE 90 UG/1
2 AEROSOL, METERED RESPIRATORY (INHALATION) EVERY 6 HOURS PRN
Qty: 18 G | OUTPATIENT
Start: 2023-11-24

## 2023-11-24 RX ORDER — ALBUTEROL SULFATE 90 UG/1
2 AEROSOL, METERED RESPIRATORY (INHALATION) EVERY 6 HOURS PRN
Qty: 18 G | Refills: 0 | Status: SHIPPED | OUTPATIENT
Start: 2023-11-24

## 2023-11-24 RX ORDER — LOSARTAN POTASSIUM 100 MG/1
100 TABLET ORAL DAILY
Qty: 90 TABLET | Refills: 1 | Status: SHIPPED | OUTPATIENT
Start: 2023-11-24

## 2023-11-24 RX ORDER — HYDROCHLOROTHIAZIDE 12.5 MG/1
12.5 CAPSULE, GELATIN COATED ORAL EVERY MORNING
Qty: 90 CAPSULE | Refills: 1 | OUTPATIENT
Start: 2023-11-24

## 2023-11-28 ENCOUNTER — TELEPHONE (OUTPATIENT)
Dept: FAMILY MEDICINE CLINIC | Age: 73
End: 2023-11-28

## 2023-11-28 DIAGNOSIS — N18.31 STAGE 3A CHRONIC KIDNEY DISEASE (HCC): ICD-10-CM

## 2023-11-28 DIAGNOSIS — R80.9 MICROALBUMINURIA: ICD-10-CM

## 2023-11-28 DIAGNOSIS — I10 ESSENTIAL HYPERTENSION: ICD-10-CM

## 2023-11-28 LAB
ALBUMIN SERPL-MCNC: 4.2 G/DL (ref 3.4–5)
ANION GAP SERPL CALCULATED.3IONS-SCNC: 14 MMOL/L (ref 3–16)
BUN SERPL-MCNC: 17 MG/DL (ref 7–20)
CALCIUM SERPL-MCNC: 9.6 MG/DL (ref 8.3–10.6)
CHLORIDE SERPL-SCNC: 104 MMOL/L (ref 99–110)
CO2 SERPL-SCNC: 27 MMOL/L (ref 21–32)
CREAT SERPL-MCNC: 1.1 MG/DL (ref 0.6–1.2)
GFR SERPLBLD CREATININE-BSD FMLA CKD-EPI: 52 ML/MIN/{1.73_M2}
GLUCOSE SERPL-MCNC: 104 MG/DL (ref 70–99)
PHOSPHATE SERPL-MCNC: 2.4 MG/DL (ref 2.5–4.9)
POTASSIUM SERPL-SCNC: 4.6 MMOL/L (ref 3.5–5.1)
SODIUM SERPL-SCNC: 145 MMOL/L (ref 136–145)

## 2023-11-28 RX ORDER — LISINOPRIL 10 MG/1
10 TABLET ORAL DAILY
Qty: 90 TABLET | Refills: 0 | Status: SHIPPED | OUTPATIENT
Start: 2023-11-28 | End: 2023-11-29

## 2023-11-28 NOTE — TELEPHONE ENCOUNTER
Pt seen today for bp check  BP was 158/80 and 160/78  Brought in meds as there was some confusion  She was supposed to be taking two 120 mg diltiazem (240 mg), lisinopril 10 mg and hctz 12.5 mg.  Pt thought she was to dc her Diltiazem when she started lisinopril so has not taken any Diltiazem over the past month. She did resume Dilt today (120 mg) as she took her last Lisinopril yesterday. I advised her that she should be taking the Dilt along with the Lisinopril and to take another Diltiazem as her dose is 240 mg. She reports that she has been taking the hctz daily. Losartan was refilled in error on 11/24. This was picked up but she knew not to take. I discarded the Losartan to avoid any further confusion. Needs refill on Lisinopril and this is pending.  Has appt tmrw

## 2023-11-29 ENCOUNTER — OFFICE VISIT (OUTPATIENT)
Dept: FAMILY MEDICINE CLINIC | Age: 73
End: 2023-11-29
Payer: MEDICARE

## 2023-11-29 ENCOUNTER — TELEPHONE (OUTPATIENT)
Dept: FAMILY MEDICINE CLINIC | Age: 73
End: 2023-11-29

## 2023-11-29 VITALS
HEIGHT: 59 IN | TEMPERATURE: 97.9 F | WEIGHT: 154.4 LBS | BODY MASS INDEX: 31.12 KG/M2 | DIASTOLIC BLOOD PRESSURE: 72 MMHG | HEART RATE: 72 BPM | RESPIRATION RATE: 18 BRPM | SYSTOLIC BLOOD PRESSURE: 146 MMHG | OXYGEN SATURATION: 98 %

## 2023-11-29 DIAGNOSIS — I10 ESSENTIAL HYPERTENSION: Primary | ICD-10-CM

## 2023-11-29 PROCEDURE — 99213 OFFICE O/P EST LOW 20 MIN: CPT | Performed by: INTERNAL MEDICINE

## 2023-11-29 PROCEDURE — 1123F ACP DISCUSS/DSCN MKR DOCD: CPT | Performed by: INTERNAL MEDICINE

## 2023-11-29 PROCEDURE — 3077F SYST BP >= 140 MM HG: CPT | Performed by: INTERNAL MEDICINE

## 2023-11-29 PROCEDURE — 3078F DIAST BP <80 MM HG: CPT | Performed by: INTERNAL MEDICINE

## 2023-11-29 RX ORDER — LISINOPRIL AND HYDROCHLOROTHIAZIDE 12.5; 1 MG/1; MG/1
1 TABLET ORAL DAILY
Qty: 90 TABLET | Refills: 1 | Status: SHIPPED | OUTPATIENT
Start: 2023-11-29

## 2023-11-29 NOTE — PATIENT INSTRUCTIONS
Continue taking  diltiazem  120mg x2 pills daily = 240 mg/day      When you run out  of diltiazem  call out office. I will refill one pill daily of  240mg    Today new rx for combination pill of lisinopril and the HCTZ (hydrochlorothiazide)    Bring in the bottles of meds for bp next week

## 2023-11-29 NOTE — PROGRESS NOTES
Wenda Hamman (:  1950) is a 74 y.o. female, here for evaluation of the following chief complaint(s):  Follow-up and Hypertension (Doing good./No new concerns.)           Assessment/Plan  Kendrick Qiu was seen today for follow-up and hypertension. Diagnoses and all orders for this visit:    Essential hypertension    Other orders  -     lisinopril-hydroCHLOROthiazide (PRINZIDE;ZESTORETIC) 10-12.5 MG per tablet; Take 1 tablet by mouth daily         Orders Placed This Encounter   Medications    lisinopril-hydroCHLOROthiazide (PRINZIDE;ZESTORETIC) 10-12.5 MG per tablet     Sig: Take 1 tablet by mouth daily     Dispense:  90 tablet     Refill:  1     Cancel the lisinopril 10mg that was sent yesterday , cancel her rx for hctz 12.5 mg( pls cancel in the system)  Give her the combination pill        Return in about 1 month (around 2023), or bp check. Patient Instructions   Continue taking  diltiazem  120mg x2 pills daily = 240 mg/day      When you run out  of diltiazem  call out office. I will refill one pill daily of  240mg    Today new rx for combination pill of lisinopril and the HCTZ (hydrochlorothiazide)    Bring in the bottles of meds for bp next week      Subjective   SUBJECTIVE/OBJECTIVE:  HPI  Bp was high yesterday for the ma  Pt went home and brought meds back  There was confusion    Htn  At home getting    140 then goes down  Unclear what it is running      Kadie's note yesterday:  She was supposed to be taking two 120 mg diltiazem (240 mg), lisinopril 10 mg and hctz 12.5 mg.  Pt thought she was to dc her Diltiazem when she started lisinopril so has not taken any Diltiazem over the past month. She did resume Dilt today (120 mg) as she took her last Lisinopril yesterday. I advised her that she should be taking the Dilt along with the Lisinopril and to take another Diltiazem as her dose is 240 mg. She reports that she has been taking the hctz daily. Losartan was refilled in error on .  This was

## 2023-11-29 NOTE — TELEPHONE ENCOUNTER
Called and lvm for the SOP to call back to let him know that  changed her BP medications to be combined into one medication which is now Lisinopril-hydrochlorothiazide. This takes the place of the two medications she was on which was: Lisinopril and Hydrocholothiazide. Dr. Mikaela Ann would also like the SOP to come in with the next follow up visit. Called and let pharmacy know of the medication change also.

## 2023-12-02 DIAGNOSIS — I10 ESSENTIAL HYPERTENSION: ICD-10-CM

## 2023-12-04 RX ORDER — DILTIAZEM HYDROCHLORIDE 120 MG/1
CAPSULE, COATED, EXTENDED RELEASE ORAL
Qty: 90 CAPSULE | OUTPATIENT
Start: 2023-12-04

## 2023-12-21 DIAGNOSIS — J43.2 CENTRILOBULAR EMPHYSEMA (HCC): ICD-10-CM

## 2023-12-21 RX ORDER — ALBUTEROL SULFATE 90 UG/1
2 AEROSOL, METERED RESPIRATORY (INHALATION) EVERY 6 HOURS PRN
Qty: 18 G | Refills: 0 | OUTPATIENT
Start: 2023-12-21

## 2024-01-02 ENCOUNTER — OFFICE VISIT (OUTPATIENT)
Dept: FAMILY MEDICINE CLINIC | Age: 74
End: 2024-01-02
Payer: COMMERCIAL

## 2024-01-02 VITALS
HEART RATE: 84 BPM | HEIGHT: 59 IN | SYSTOLIC BLOOD PRESSURE: 138 MMHG | RESPIRATION RATE: 16 BRPM | DIASTOLIC BLOOD PRESSURE: 72 MMHG | OXYGEN SATURATION: 92 % | WEIGHT: 157 LBS | BODY MASS INDEX: 31.65 KG/M2

## 2024-01-02 DIAGNOSIS — J43.2 CENTRILOBULAR EMPHYSEMA (HCC): ICD-10-CM

## 2024-01-02 DIAGNOSIS — N18.31 STAGE 3A CHRONIC KIDNEY DISEASE (HCC): ICD-10-CM

## 2024-01-02 DIAGNOSIS — L40.1 PUSTULAR PSORIASIS: ICD-10-CM

## 2024-01-02 DIAGNOSIS — I10 ESSENTIAL HYPERTENSION: Primary | ICD-10-CM

## 2024-01-02 DIAGNOSIS — I10 ESSENTIAL HYPERTENSION: ICD-10-CM

## 2024-01-02 LAB
ALBUMIN SERPL-MCNC: 4.5 G/DL (ref 3.4–5)
ANION GAP SERPL CALCULATED.3IONS-SCNC: 9 MMOL/L (ref 3–16)
BUN SERPL-MCNC: 18 MG/DL (ref 7–20)
CALCIUM SERPL-MCNC: 9.8 MG/DL (ref 8.3–10.6)
CHLORIDE SERPL-SCNC: 103 MMOL/L (ref 99–110)
CO2 SERPL-SCNC: 30 MMOL/L (ref 21–32)
CREAT SERPL-MCNC: 1.2 MG/DL (ref 0.6–1.2)
GFR SERPLBLD CREATININE-BSD FMLA CKD-EPI: 47 ML/MIN/{1.73_M2}
GLUCOSE SERPL-MCNC: 93 MG/DL (ref 70–99)
PHOSPHATE SERPL-MCNC: 3.1 MG/DL (ref 2.5–4.9)
POTASSIUM SERPL-SCNC: 5.4 MMOL/L (ref 3.5–5.1)
SODIUM SERPL-SCNC: 142 MMOL/L (ref 136–145)

## 2024-01-02 PROCEDURE — 3075F SYST BP GE 130 - 139MM HG: CPT | Performed by: INTERNAL MEDICINE

## 2024-01-02 PROCEDURE — 99214 OFFICE O/P EST MOD 30 MIN: CPT | Performed by: INTERNAL MEDICINE

## 2024-01-02 PROCEDURE — 1123F ACP DISCUSS/DSCN MKR DOCD: CPT | Performed by: INTERNAL MEDICINE

## 2024-01-02 PROCEDURE — 3078F DIAST BP <80 MM HG: CPT | Performed by: INTERNAL MEDICINE

## 2024-01-02 ASSESSMENT — PATIENT HEALTH QUESTIONNAIRE - PHQ9
SUM OF ALL RESPONSES TO PHQ QUESTIONS 1-9: 0
2. FEELING DOWN, DEPRESSED OR HOPELESS: 0
SUM OF ALL RESPONSES TO PHQ QUESTIONS 1-9: 0
SUM OF ALL RESPONSES TO PHQ9 QUESTIONS 1 & 2: 0
SUM OF ALL RESPONSES TO PHQ QUESTIONS 1-9: 0
SUM OF ALL RESPONSES TO PHQ QUESTIONS 1-9: 0
1. LITTLE INTEREST OR PLEASURE IN DOING THINGS: 0

## 2024-01-02 NOTE — PROGRESS NOTES
She is alert.   Psychiatric:         Mood and Affect: Mood normal.         Behavior: Behavior normal.         Thought Content: Thought content normal.         Judgment: Judgment normal.             YONIS FERMIN MD

## 2024-01-04 DIAGNOSIS — E87.5 HYPERKALEMIA: Primary | ICD-10-CM

## 2024-01-08 DIAGNOSIS — E87.5 HYPERKALEMIA: ICD-10-CM

## 2024-01-08 LAB
ALBUMIN SERPL-MCNC: 4.3 G/DL (ref 3.4–5)
ANION GAP SERPL CALCULATED.3IONS-SCNC: 9 MMOL/L (ref 3–16)
BUN SERPL-MCNC: 28 MG/DL (ref 7–20)
CALCIUM SERPL-MCNC: 9.5 MG/DL (ref 8.3–10.6)
CHLORIDE SERPL-SCNC: 100 MMOL/L (ref 99–110)
CO2 SERPL-SCNC: 28 MMOL/L (ref 21–32)
CREAT SERPL-MCNC: 1.1 MG/DL (ref 0.6–1.2)
GFR SERPLBLD CREATININE-BSD FMLA CKD-EPI: 52 ML/MIN/{1.73_M2}
GLUCOSE SERPL-MCNC: 94 MG/DL (ref 70–99)
PHOSPHATE SERPL-MCNC: 3.4 MG/DL (ref 2.5–4.9)
POTASSIUM SERPL-SCNC: 5.2 MMOL/L (ref 3.5–5.1)
SODIUM SERPL-SCNC: 137 MMOL/L (ref 136–145)

## 2024-01-09 DIAGNOSIS — I10 ESSENTIAL HYPERTENSION: Primary | ICD-10-CM

## 2024-01-09 DIAGNOSIS — N18.31 STAGE 3A CHRONIC KIDNEY DISEASE (HCC): ICD-10-CM

## 2024-01-19 ENCOUNTER — TELEPHONE (OUTPATIENT)
Dept: FAMILY MEDICINE CLINIC | Age: 74
End: 2024-01-19

## 2024-01-19 NOTE — TELEPHONE ENCOUNTER
Pls advise. I did let the patient know to hold her lisinopril/hctz. She is getting her labs done on Monday

## 2024-01-22 DIAGNOSIS — N18.31 STAGE 3A CHRONIC KIDNEY DISEASE (HCC): ICD-10-CM

## 2024-01-22 DIAGNOSIS — I10 ESSENTIAL HYPERTENSION: ICD-10-CM

## 2024-01-22 LAB
ALBUMIN SERPL-MCNC: 4.5 G/DL (ref 3.4–5)
ANION GAP SERPL CALCULATED.3IONS-SCNC: 11 MMOL/L (ref 3–16)
BUN SERPL-MCNC: 14 MG/DL (ref 7–20)
CALCIUM SERPL-MCNC: 9 MG/DL (ref 8.3–10.6)
CHLORIDE SERPL-SCNC: 101 MMOL/L (ref 99–110)
CO2 SERPL-SCNC: 26 MMOL/L (ref 21–32)
CREAT SERPL-MCNC: 1 MG/DL (ref 0.6–1.2)
GFR SERPLBLD CREATININE-BSD FMLA CKD-EPI: 59 ML/MIN/{1.73_M2}
GLUCOSE SERPL-MCNC: 95 MG/DL (ref 70–99)
PHOSPHATE SERPL-MCNC: 2.7 MG/DL (ref 2.5–4.9)
POTASSIUM SERPL-SCNC: 4.3 MMOL/L (ref 3.5–5.1)
SODIUM SERPL-SCNC: 138 MMOL/L (ref 136–145)

## 2024-01-24 DIAGNOSIS — I10 ESSENTIAL HYPERTENSION: Primary | ICD-10-CM

## 2024-01-24 DIAGNOSIS — N18.31 STAGE 3A CHRONIC KIDNEY DISEASE (HCC): ICD-10-CM

## 2024-01-24 RX ORDER — HYDROCHLOROTHIAZIDE 12.5 MG/1
12.5 CAPSULE, GELATIN COATED ORAL EVERY MORNING
Qty: 90 CAPSULE | Refills: 0 | Status: SHIPPED | OUTPATIENT
Start: 2024-01-24

## 2024-03-04 ENCOUNTER — OFFICE VISIT (OUTPATIENT)
Dept: FAMILY MEDICINE CLINIC | Age: 74
End: 2024-03-04
Payer: COMMERCIAL

## 2024-03-04 VITALS
WEIGHT: 157 LBS | HEART RATE: 86 BPM | RESPIRATION RATE: 16 BRPM | SYSTOLIC BLOOD PRESSURE: 146 MMHG | OXYGEN SATURATION: 94 % | DIASTOLIC BLOOD PRESSURE: 72 MMHG | BODY MASS INDEX: 31.71 KG/M2

## 2024-03-04 DIAGNOSIS — L40.1 PUSTULAR PSORIASIS: ICD-10-CM

## 2024-03-04 DIAGNOSIS — N18.31 STAGE 3A CHRONIC KIDNEY DISEASE (HCC): ICD-10-CM

## 2024-03-04 DIAGNOSIS — R60.0 EDEMA OF RIGHT LOWER EXTREMITY: ICD-10-CM

## 2024-03-04 DIAGNOSIS — Z12.83 SCREENING EXAM FOR SKIN CANCER: ICD-10-CM

## 2024-03-04 DIAGNOSIS — J43.2 CENTRILOBULAR EMPHYSEMA (HCC): ICD-10-CM

## 2024-03-04 DIAGNOSIS — I10 ESSENTIAL HYPERTENSION: Primary | ICD-10-CM

## 2024-03-04 PROCEDURE — 3077F SYST BP >= 140 MM HG: CPT | Performed by: INTERNAL MEDICINE

## 2024-03-04 PROCEDURE — 3078F DIAST BP <80 MM HG: CPT | Performed by: INTERNAL MEDICINE

## 2024-03-04 PROCEDURE — 1123F ACP DISCUSS/DSCN MKR DOCD: CPT | Performed by: INTERNAL MEDICINE

## 2024-03-04 PROCEDURE — 99214 OFFICE O/P EST MOD 30 MIN: CPT | Performed by: INTERNAL MEDICINE

## 2024-03-04 NOTE — PROGRESS NOTES
Jeannette Tripp (:  1950) is a 73 y.o. female, here for evaluation of the following chief complaint(s):  Hypertension (Patient is here for a 2 month hytn follow up )           Assessment/Plan  Jeannette was seen today for hypertension.    Diagnoses and all orders for this visit:    Essential hypertension    Edema of right lower extremity aug/sept  2018    Screening exam for skin cancer  -     Cancel: Luis A Lopez MD, Dermatology, [facility]     Luis A Lopez MD, Dermatology, [facility]    Pustular psoriasis  -     Cancel: Luis A Lopez MD, Dermatology, [facility]     Luis A Lopez MD, Dermatology, [facility]    Centrilobular emphysema (HCC)    Stage 3a chronic kidney disease (HCC)     For now   Bp med hctz only  We have tried multiple meds  There has been confusion on meds etc  The bp little high  But will continue to monitor   I suspect higher here than at home  Psoriasis good     Follow up 3 months          Patient Instructions   Follow up with     Subjective   SUBJECTIVE/OBJECTIVE:  Hypertension      Htn  At home 141/60 , 136  Had a bad cough  and used a nebulizer   9 days ago  Now fine    Right leg is better  Skin better, little swelling   Uses eucerin    Has not seen pulmonary since        Hemoglobin A1C (%)   Date Value   2023 5.3   2022 5.3   2022 5.4       Sodium (mmol/L)   Date Value   2024 138   2024 137   2024 142     Potassium (mmol/L)   Date Value   2024 4.3   2024 5.2 (H)   2024 5.4 (H)     Potassium reflex Magnesium (mmol/L)   Date Value   2020 4.2   2020 4.1     Chloride (mmol/L)   Date Value   2024 101   2024 100   2024 103     CO2 (mmol/L)   Date Value   2024 26   2024 28   2024 30     BUN (mg/dL)   Date Value   2024 14   2024 28 (H)   2024 18     Creatinine (mg/dL)   Date Value   2024 1.0

## 2024-03-12 ENCOUNTER — OFFICE VISIT (OUTPATIENT)
Dept: PULMONOLOGY | Age: 74
End: 2024-03-12
Payer: COMMERCIAL

## 2024-03-12 VITALS
HEART RATE: 101 BPM | SYSTOLIC BLOOD PRESSURE: 138 MMHG | DIASTOLIC BLOOD PRESSURE: 90 MMHG | BODY MASS INDEX: 32.05 KG/M2 | HEIGHT: 59 IN | WEIGHT: 159 LBS | TEMPERATURE: 97.8 F | RESPIRATION RATE: 18 BRPM | OXYGEN SATURATION: 93 %

## 2024-03-12 DIAGNOSIS — Z72.0 TOBACCO ABUSE: ICD-10-CM

## 2024-03-12 DIAGNOSIS — E66.9 OBESITY (BMI 30-39.9): ICD-10-CM

## 2024-03-12 DIAGNOSIS — J43.2 CENTRILOBULAR EMPHYSEMA (HCC): Primary | ICD-10-CM

## 2024-03-12 PROCEDURE — 99214 OFFICE O/P EST MOD 30 MIN: CPT | Performed by: INTERNAL MEDICINE

## 2024-03-12 PROCEDURE — 1123F ACP DISCUSS/DSCN MKR DOCD: CPT | Performed by: INTERNAL MEDICINE

## 2024-03-12 PROCEDURE — 3075F SYST BP GE 130 - 139MM HG: CPT | Performed by: INTERNAL MEDICINE

## 2024-03-12 PROCEDURE — 3080F DIAST BP >= 90 MM HG: CPT | Performed by: INTERNAL MEDICINE

## 2024-03-12 NOTE — PROGRESS NOTES
REASON FOR CONSULTATION/CC:   Chief Complaint   Patient presents with    Follow-up        Consult at request of  Filomena Vargas MD     PCP: Filomena Vargas MD    HISTORY OF PRESENT ILLNESS: Jeannette Tripp is a 73 y.o. year old female with a history of tobacco abuse who presents :          COPD     Using albuterol prn.   Nebulizer.  Using nightly.   Not using Trelegy.  - removed from list    COPD Assessment Test (CAT)                                 HTN  HCTZ      Tobacco abuse  Quit 2016.   Smoked about 5 cigarettes per day.    Therefore, 12 pack year hx      Obesity        Objective:   PHYSICAL EXAM:  Blood pressure (!) 138/90, pulse (!) 101, temperature 97.8 °F (36.6 °C), resp. rate 18, height 1.499 m (4' 11\"), weight 72.1 kg (159 lb), SpO2 93 %, not currently breastfeeding.'  Gen: No distress.    ENT:   Resp: No accessory muscle use. No crackles. No wheezes. No rhonchi.    CV: Regular rate. Regular rhythm. No murmur or rub. No edema.   Skin: Warm, dry, normal texture and turgor. No nodule on exposed extremities.   M/S: No cyanosis. No clubbing. No joint deformity.  Psych: Oriented x 3. No anxiety.  Awake. Alert. Intact judgement and insight. Good Mood / Affect.  Memory appears in tact        Data Reviewed:       Assessment:      Pulmonary Nodule, stable from 2015 to 2018   COPD, FEV1 47%  2015  Obesity     Plan:        Problem List Items Addressed This Visit       Tobacco abuse-pt quit 7/15--cold turkey     Peak 0.25 pack per day.  12 pack year history          Obesity (BMI 30-39.9)     Uncontrolled.  Discissed.          COPD (chronic obstructive pulmonary disease)--pfts show severe obst defect w severe decrease dlco  +bronchodilator response 6/14--repeat unchanged - Primary     Continues to be controlled with albuterol             This note was transcribed using Dragon Dictation software. Please disregard any translational errors.

## 2024-04-28 DIAGNOSIS — I10 ESSENTIAL HYPERTENSION: ICD-10-CM

## 2024-04-28 DIAGNOSIS — N18.31 STAGE 3A CHRONIC KIDNEY DISEASE (HCC): ICD-10-CM

## 2024-04-29 RX ORDER — HYDROCHLOROTHIAZIDE 12.5 MG/1
12.5 CAPSULE, GELATIN COATED ORAL EVERY MORNING
Qty: 90 CAPSULE | Refills: 1 | Status: SHIPPED | OUTPATIENT
Start: 2024-04-29

## 2024-06-04 ENCOUNTER — OFFICE VISIT (OUTPATIENT)
Dept: FAMILY MEDICINE CLINIC | Age: 74
End: 2024-06-04
Payer: COMMERCIAL

## 2024-06-04 VITALS
DIASTOLIC BLOOD PRESSURE: 80 MMHG | BODY MASS INDEX: 32.32 KG/M2 | RESPIRATION RATE: 16 BRPM | WEIGHT: 160 LBS | SYSTOLIC BLOOD PRESSURE: 142 MMHG | HEART RATE: 83 BPM | OXYGEN SATURATION: 96 %

## 2024-06-04 DIAGNOSIS — R00.2 PALPITATIONS: Primary | ICD-10-CM

## 2024-06-04 DIAGNOSIS — R73.03 PREDIABETES: ICD-10-CM

## 2024-06-04 DIAGNOSIS — R94.4 DECREASED GFR: ICD-10-CM

## 2024-06-04 DIAGNOSIS — L40.1 PUSTULAR PSORIASIS: ICD-10-CM

## 2024-06-04 DIAGNOSIS — I10 ESSENTIAL HYPERTENSION: ICD-10-CM

## 2024-06-04 DIAGNOSIS — Z13.220 SCREENING FOR LIPID DISORDERS: ICD-10-CM

## 2024-06-04 DIAGNOSIS — N18.9 CHRONIC KIDNEY DISEASE, UNSPECIFIED CKD STAGE: ICD-10-CM

## 2024-06-04 DIAGNOSIS — N18.31 STAGE 3A CHRONIC KIDNEY DISEASE (HCC): ICD-10-CM

## 2024-06-04 DIAGNOSIS — I49.49 PREMATURE BEAT: ICD-10-CM

## 2024-06-04 LAB
ALBUMIN SERPL-MCNC: 4.4 G/DL (ref 3.4–5)
ALBUMIN/GLOB SERPL: 1.4 {RATIO} (ref 1.1–2.2)
ALP SERPL-CCNC: 101 U/L (ref 40–129)
ALT SERPL-CCNC: 19 U/L (ref 10–40)
ANION GAP SERPL CALCULATED.3IONS-SCNC: 12 MMOL/L (ref 3–16)
AST SERPL-CCNC: 18 U/L (ref 15–37)
BILIRUB SERPL-MCNC: 0.4 MG/DL (ref 0–1)
BUN SERPL-MCNC: 17 MG/DL (ref 7–20)
CALCIUM SERPL-MCNC: 10.1 MG/DL (ref 8.3–10.6)
CHLORIDE SERPL-SCNC: 98 MMOL/L (ref 99–110)
CHOLEST SERPL-MCNC: 187 MG/DL (ref 0–199)
CO2 SERPL-SCNC: 30 MMOL/L (ref 21–32)
CREAT SERPL-MCNC: 1 MG/DL (ref 0.6–1.2)
CREAT UR-MCNC: 99 MG/DL (ref 28–259)
GFR SERPLBLD CREATININE-BSD FMLA CKD-EPI: 58 ML/MIN/{1.73_M2}
GLUCOSE SERPL-MCNC: 95 MG/DL (ref 70–99)
HDLC SERPL-MCNC: 85 MG/DL (ref 40–60)
LDLC SERPL CALC-MCNC: 86 MG/DL
MICROALBUMIN UR DL<=1MG/L-MCNC: <1.2 MG/DL
MICROALBUMIN/CREAT UR: NORMAL MG/G (ref 0–30)
POTASSIUM SERPL-SCNC: 4.3 MMOL/L (ref 3.5–5.1)
PROT SERPL-MCNC: 7.5 G/DL (ref 6.4–8.2)
SODIUM SERPL-SCNC: 140 MMOL/L (ref 136–145)
TRIGL SERPL-MCNC: 80 MG/DL (ref 0–150)
VLDLC SERPL CALC-MCNC: 16 MG/DL

## 2024-06-04 PROCEDURE — 1123F ACP DISCUSS/DSCN MKR DOCD: CPT | Performed by: INTERNAL MEDICINE

## 2024-06-04 PROCEDURE — 93000 ELECTROCARDIOGRAM COMPLETE: CPT | Performed by: INTERNAL MEDICINE

## 2024-06-04 PROCEDURE — 3077F SYST BP >= 140 MM HG: CPT | Performed by: INTERNAL MEDICINE

## 2024-06-04 PROCEDURE — 3079F DIAST BP 80-89 MM HG: CPT | Performed by: INTERNAL MEDICINE

## 2024-06-04 PROCEDURE — 99214 OFFICE O/P EST MOD 30 MIN: CPT | Performed by: INTERNAL MEDICINE

## 2024-06-04 RX ORDER — HYDROCHLOROTHIAZIDE 12.5 MG/1
CAPSULE, GELATIN COATED ORAL
Qty: 90 CAPSULE | Refills: 1
Start: 2024-06-04

## 2024-06-04 RX ORDER — HYDROCHLOROTHIAZIDE 25 MG/1
25 TABLET ORAL EVERY MORNING
Qty: 90 TABLET | Refills: 1 | Status: SHIPPED | OUTPATIENT
Start: 2024-06-04

## 2024-06-04 SDOH — ECONOMIC STABILITY: FOOD INSECURITY: WITHIN THE PAST 12 MONTHS, THE FOOD YOU BOUGHT JUST DIDN'T LAST AND YOU DIDN'T HAVE MONEY TO GET MORE.: NEVER TRUE

## 2024-06-04 SDOH — ECONOMIC STABILITY: INCOME INSECURITY: HOW HARD IS IT FOR YOU TO PAY FOR THE VERY BASICS LIKE FOOD, HOUSING, MEDICAL CARE, AND HEATING?: NOT HARD AT ALL

## 2024-06-04 SDOH — ECONOMIC STABILITY: FOOD INSECURITY: WITHIN THE PAST 12 MONTHS, YOU WORRIED THAT YOUR FOOD WOULD RUN OUT BEFORE YOU GOT MONEY TO BUY MORE.: NEVER TRUE

## 2024-06-04 NOTE — PROGRESS NOTES
Jeannette Tripp (:  1950) is a 73 y.o. female, here for evaluation of the following chief complaint(s):  Hypertension (Patient is here for a hytn follow up )      Assessment & Plan     Jeannette was seen today for hypertension.    Diagnoses and all orders for this visit:    Palpitations  -     Cancel: Cardiac holter monitor (1 day-2 day); Future  -     Cardiac holter monitor (1 day-2 day); Future    Essential hypertension  -     hydroCHLOROthiazide 12.5 MG capsule; Take 2 daily.    Stage 3a chronic kidney disease (HCC)  -     hydroCHLOROthiazide 12.5 MG capsule; Take 2 daily.    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     Dispense:  90 capsule     Refill:  1    hydroCHLOROthiazide (HYDRODIURIL) 25 MG tablet     Sig: Take 1 tablet by mouth every morning     Dispense:  90 tablet     Refill:  1     DO NOT FILL UNTIL PT REQUEST          Subjective   SUBJECTIVE/OBJECTIVE:  Hypertension      Htn  At home ? Not clear  She says normal  Here little high today  BP LITTLE HIGH TODAY AND HAS BEEN SEVERAL TIMES  ONLY ON  12.5 MG HCTZ NOW    Sometimes

## 2024-06-19 DIAGNOSIS — I47.19 ATRIAL TACHYCARDIA (HCC): Primary | ICD-10-CM

## 2024-06-19 DIAGNOSIS — I49.9 IRREGULAR HEART RATE: ICD-10-CM

## 2024-07-23 DIAGNOSIS — J43.2 CENTRILOBULAR EMPHYSEMA (HCC): ICD-10-CM

## 2024-07-23 RX ORDER — ALBUTEROL SULFATE 90 UG/1
2 AEROSOL, METERED RESPIRATORY (INHALATION) EVERY 6 HOURS PRN
Qty: 18 G | Refills: 0 | Status: CANCELLED | OUTPATIENT
Start: 2024-07-23

## 2024-07-24 RX ORDER — IPRATROPIUM BROMIDE AND ALBUTEROL SULFATE 2.5; .5 MG/3ML; MG/3ML
1 SOLUTION RESPIRATORY (INHALATION) EVERY 4 HOURS PRN
Qty: 50 ML | Refills: 0 | Status: SHIPPED | OUTPATIENT
Start: 2024-07-24

## 2024-07-24 NOTE — TELEPHONE ENCOUNTER
Spoke to the patient, she hasn't been wheezing recently. Her neb solution rx  so she is needing the new rx

## 2024-07-26 DIAGNOSIS — J43.2 CENTRILOBULAR EMPHYSEMA (HCC): ICD-10-CM

## 2024-07-26 RX ORDER — IPRATROPIUM BROMIDE AND ALBUTEROL SULFATE 2.5; .5 MG/3ML; MG/3ML
SOLUTION RESPIRATORY (INHALATION)
Qty: 90 ML | OUTPATIENT
Start: 2024-07-26

## 2024-09-24 ENCOUNTER — OFFICE VISIT (OUTPATIENT)
Dept: FAMILY MEDICINE CLINIC | Age: 74
End: 2024-09-24
Payer: COMMERCIAL

## 2024-09-24 VITALS
SYSTOLIC BLOOD PRESSURE: 144 MMHG | HEART RATE: 91 BPM | RESPIRATION RATE: 16 BRPM | WEIGHT: 161 LBS | OXYGEN SATURATION: 94 % | DIASTOLIC BLOOD PRESSURE: 74 MMHG | BODY MASS INDEX: 32.52 KG/M2

## 2024-09-24 DIAGNOSIS — I49.9 IRREGULAR HEART RATE: ICD-10-CM

## 2024-09-24 DIAGNOSIS — I10 ESSENTIAL HYPERTENSION: ICD-10-CM

## 2024-09-24 DIAGNOSIS — R73.03 PREDIABETES: ICD-10-CM

## 2024-09-24 DIAGNOSIS — I49.1 PREMATURE ATRIAL CONTRACTIONS: ICD-10-CM

## 2024-09-24 DIAGNOSIS — R06.02 SOB (SHORTNESS OF BREATH) ON EXERTION: Primary | ICD-10-CM

## 2024-09-24 DIAGNOSIS — R87.613 HGSIL (HIGH GRADE SQUAMOUS INTRAEPITHELIAL LESION) ON PAP SMEAR OF CERVIX: ICD-10-CM

## 2024-09-24 DIAGNOSIS — J43.2 CENTRILOBULAR EMPHYSEMA (HCC): ICD-10-CM

## 2024-09-24 PROCEDURE — G2211 COMPLEX E/M VISIT ADD ON: HCPCS | Performed by: INTERNAL MEDICINE

## 2024-09-24 PROCEDURE — 1123F ACP DISCUSS/DSCN MKR DOCD: CPT | Performed by: INTERNAL MEDICINE

## 2024-09-24 PROCEDURE — 3077F SYST BP >= 140 MM HG: CPT | Performed by: INTERNAL MEDICINE

## 2024-09-24 PROCEDURE — 3078F DIAST BP <80 MM HG: CPT | Performed by: INTERNAL MEDICINE

## 2024-09-24 PROCEDURE — 99214 OFFICE O/P EST MOD 30 MIN: CPT | Performed by: INTERNAL MEDICINE

## 2024-09-24 PROCEDURE — 93000 ELECTROCARDIOGRAM COMPLETE: CPT | Performed by: INTERNAL MEDICINE

## 2024-09-24 RX ORDER — ALBUTEROL SULFATE 90 UG/1
2 INHALANT RESPIRATORY (INHALATION) EVERY 6 HOURS PRN
Qty: 18 G | Refills: 0 | Status: SHIPPED | OUTPATIENT
Start: 2024-09-24

## 2024-09-25 ENCOUNTER — OFFICE VISIT (OUTPATIENT)
Dept: PULMONOLOGY | Age: 74
End: 2024-09-25
Payer: COMMERCIAL

## 2024-09-25 VITALS
DIASTOLIC BLOOD PRESSURE: 90 MMHG | WEIGHT: 161.2 LBS | TEMPERATURE: 97 F | OXYGEN SATURATION: 93 % | HEIGHT: 59 IN | HEART RATE: 98 BPM | SYSTOLIC BLOOD PRESSURE: 142 MMHG | BODY MASS INDEX: 32.5 KG/M2 | RESPIRATION RATE: 16 BRPM

## 2024-09-25 DIAGNOSIS — E66.9 OBESITY (BMI 30-39.9): ICD-10-CM

## 2024-09-25 DIAGNOSIS — J43.2 CENTRILOBULAR EMPHYSEMA (HCC): Primary | ICD-10-CM

## 2024-09-25 LAB
ALBUMIN SERPL-MCNC: 4.4 G/DL (ref 3.4–5)
ANION GAP SERPL CALCULATED.3IONS-SCNC: 16 MMOL/L (ref 3–16)
BUN SERPL-MCNC: 16 MG/DL (ref 7–20)
CALCIUM SERPL-MCNC: 9.7 MG/DL (ref 8.3–10.6)
CHLORIDE SERPL-SCNC: 102 MMOL/L (ref 99–110)
CO2 SERPL-SCNC: 24 MMOL/L (ref 21–32)
CREAT SERPL-MCNC: 1 MG/DL (ref 0.6–1.2)
EST. AVERAGE GLUCOSE BLD GHB EST-MCNC: 111.2 MG/DL
GFR SERPLBLD CREATININE-BSD FMLA CKD-EPI: 58 ML/MIN/{1.73_M2}
GLUCOSE SERPL-MCNC: 85 MG/DL (ref 70–99)
HBA1C MFR BLD: 5.5 %
PHOSPHATE SERPL-MCNC: 3 MG/DL (ref 2.5–4.9)
POTASSIUM SERPL-SCNC: 4.2 MMOL/L (ref 3.5–5.1)
SODIUM SERPL-SCNC: 142 MMOL/L (ref 136–145)

## 2024-09-25 PROCEDURE — 1123F ACP DISCUSS/DSCN MKR DOCD: CPT | Performed by: INTERNAL MEDICINE

## 2024-09-25 PROCEDURE — 3077F SYST BP >= 140 MM HG: CPT | Performed by: INTERNAL MEDICINE

## 2024-09-25 PROCEDURE — 99214 OFFICE O/P EST MOD 30 MIN: CPT | Performed by: INTERNAL MEDICINE

## 2024-09-25 PROCEDURE — 3080F DIAST BP >= 90 MM HG: CPT | Performed by: INTERNAL MEDICINE

## 2024-10-19 DIAGNOSIS — J43.2 CENTRILOBULAR EMPHYSEMA (HCC): ICD-10-CM

## 2024-10-21 RX ORDER — ALBUTEROL SULFATE 90 UG/1
2 INHALANT RESPIRATORY (INHALATION) EVERY 6 HOURS PRN
Qty: 18 G | Refills: 0 | OUTPATIENT
Start: 2024-10-21

## 2025-02-25 ENCOUNTER — OFFICE VISIT (OUTPATIENT)
Dept: FAMILY MEDICINE CLINIC | Age: 75
End: 2025-02-25
Payer: MEDICARE

## 2025-02-25 VITALS
RESPIRATION RATE: 18 BRPM | WEIGHT: 155 LBS | DIASTOLIC BLOOD PRESSURE: 78 MMHG | BODY MASS INDEX: 31.25 KG/M2 | HEART RATE: 96 BPM | OXYGEN SATURATION: 95 % | SYSTOLIC BLOOD PRESSURE: 144 MMHG | HEIGHT: 59 IN

## 2025-02-25 DIAGNOSIS — I10 ESSENTIAL HYPERTENSION: ICD-10-CM

## 2025-02-25 DIAGNOSIS — Z00.00 ANNUAL WELLNESS VISIT: ICD-10-CM

## 2025-02-25 DIAGNOSIS — N18.31 STAGE 3A CHRONIC KIDNEY DISEASE (HCC): ICD-10-CM

## 2025-02-25 DIAGNOSIS — Z00.00 ENCOUNTER FOR ANNUAL WELLNESS VISIT (AWV) IN MEDICARE PATIENT: Primary | ICD-10-CM

## 2025-02-25 PROCEDURE — G2211 COMPLEX E/M VISIT ADD ON: HCPCS | Performed by: INTERNAL MEDICINE

## 2025-02-25 PROCEDURE — 1123F ACP DISCUSS/DSCN MKR DOCD: CPT | Performed by: INTERNAL MEDICINE

## 2025-02-25 PROCEDURE — G0439 PPPS, SUBSEQ VISIT: HCPCS | Performed by: INTERNAL MEDICINE

## 2025-02-25 PROCEDURE — 3077F SYST BP >= 140 MM HG: CPT | Performed by: INTERNAL MEDICINE

## 2025-02-25 PROCEDURE — 1159F MED LIST DOCD IN RCRD: CPT | Performed by: INTERNAL MEDICINE

## 2025-02-25 PROCEDURE — 3078F DIAST BP <80 MM HG: CPT | Performed by: INTERNAL MEDICINE

## 2025-02-25 PROCEDURE — 99214 OFFICE O/P EST MOD 30 MIN: CPT | Performed by: INTERNAL MEDICINE

## 2025-02-25 PROCEDURE — 1160F RVW MEDS BY RX/DR IN RCRD: CPT | Performed by: INTERNAL MEDICINE

## 2025-02-25 RX ORDER — HYDROCHLOROTHIAZIDE 25 MG/1
25 TABLET ORAL EVERY MORNING
Qty: 90 TABLET | Refills: 0 | Status: SHIPPED | OUTPATIENT
Start: 2025-02-25

## 2025-02-25 SDOH — ECONOMIC STABILITY: FOOD INSECURITY: WITHIN THE PAST 12 MONTHS, THE FOOD YOU BOUGHT JUST DIDN'T LAST AND YOU DIDN'T HAVE MONEY TO GET MORE.: NEVER TRUE

## 2025-02-25 SDOH — ECONOMIC STABILITY: FOOD INSECURITY: WITHIN THE PAST 12 MONTHS, YOU WORRIED THAT YOUR FOOD WOULD RUN OUT BEFORE YOU GOT MONEY TO BUY MORE.: NEVER TRUE

## 2025-02-25 ASSESSMENT — PATIENT HEALTH QUESTIONNAIRE - PHQ9
SUM OF ALL RESPONSES TO PHQ9 QUESTIONS 1 & 2: 0
SUM OF ALL RESPONSES TO PHQ QUESTIONS 1-9: 0
SUM OF ALL RESPONSES TO PHQ QUESTIONS 1-9: 0
2. FEELING DOWN, DEPRESSED OR HOPELESS: NOT AT ALL
SUM OF ALL RESPONSES TO PHQ QUESTIONS 1-9: 0
1. LITTLE INTEREST OR PLEASURE IN DOING THINGS: NOT AT ALL
SUM OF ALL RESPONSES TO PHQ QUESTIONS 1-9: 0

## 2025-02-25 NOTE — PROGRESS NOTES
No  Interventions:  Loves vegetables  the ans she does eat well    Abnormal BMI (obese):  Body mass index is 31.31 kg/m². (!) Abnormal  Interventions:  Calli cold does not go out  Encourage her to walk again  Lives on third  floor        Dentist Screen:  Have you seen the dentist within the past year?: (!) No    Intervention:  Recommend dentist     Vision Screen:  Visual Acuity screen is abnormal due to a score of 20/25 or worse.  Interventions:   Every yr  , due in april      Advanced Directives:  Do you have a Living Will?: (!) No    Intervention:  Recommend healthcare power of          Lung Cancer Screening:  Quit smoking  11 yr ago  Sees pulmonary next month            Objective   Vision Screening    Right eye Left eye Both eyes   Without correction 20/25 20/25 20/20   With correction         Vitals:    02/25/25 1314   BP: (!) 144/78   Pulse: 96   Resp: 18   SpO2: 95%   Weight: 70.3 kg (155 lb)   Height: 1.499 m (4' 11\")      Body mass index is 31.31 kg/m².                  Allergies   Allergen Reactions    Losartan      Not an allergy  Gfr 39 on 100mg dose   See note 6/1/23    Sulfa Antibiotics Rash     Prior to Visit Medications    Medication Sig Taking? Authorizing Provider   hydroCHLOROthiazide (HYDRODIURIL) 25 MG tablet Take 1 tablet by mouth every morning Yes Filomena Vargas MD   albuterol sulfate HFA (PROVENTIL;VENTOLIN;PROAIR) 108 (90 Base) MCG/ACT inhaler Inhale 2 puffs into the lungs every 6 hours as needed for Wheezing or Shortness of Breath for wheezing or shortness of breath Yes Filomena Vargas MD   ipratropium 0.5 mg-albuterol 2.5 mg (DUONEB) 0.5-2.5 (3) MG/3ML SOLN nebulizer solution Take 3 mLs by nebulization every 4 hours as needed for Shortness of Breath Yes Filomena Vargas MD       [facility] (Including outside providers/suppliers regularly involved in providing care):   Patient Care Team:  Filomena Vargas MD as PCP - General (Internal Medicine)  Filomena Vargas MD as PCP -

## 2025-02-25 NOTE — PATIENT INSTRUCTIONS
Follow up 7-10 days in our office for ma bp ck and renal      Avoid ibuprofen , aleve other NSAIDS  that are Rx  such as meloxicam, celebrex , diclofenac , naprosyn   etc.  Also do not get contrast with imaging studies unless you discuss with your ordering doctor.  Avoid the antibiotic called BACTRIM ( trimethoprim /sulfamethoxazole)- it can worsen the kidney function

## 2025-03-17 ENCOUNTER — NURSE ONLY (OUTPATIENT)
Dept: FAMILY MEDICINE CLINIC | Age: 75
End: 2025-03-17

## 2025-03-17 VITALS — DIASTOLIC BLOOD PRESSURE: 80 MMHG | SYSTOLIC BLOOD PRESSURE: 142 MMHG

## 2025-03-17 DIAGNOSIS — I10 ESSENTIAL HYPERTENSION: ICD-10-CM

## 2025-03-18 ENCOUNTER — RESULTS FOLLOW-UP (OUTPATIENT)
Dept: FAMILY MEDICINE CLINIC | Age: 75
End: 2025-03-18

## 2025-03-18 DIAGNOSIS — E87.6 HYPOKALEMIA: Primary | ICD-10-CM

## 2025-03-18 DIAGNOSIS — E87.6 HYPOKALEMIA: ICD-10-CM

## 2025-03-18 DIAGNOSIS — I10 ESSENTIAL HYPERTENSION: ICD-10-CM

## 2025-03-18 LAB
ALBUMIN SERPL-MCNC: 4.3 G/DL (ref 3.4–5)
ANION GAP SERPL CALCULATED.3IONS-SCNC: 11 MMOL/L (ref 3–16)
BUN SERPL-MCNC: 23 MG/DL (ref 7–20)
CALCIUM SERPL-MCNC: 9.9 MG/DL (ref 8.3–10.6)
CHLORIDE SERPL-SCNC: 98 MMOL/L (ref 99–110)
CO2 SERPL-SCNC: 29 MMOL/L (ref 21–32)
CREAT SERPL-MCNC: 1.1 MG/DL (ref 0.6–1.2)
GFR SERPLBLD CREATININE-BSD FMLA CKD-EPI: 52 ML/MIN/{1.73_M2}
GLUCOSE SERPL-MCNC: 112 MG/DL (ref 70–99)
PHOSPHATE SERPL-MCNC: 2.7 MG/DL (ref 2.5–4.9)
POTASSIUM SERPL-SCNC: 3.2 MMOL/L (ref 3.5–5.1)
SODIUM SERPL-SCNC: 138 MMOL/L (ref 136–145)

## 2025-03-18 RX ORDER — POTASSIUM CHLORIDE 1500 MG/1
20 TABLET, EXTENDED RELEASE ORAL DAILY
Qty: 30 TABLET | Refills: 0 | Status: SHIPPED | OUTPATIENT
Start: 2025-03-18

## 2025-03-18 RX ORDER — NIFEDIPINE 30 MG/1
30 TABLET, EXTENDED RELEASE ORAL DAILY
Qty: 30 TABLET | Refills: 0 | Status: SHIPPED | OUTPATIENT
Start: 2025-03-18

## 2025-03-19 ENCOUNTER — OFFICE VISIT (OUTPATIENT)
Dept: PULMONOLOGY | Age: 75
End: 2025-03-19
Payer: MEDICARE

## 2025-03-19 VITALS
DIASTOLIC BLOOD PRESSURE: 76 MMHG | TEMPERATURE: 97.6 F | BODY MASS INDEX: 30.84 KG/M2 | WEIGHT: 153 LBS | HEIGHT: 59 IN | SYSTOLIC BLOOD PRESSURE: 132 MMHG | HEART RATE: 88 BPM | OXYGEN SATURATION: 92 % | RESPIRATION RATE: 18 BRPM

## 2025-03-19 DIAGNOSIS — E66.9 OBESITY (BMI 30-39.9): ICD-10-CM

## 2025-03-19 DIAGNOSIS — J43.2 CENTRILOBULAR EMPHYSEMA (HCC): Primary | ICD-10-CM

## 2025-03-19 PROCEDURE — 99214 OFFICE O/P EST MOD 30 MIN: CPT | Performed by: INTERNAL MEDICINE

## 2025-03-19 PROCEDURE — 1123F ACP DISCUSS/DSCN MKR DOCD: CPT | Performed by: INTERNAL MEDICINE

## 2025-03-19 PROCEDURE — 1159F MED LIST DOCD IN RCRD: CPT | Performed by: INTERNAL MEDICINE

## 2025-03-19 PROCEDURE — 3078F DIAST BP <80 MM HG: CPT | Performed by: INTERNAL MEDICINE

## 2025-03-19 PROCEDURE — 3075F SYST BP GE 130 - 139MM HG: CPT | Performed by: INTERNAL MEDICINE

## 2025-03-19 RX ORDER — POTASSIUM CHLORIDE 1500 MG/1
20 TABLET, EXTENDED RELEASE ORAL DAILY
Qty: 90 TABLET | OUTPATIENT
Start: 2025-03-19

## 2025-03-19 RX ORDER — NIFEDIPINE 30 MG/1
30 TABLET, EXTENDED RELEASE ORAL DAILY
Qty: 90 TABLET | OUTPATIENT
Start: 2025-03-19

## 2025-03-19 NOTE — PROGRESS NOTES
REASON FOR CONSULTATION/CC:   Chief Complaint   Patient presents with    COPD        Consult at request of  Filomena Vargas MD     PCP: Filomena Vargas MD    HISTORY OF PRESENT ILLNESS: Jeannette Tripp is a 74 y.o. year old female with a history of tobacco abuse who presents :               COPD Assessment Test (CAT)                                        Tobacco abuse  Quit 2016.   Smoked about 5 cigarettes per day.    Therefore, 12 pack year hx         Copd  Only using Duoneb's prn.    Doing well.      Obesity   Uncontrolled.    Discussed.  Lost a few lbs.               Objective:   PHYSICAL EXAM:  Blood pressure 132/76, pulse 88, temperature 97.6 °F (36.4 °C), temperature source Temporal, resp. rate 18, height 1.499 m (4' 11\"), weight 69.4 kg (153 lb), SpO2 92%, not currently breastfeeding.'    Gen: No distress.    Eyes:   ENT:   Neck:     Resp: No accessory muscle use. No crackles. No wheezes. No rhonchi.    CV: Regular rate. Regular rhythm. No murmur or rub. No edema.   GI:    Skin: Warm, dry, normal texture and turgor. No nodule on exposed extremities.   Lymph:   M/S: No cyanosis. No clubbing. No joint deformity.    Neuro: Moves all four extremities.    Psych: Oriented x 3. No anxiety.  Awake. Alert. Intact judgement and insight.         Data Reviewed:       Assessment:      Pulmonary Nodule, stable from 2015 to 2018   COPD, FEV1 47%  2015  Obesity     Plan:        Problem List Items Addressed This Visit       Obesity (BMI 30-39.9)     Exercising and working on weight.          Centrilobular emphysema (HCC) - Primary    albuterol hfa vs Duoneb's .      Doing well.                  This note was transcribed using Dragon Dictation software. Please disregard any translational errors.

## 2025-03-20 ENCOUNTER — TELEPHONE (OUTPATIENT)
Dept: FAMILY MEDICINE CLINIC | Age: 75
End: 2025-03-20

## 2025-03-20 NOTE — RESULT ENCOUNTER NOTE
I spoke to the pt. Notified of recent labs. She will start the procardia with the hctz. She'll also  the potassium.     Pt will get her labs done on monday and then will follow up with Dr. Vargas on Wednesday.

## 2025-03-24 DIAGNOSIS — E87.6 HYPOKALEMIA: ICD-10-CM

## 2025-03-24 LAB
ALBUMIN SERPL-MCNC: 4.2 G/DL (ref 3.4–5)
ANION GAP SERPL CALCULATED.3IONS-SCNC: 11 MMOL/L (ref 3–16)
BUN SERPL-MCNC: 20 MG/DL (ref 7–20)
CALCIUM SERPL-MCNC: 9.7 MG/DL (ref 8.3–10.6)
CHLORIDE SERPL-SCNC: 105 MMOL/L (ref 99–110)
CO2 SERPL-SCNC: 27 MMOL/L (ref 21–32)
CREAT SERPL-MCNC: 1 MG/DL (ref 0.6–1.2)
GFR SERPLBLD CREATININE-BSD FMLA CKD-EPI: 58 ML/MIN/{1.73_M2}
GLUCOSE SERPL-MCNC: 94 MG/DL (ref 70–99)
PHOSPHATE SERPL-MCNC: 2.6 MG/DL (ref 2.5–4.9)
POTASSIUM SERPL-SCNC: 4.5 MMOL/L (ref 3.5–5.1)
SODIUM SERPL-SCNC: 143 MMOL/L (ref 136–145)

## 2025-03-25 ENCOUNTER — RESULTS FOLLOW-UP (OUTPATIENT)
Dept: FAMILY MEDICINE CLINIC | Age: 75
End: 2025-03-25

## 2025-03-26 ENCOUNTER — OFFICE VISIT (OUTPATIENT)
Dept: FAMILY MEDICINE CLINIC | Age: 75
End: 2025-03-26
Payer: MEDICARE

## 2025-03-26 VITALS
WEIGHT: 155.6 LBS | HEIGHT: 59 IN | DIASTOLIC BLOOD PRESSURE: 80 MMHG | OXYGEN SATURATION: 97 % | HEART RATE: 86 BPM | RESPIRATION RATE: 16 BRPM | SYSTOLIC BLOOD PRESSURE: 150 MMHG | BODY MASS INDEX: 31.37 KG/M2

## 2025-03-26 DIAGNOSIS — E87.6 HYPOKALEMIA: Primary | ICD-10-CM

## 2025-03-26 DIAGNOSIS — I10 ESSENTIAL HYPERTENSION: ICD-10-CM

## 2025-03-26 DIAGNOSIS — N18.9 CHRONIC KIDNEY DISEASE, UNSPECIFIED CKD STAGE: ICD-10-CM

## 2025-03-26 PROCEDURE — 3077F SYST BP >= 140 MM HG: CPT | Performed by: INTERNAL MEDICINE

## 2025-03-26 PROCEDURE — 3079F DIAST BP 80-89 MM HG: CPT | Performed by: INTERNAL MEDICINE

## 2025-03-26 PROCEDURE — G2211 COMPLEX E/M VISIT ADD ON: HCPCS | Performed by: INTERNAL MEDICINE

## 2025-03-26 PROCEDURE — 99214 OFFICE O/P EST MOD 30 MIN: CPT | Performed by: INTERNAL MEDICINE

## 2025-03-26 PROCEDURE — 1159F MED LIST DOCD IN RCRD: CPT | Performed by: INTERNAL MEDICINE

## 2025-03-26 PROCEDURE — 1123F ACP DISCUSS/DSCN MKR DOCD: CPT | Performed by: INTERNAL MEDICINE

## 2025-03-26 PROCEDURE — 1160F RVW MEDS BY RX/DR IN RCRD: CPT | Performed by: INTERNAL MEDICINE

## 2025-03-26 NOTE — PATIENT INSTRUCTIONS
You should be on the diuretic   HCTZ 25 mg day , procardia and a Potassium pill for the blood pressure    Follow up  2 weeks  for bp ck       Follow up ~ 10 days for repeat renal  ( lab) on both the diuretic and the potassium pill

## 2025-03-26 NOTE — PROGRESS NOTES
Jeannette Tripp (:  1950) is a 74 y.o. female,Established patient, here for evaluation of the following chief complaint(s):  Bp follow up       Assessment & Plan  Essential hypertension   Confusion over meds  I am concerned about memory    She stopped the diuretic   Was not supposed to do this      Orders:    Renal Function Panel; Future    Chronic kidney disease, unspecified CKD stage   Will re ck labs 2 weeks on the potassium and the diuretic    Orders:    Renal Function Panel; Future    Hypokalemia   Repeat labs  ~10 days    Orders:    Renal Function Panel; Future        Subjective       HPI    Doubled the diuretic dose last time  She got confused .  She stopped the hctz  She is taking the procardia and the potassium  She stopped the \"25\"  She said maybe she misunderstood    Review of Systems            Objective     LMP  (LMP Unknown)      Physical Exam  Constitutional:       Appearance: Normal appearance.   HENT:      Head: Normocephalic and atraumatic.   Pulmonary:      Effort: Pulmonary effort is normal.   Neurological:      Mental Status: She is alert.   Psychiatric:         Mood and Affect: Mood normal.         Behavior: Behavior normal.         Thought Content: Thought content normal.         Judgment: Judgment normal.                      An electronic signature was used to authenticate this note.    --YONIS FERMIN MD

## 2025-03-26 NOTE — ASSESSMENT & PLAN NOTE
Will re ck labs 2 weeks on the potassium and the diuretic    Orders:    Renal Function Panel; Future

## 2025-03-26 NOTE — ASSESSMENT & PLAN NOTE
Confusion over meds  I am concerned about memory    She stopped the diuretic   Was not supposed to do this      Orders:    Renal Function Panel; Future

## 2025-04-07 DIAGNOSIS — I10 ESSENTIAL HYPERTENSION: ICD-10-CM

## 2025-04-07 DIAGNOSIS — N18.9 CHRONIC KIDNEY DISEASE, UNSPECIFIED CKD STAGE: ICD-10-CM

## 2025-04-07 DIAGNOSIS — E87.6 HYPOKALEMIA: ICD-10-CM

## 2025-04-07 LAB
ALBUMIN SERPL-MCNC: 4.4 G/DL (ref 3.4–5)
ANION GAP SERPL CALCULATED.3IONS-SCNC: 13 MMOL/L (ref 3–16)
BUN SERPL-MCNC: 22 MG/DL (ref 7–20)
CALCIUM SERPL-MCNC: 9.9 MG/DL (ref 8.3–10.6)
CHLORIDE SERPL-SCNC: 97 MMOL/L (ref 99–110)
CO2 SERPL-SCNC: 27 MMOL/L (ref 21–32)
CREAT SERPL-MCNC: 1.2 MG/DL (ref 0.6–1.2)
GFR SERPLBLD CREATININE-BSD FMLA CKD-EPI: 47 ML/MIN/{1.73_M2}
GLUCOSE SERPL-MCNC: 108 MG/DL (ref 70–99)
PHOSPHATE SERPL-MCNC: 2.9 MG/DL (ref 2.5–4.9)
POTASSIUM SERPL-SCNC: 4 MMOL/L (ref 3.5–5.1)
SODIUM SERPL-SCNC: 137 MMOL/L (ref 136–145)

## 2025-04-08 ENCOUNTER — RESULTS FOLLOW-UP (OUTPATIENT)
Dept: FAMILY MEDICINE CLINIC | Age: 75
End: 2025-04-08

## 2025-04-08 DIAGNOSIS — I10 ESSENTIAL HYPERTENSION: ICD-10-CM

## 2025-04-08 RX ORDER — NIFEDIPINE 30 MG/1
TABLET, EXTENDED RELEASE ORAL
Qty: 30 TABLET | Refills: 0
Start: 2025-04-08 | End: 2025-04-23

## 2025-04-08 NOTE — RESULT ENCOUNTER NOTE
I spoke to the pt, notified of recent labs. Pt verbalized understanding, she will double the procardia. I did advise to stop the hctx and potassium. Pt will also bring in her rx bottles to her appt tomorrow.

## 2025-04-09 ENCOUNTER — OFFICE VISIT (OUTPATIENT)
Dept: FAMILY MEDICINE CLINIC | Age: 75
End: 2025-04-09
Payer: MEDICARE

## 2025-04-09 VITALS
DIASTOLIC BLOOD PRESSURE: 80 MMHG | BODY MASS INDEX: 30.7 KG/M2 | OXYGEN SATURATION: 97 % | HEART RATE: 93 BPM | WEIGHT: 152 LBS | RESPIRATION RATE: 20 BRPM | SYSTOLIC BLOOD PRESSURE: 148 MMHG

## 2025-04-09 DIAGNOSIS — N18.31 STAGE 3A CHRONIC KIDNEY DISEASE (HCC): ICD-10-CM

## 2025-04-09 DIAGNOSIS — I10 ESSENTIAL HYPERTENSION: Primary | ICD-10-CM

## 2025-04-09 PROCEDURE — 1123F ACP DISCUSS/DSCN MKR DOCD: CPT | Performed by: INTERNAL MEDICINE

## 2025-04-09 PROCEDURE — G2211 COMPLEX E/M VISIT ADD ON: HCPCS | Performed by: INTERNAL MEDICINE

## 2025-04-09 PROCEDURE — 3077F SYST BP >= 140 MM HG: CPT | Performed by: INTERNAL MEDICINE

## 2025-04-09 PROCEDURE — 1159F MED LIST DOCD IN RCRD: CPT | Performed by: INTERNAL MEDICINE

## 2025-04-09 PROCEDURE — 3079F DIAST BP 80-89 MM HG: CPT | Performed by: INTERNAL MEDICINE

## 2025-04-09 PROCEDURE — 99213 OFFICE O/P EST LOW 20 MIN: CPT | Performed by: INTERNAL MEDICINE

## 2025-04-09 RX ORDER — NIFEDIPINE 60 MG/1
60 TABLET, EXTENDED RELEASE ORAL DAILY
Qty: 30 TABLET | Refills: 0 | Status: SHIPPED | OUTPATIENT
Start: 2025-04-09

## 2025-04-09 NOTE — PROGRESS NOTES
Jeannette Tripp (:  1950) is a 74 y.o. female, here for evaluation of the following chief complaint(s):  Medication Check (Pt is here for a BP med check. )      Assessment & Plan   Jeannette was seen today for medication check.    Diagnoses and all orders for this visit:    Essential hypertension  Comments:  will double the procardia to 60 mg  she took that yesterday and today  avoid diuretic right now due to gfr going down  avoid nsaids  Orders:  -     NIFEdipine (PROCARDIA XL) 60 MG extended release tablet; Take 1 tablet by mouth daily    Stage 3a chronic kidney disease (HCC)  Comments:  see comments above with htn         Orders Placed This Encounter   Medications    NIFEdipine (PROCARDIA XL) 60 MG extended release tablet     Sig: Take 1 tablet by mouth daily     Dispense:  30 tablet     Refill:  0        Return in about 2 weeks (around 2025).         Subjective   SUBJECTIVE/OBJECTIVE:  HPI  Feeling fine   Bp little high today  Doubled the procardia yesterday and today    Will need a refill    I stopped the potassium and the hctz bc of her kidney function    On   this yr I doubled the diuretic to 25 mg  On 3/17 low potassium   potassium was added  Gfr down so had her stop the diuretic and K      Hemoglobin A1C (%)   Date Value   2024 5.5   2023 5.3   2022 5.3       Sodium (mmol/L)   Date Value   2025 137   2025 143   2025 138     Potassium (mmol/L)   Date Value   2025 4.0   2025 4.5   2025 3.2 (L)     Potassium reflex Magnesium (mmol/L)   Date Value   2020 4.2   2020 4.1     Chloride (mmol/L)   Date Value   2025 97 (L)   2025 105   2025 98 (L)     CO2 (mmol/L)   Date Value   2025 27   2025 27   2025 29     BUN (mg/dL)   Date Value   2025 22 (H)   2025 20   2025 23 (H)     Creatinine (mg/dL)   Date Value   2025 1.2   2025 1.0   2025 1.1     Glucose (mg/dL)   Date Value

## 2025-04-23 ENCOUNTER — OFFICE VISIT (OUTPATIENT)
Dept: FAMILY MEDICINE CLINIC | Age: 75
End: 2025-04-23
Payer: MEDICARE

## 2025-04-23 VITALS
BODY MASS INDEX: 31.1 KG/M2 | WEIGHT: 154 LBS | DIASTOLIC BLOOD PRESSURE: 70 MMHG | OXYGEN SATURATION: 95 % | HEART RATE: 95 BPM | SYSTOLIC BLOOD PRESSURE: 138 MMHG | RESPIRATION RATE: 18 BRPM

## 2025-04-23 DIAGNOSIS — R22.41 LOCALIZED SWELLING OF RIGHT LOWER EXTREMITY: Primary | ICD-10-CM

## 2025-04-23 DIAGNOSIS — I10 ESSENTIAL HYPERTENSION: ICD-10-CM

## 2025-04-23 DIAGNOSIS — L40.1 PUSTULAR PSORIASIS: ICD-10-CM

## 2025-04-23 PROCEDURE — 1159F MED LIST DOCD IN RCRD: CPT | Performed by: INTERNAL MEDICINE

## 2025-04-23 PROCEDURE — 3075F SYST BP GE 130 - 139MM HG: CPT | Performed by: INTERNAL MEDICINE

## 2025-04-23 PROCEDURE — 1123F ACP DISCUSS/DSCN MKR DOCD: CPT | Performed by: INTERNAL MEDICINE

## 2025-04-23 PROCEDURE — 3078F DIAST BP <80 MM HG: CPT | Performed by: INTERNAL MEDICINE

## 2025-04-23 PROCEDURE — 99213 OFFICE O/P EST LOW 20 MIN: CPT | Performed by: INTERNAL MEDICINE

## 2025-04-23 PROCEDURE — G2211 COMPLEX E/M VISIT ADD ON: HCPCS | Performed by: INTERNAL MEDICINE

## 2025-04-23 RX ORDER — NIFEDIPINE 60 MG/1
60 TABLET, EXTENDED RELEASE ORAL DAILY
Qty: 90 TABLET | Refills: 1 | Status: SHIPPED | OUTPATIENT
Start: 2025-04-23

## 2025-04-23 NOTE — PROGRESS NOTES
Jeannette Tripp (:  1950) is a 74 y.o. female, here for evaluation of the following chief complaint(s):  Hypertension (Pt is here for a BP med f/u )      Assessment & Plan   Jeannette was seen today for hypertension.    Diagnoses and all orders for this visit:    Localized swelling of right lower extremity    Essential hypertension  Comments:  will double the procardia to 60 mg  she took that yesterday and today  avoid diuretic right now due to gfr going down  avoid nsaids  Orders:  -     NIFEdipine (PROCARDIA XL) 60 MG extended release tablet; Take 1 tablet by mouth daily    Pustular psoriasis, Dr. Garibay         Orders Placed This Encounter   Medications    NIFEdipine (PROCARDIA XL) 60 MG extended release tablet     Sig: Take 1 tablet by mouth daily     Dispense:  90 tablet     Refill:  1      Blood pressure is acceptable today.  Organ to keep her on the same medication.  She has the swelling again in her right lower extremity this has been evaluated at least twice in the past with Dopplers.  She has never had a blood clot not going to repeat the Doppler today.  She may be swelling more due to the Procardia but given all the issues we have had with her blood pressure medications were going to keep her on this and follow-up in 3 months  She has a history of pustular psoriasis but I do not think she wants to follow-up with dermatology.  Had a Doppler on the right on  and  in 2018.  She had a Doppler on the left in 2018  Return in about 3 months (around 2025).         Subjective   SUBJECTIVE/OBJECTIVE:  HPI  On  procardia 60 mg  Bp pretty good  Just a little swelling  Not cking at home        Hemoglobin A1C (%)   Date Value   2024 5.5   2023 5.3   2022 5.3       Sodium (mmol/L)   Date Value   2025 137   2025 143   2025 138     Potassium (mmol/L)   Date Value   2025 4.0   2025 4.5   2025 3.2 (L)     Potassium reflex Magnesium (mmol/L)   Date Value

## 2025-05-17 DIAGNOSIS — I10 ESSENTIAL HYPERTENSION: ICD-10-CM

## 2025-05-19 RX ORDER — NIFEDIPINE 60 MG/1
60 TABLET, EXTENDED RELEASE ORAL DAILY
Qty: 90 TABLET | Refills: 1 | Status: SHIPPED | OUTPATIENT
Start: 2025-05-19

## 2025-07-02 DIAGNOSIS — J43.2 CENTRILOBULAR EMPHYSEMA (HCC): ICD-10-CM

## 2025-07-02 RX ORDER — IPRATROPIUM BROMIDE AND ALBUTEROL SULFATE 2.5; .5 MG/3ML; MG/3ML
1 SOLUTION RESPIRATORY (INHALATION) EVERY 4 HOURS PRN
Qty: 50 ML | Refills: 0 | Status: SHIPPED | OUTPATIENT
Start: 2025-07-02

## 2025-07-07 DIAGNOSIS — J43.2 CENTRILOBULAR EMPHYSEMA (HCC): ICD-10-CM

## 2025-07-07 RX ORDER — IPRATROPIUM BROMIDE AND ALBUTEROL SULFATE 2.5; .5 MG/3ML; MG/3ML
SOLUTION RESPIRATORY (INHALATION)
Qty: 90 ML | OUTPATIENT
Start: 2025-07-07

## 2025-07-23 ENCOUNTER — OFFICE VISIT (OUTPATIENT)
Dept: FAMILY MEDICINE CLINIC | Age: 75
End: 2025-07-23

## 2025-07-23 VITALS
WEIGHT: 156 LBS | RESPIRATION RATE: 18 BRPM | HEART RATE: 95 BPM | DIASTOLIC BLOOD PRESSURE: 70 MMHG | OXYGEN SATURATION: 94 % | SYSTOLIC BLOOD PRESSURE: 150 MMHG | BODY MASS INDEX: 31.51 KG/M2

## 2025-07-23 DIAGNOSIS — J43.2 CENTRILOBULAR EMPHYSEMA (HCC): ICD-10-CM

## 2025-07-23 DIAGNOSIS — I10 ESSENTIAL HYPERTENSION: Primary | ICD-10-CM

## 2025-07-23 RX ORDER — NIFEDIPINE 90 MG/1
90 TABLET, EXTENDED RELEASE ORAL DAILY
Qty: 30 TABLET | Refills: 0 | Status: SHIPPED | OUTPATIENT
Start: 2025-07-23

## 2025-07-23 NOTE — PROGRESS NOTES
Jeannette Tripp (:  1950) is a 74 y.o. female, here for evaluation of the following chief complaint(s):  3 Month Follow-Up (Pt is here for a 3 month f/u htn med f/u ), Medication Check, and Hypertension      Assessment & Plan   Jeannette was seen today for 3 month follow-up, medication check and hypertension.    Diagnoses and all orders for this visit:    Essential hypertension  Comments:  INC PROCARDIA TO  90 MG  REPEAT /70    Centrilobular emphysema (HCC)  Comments:  OK TO USE NEBS COUPLE TIMES A DAY IF NEEDED  BARELY WHEEZING  NOT SOB , NO COUGHING    Other orders  -     NIFEdipine (PROCARDIA XL) 90 MG extended release tablet; Take 1 tablet by mouth daily         Orders Placed This Encounter   Medications    NIFEdipine (PROCARDIA XL) 90 MG extended release tablet     Sig: Take 1 tablet by mouth daily     Dispense:  30 tablet     Refill:  0        Return in about 3 weeks (around 2025).       Patient Instructions   BRING IN BP CUFF AND READINGS  FOLLOW UP 3 WEEK    Subjective   SUBJECTIVE/OBJECTIVE:  Hypertension  Pertinent negatives include no headaches.     Bp high today  Taking med   On procardia XL 60    Not cking bp at home  She took about  730 am  No headache or dizziness    Lungs are good  Sees Dr Fine  Only uses inhalers as needed  Not needing it now    Dr Ellison filled the duoneb  Woke up wheezing in the middle of night  Just one night  Not using now.    Hemoglobin A1C (%)   Date Value   2024 5.5   2023 5.3   2022 5.3       Sodium (mmol/L)   Date Value   2025 137   2025 143   2025 138     Potassium (mmol/L)   Date Value   2025 4.0   2025 4.5   2025 3.2 (L)     Potassium reflex Magnesium (mmol/L)   Date Value   2020 4.2   2020 4.1     Chloride (mmol/L)   Date Value   2025 97 (L)   2025 105   2025 98 (L)     CO2 (mmol/L)   Date Value   2025 27   2025 27   2025 29     BUN (mg/dL)   Date Value

## 2025-08-13 ENCOUNTER — OFFICE VISIT (OUTPATIENT)
Dept: FAMILY MEDICINE CLINIC | Age: 75
End: 2025-08-13

## 2025-08-13 VITALS
RESPIRATION RATE: 18 BRPM | WEIGHT: 157 LBS | HEART RATE: 94 BPM | BODY MASS INDEX: 31.71 KG/M2 | DIASTOLIC BLOOD PRESSURE: 62 MMHG | SYSTOLIC BLOOD PRESSURE: 134 MMHG | OXYGEN SATURATION: 94 %

## 2025-08-13 DIAGNOSIS — I10 ESSENTIAL HYPERTENSION: Primary | ICD-10-CM

## (undated) DEVICE — ELECTRODE PT RET AD L9FT HI MOIST COND ADH HYDRGEL CORDED

## (undated) DEVICE — GLOVE SURG SZ 8 L12IN FNGR THK87MIL WHT LTX FREE

## (undated) DEVICE — COVER LT HNDL BLU PLAS

## (undated) DEVICE — CORD ES L10FT MPLR LAP

## (undated) DEVICE — Device

## (undated) DEVICE — SOLUTION SCRB 4OZ 10% POVIDONE IOD ANTIMIC BTL

## (undated) DEVICE — KIT OR ROOM TURNOVER W/STRAP

## (undated) DEVICE — CANISTER, RIGID, 1200CC: Brand: MEDLINE INDUSTRIES, INC.

## (undated) DEVICE — SYRINGE MED 30ML STD CLR PLAS LUERLOCK TIP N CTRL DISP

## (undated) DEVICE — PACK,LAPARASCOPY,PELVISCOPY,AURORA: Brand: MEDLINE

## (undated) DEVICE — TROCAR: Brand: KII SHIELDED BLADED ACCESS SYSTEM

## (undated) DEVICE — GLOVE ORANGE PI 7   MSG9070

## (undated) DEVICE — TROCAR: Brand: KII SLEEVE

## (undated) DEVICE — TUBING, SUCTION, 1/4" X 12', STRAIGHT: Brand: MEDLINE

## (undated) DEVICE — APPLIER CLP M L L11.4IN DIA10MM ENDOSCP ROT MULT FOR LIG

## (undated) DEVICE — GOWN SIRUS NONREIN LG W/TWL: Brand: MEDLINE INDUSTRIES, INC.

## (undated) DEVICE — DRAPE,LAP,CHOLE,W/TROUGHS,STERILE: Brand: MEDLINE

## (undated) DEVICE — SOLUTION PREP POVIDONE IOD FOR SKIN MUCOUS MEM PRIOR TO

## (undated) DEVICE — GLOVE,SURG,TRIUMPH MICRO,LTX,PF,7.5: Brand: MEDLINE

## (undated) DEVICE — PENCIL ES L3M BTTN SWCH S STL HEX LOK BLDE ELECTRD HOLSTER

## (undated) DEVICE — SYRINGE 20ML LL S/C 50

## (undated) DEVICE — PENROSE DRAIN 18 X .5" SILICONE: Brand: MEDLINE

## (undated) DEVICE — Device: Brand: MEDEX

## (undated) DEVICE — DRAPE LAP 104X35X124IN BLU CTRL + FAB REINF ABD FEN

## (undated) DEVICE — [HIGH FLOW INSUFFLATOR,  DO NOT USE IF PACKAGE IS DAMAGED,  KEEP DRY,  KEEP AWAY FROM SUNLIGHT,  PROTECT FROM HEAT AND RADIOACTIVE SOURCES.]: Brand: PNEUMOSURE

## (undated) DEVICE — SUTURE VCRL SZ 4-0 L27IN ABSRB UD L19MM FS-2 3/8 CIR REV J422H

## (undated) DEVICE — SUTURE SZ 0 27IN 5/8 CIR UR-6  TAPER PT VIOLET ABSRB VICRYL J603H

## (undated) DEVICE — APPLIER CLP L9.375IN APER 2.1MM CLS L3.8MM 20 SM TI CLP

## (undated) DEVICE — SOLUTION IRRIG BSS ST 500ML

## (undated) DEVICE — SOLUTION IV IRRIG POUR BRL 0.9% SODIUM CHL 2F7124

## (undated) DEVICE — Y-TYPE TUR/BLADDER IRRIGATION SET, REGULATING CLAMP

## (undated) DEVICE — LAPAROSCOPY PK

## (undated) DEVICE — CHLORAPREP 26ML ORANGE

## (undated) DEVICE — Z DISCONTINUED USE 2270995 CATHETER URETH 16FR L16IN RED RUB INTMIT RND HLLW TIP 2 OPP

## (undated) DEVICE — PAD,NON-ADHERENT,3X8,STERILE,LF,1/PK: Brand: MEDLINE

## (undated) DEVICE — NEEDLE SPNL L3.5IN PNK HUB S STL REG WALL FIT STYL W/ QNCKE

## (undated) DEVICE — CATHETER,URETHRAL,REDRUBBER,STRL,16FR: Brand: MEDLINE

## (undated) DEVICE — PAD SANITARY MTRN TAB BELT WRP NS 11IN

## (undated) DEVICE — NEEDLE HYPO 25GA L1.5IN BVL ORIENTED ECLIPSE

## (undated) DEVICE — SYRINGE MED 3ML CLR PLAS STD N CTRL LUERLOCK TIP DISP

## (undated) DEVICE — INSUFFLATION NEEDLE TO ESTABLISH PNEUMOPERITONEUM.: Brand: INSUFFLATION NEEDLE

## (undated) DEVICE — APPLICATOR ST RAYON TIP

## (undated) DEVICE — DRAPE C ARM UNIV W41XL74IN CLR PLAS XR VELC CLSR POLY STRP

## (undated) DEVICE — SKIN AFFIX SURG ADHESIVE 72/CS 0.55ML: Brand: MEDLINE

## (undated) DEVICE — INDICATED FOR USE DURING OPEN AND LAPAROSCOPIC CHOLECYSTECTOMY PROCEDURES TO INJECT RADIOPAQUE MEDIA THROUGH THE CYSTIC DUCT INTO THE BILIARY TREE.: Brand: AEROSTAT®

## (undated) DEVICE — SOLUTION IRRIG 250ML STRL H2O PLAS POUR BTL USP

## (undated) DEVICE — Z DUP USE 2257490 ADHESIVE SKIN CLSRE 036ML TPCL 2CTL CNCRLTE HIGH VSCSTY DRMB

## (undated) DEVICE — GARMENT COMPR STD FOR 17IN CALF UNIF THER FLOTRN

## (undated) DEVICE — DRAPE MINOR  6IN X 6IN REINFORC FENES ADHES

## (undated) DEVICE — MARKER,SKIN,WI/RULER AND LABELS: Brand: MEDLINE

## (undated) DEVICE — Z DISCONTINUED USE 2273271 SOLUTION PREP 4OZ 10% POVIDONE IOD BTL FLIP TOP CAP

## (undated) DEVICE — ELECTRODE LOOP 12X20MM SHFT L5IN DIA3/32IN LEEP LLETZ

## (undated) DEVICE — SURGICAL PROC PACK SHT WEST V4

## (undated) DEVICE — GLOVE ORANGE PI 7 1/2   MSG9075

## (undated) DEVICE — SUTURE VCRL SZ 0 L27IN ABSRB UD L26MM CT-2 1/2 CIR J270H

## (undated) DEVICE — ELECTRODE BALL DIA5MM TUNGSTEN LLETZ DURABLE RESIST

## (undated) DEVICE — GOWN SIRUS NONREIN XL W/TWL: Brand: MEDLINE INDUSTRIES, INC.

## (undated) DEVICE — TISSUE RETRIEVAL SYSTEM: Brand: INZII RETRIEVAL SYSTEM

## (undated) DEVICE — ELECTROSURGICAL PENCIL BUTTON SWITCH NON COATED BLADE ELECTRODE 10 FT (3 M) CORD HOLSTER: Brand: MEGADYNE

## (undated) DEVICE — MERCY HEALTH WEST TURNOVER: Brand: MEDLINE INDUSTRIES, INC.

## (undated) DEVICE — MINOR SET UP PK

## (undated) DEVICE — SUTURE PERMA-HAND SZ 2-0 L30IN NONABSORBABLE BLK L26MM SH K833H

## (undated) DEVICE — SUTURE VCRL SZ 3-0 L54IN ABSRB UD LIGAPAK REEL POLYGLACTIN J285G

## (undated) DEVICE — PMI DISPOSABLE PUNCTURE CLOSURE DEVICE / SUTURE GRASPER: Brand: PMI

## (undated) DEVICE — SUTURE VCRL SZ 4-0 L18IN ABSRB UD L19MM PS-2 3/8 CIR PRIM J496H

## (undated) DEVICE — Z DISCONTINUED BY MEDLINE USE 2711682 TRAY SKIN PREP DRY W/ PREM GLV

## (undated) DEVICE — SYRINGE TB 1ML NDL 25GA L0.625IN PLAS SLIP TIP CONVENTIONAL

## (undated) DEVICE — SUTURE VCRL SZ 3-0 L27IN ABSRB UD L26MM SH 1/2 CIR J416H

## (undated) DEVICE — SOLUTION IV 1000ML 0.9% SOD CHL PH 5 INJ USP VIAFLX PLAS

## (undated) DEVICE — UNDERPAD INCONT XL MESH PROTCT + DISP FOR MAT USE

## (undated) DEVICE — ADTEC SINGLE USE HOOK SCISSORS, SHAFT ONLY, MONOPOLAR, STRAIGHT, WORKING LENGTH: 12 1/4", (310 MM), DIAM. 5 MM, BLUNT/BLUNT, INSULATED, SINGLE ACTION, STERILE, DISPOSABLE, PACKAGE OF 10 PIECES: Brand: AESCULAP

## (undated) DEVICE — TOWEL,OR,DSP,ST,BLUE,STD,4/PK,20PK/CS: Brand: MEDLINE